# Patient Record
Sex: MALE | Race: WHITE | NOT HISPANIC OR LATINO | Employment: OTHER | URBAN - METROPOLITAN AREA
[De-identification: names, ages, dates, MRNs, and addresses within clinical notes are randomized per-mention and may not be internally consistent; named-entity substitution may affect disease eponyms.]

---

## 2019-08-13 ENCOUNTER — HOSPITAL ENCOUNTER (OUTPATIENT)
Dept: PREADMISSION TESTING | Facility: OTHER | Age: 64
Discharge: HOME | End: 2019-08-13
Attending: ORTHOPAEDIC SURGERY
Payer: MEDICARE

## 2019-08-13 ENCOUNTER — ANESTHESIA EVENT (OUTPATIENT)
Dept: SURGERY | Facility: OTHER | Age: 64
End: 2019-08-13
Payer: MEDICARE

## 2019-08-13 VITALS
HEIGHT: 69 IN | HEART RATE: 63 BPM | BODY MASS INDEX: 37.62 KG/M2 | DIASTOLIC BLOOD PRESSURE: 69 MMHG | WEIGHT: 254 LBS | OXYGEN SATURATION: 97 % | SYSTOLIC BLOOD PRESSURE: 137 MMHG | TEMPERATURE: 98 F

## 2019-08-13 DIAGNOSIS — Z01.818 PRE-OP TESTING: Primary | ICD-10-CM

## 2019-08-13 LAB
BASOPHILS # BLD AUTO: 0.06 K/UL (ref 0–0.2)
BASOPHILS NFR BLD: 0.6 % (ref 0–1.9)
DIFFERENTIAL METHOD: ABNORMAL
EOSINOPHIL # BLD AUTO: 0.5 K/UL (ref 0–0.5)
EOSINOPHIL NFR BLD: 4.8 % (ref 0–8)
ERYTHROCYTE [DISTWIDTH] IN BLOOD BY AUTOMATED COUNT: 14.2 % (ref 11.5–14.5)
HCT VFR BLD AUTO: 44.4 % (ref 40–54)
HGB BLD-MCNC: 13.7 G/DL (ref 14–18)
IMM GRANULOCYTES # BLD AUTO: 0.05 K/UL (ref 0–0.04)
IMM GRANULOCYTES NFR BLD AUTO: 0.5 % (ref 0–0.5)
LYMPHOCYTES # BLD AUTO: 2.3 K/UL (ref 1–4.8)
LYMPHOCYTES NFR BLD: 24.2 % (ref 18–48)
MCH RBC QN AUTO: 25.7 PG (ref 27–31)
MCHC RBC AUTO-ENTMCNC: 30.9 G/DL (ref 32–36)
MCV RBC AUTO: 83 FL (ref 82–98)
MONOCYTES # BLD AUTO: 1.3 K/UL (ref 0.3–1)
MONOCYTES NFR BLD: 13.1 % (ref 4–15)
NEUTROPHILS # BLD AUTO: 5.5 K/UL (ref 1.8–7.7)
NEUTROPHILS NFR BLD: 56.8 % (ref 38–73)
NRBC BLD-RTO: 0 /100 WBC
PLATELET # BLD AUTO: 260 K/UL (ref 150–350)
PMV BLD AUTO: 9.2 FL (ref 9.2–12.9)
RBC # BLD AUTO: 5.33 M/UL (ref 4.6–6.2)
WBC # BLD AUTO: 9.62 K/UL (ref 3.9–12.7)

## 2019-08-13 PROCEDURE — 36415 COLL VENOUS BLD VENIPUNCTURE: CPT

## 2019-08-13 PROCEDURE — 85025 COMPLETE CBC W/AUTO DIFF WBC: CPT

## 2019-08-13 RX ORDER — METOPROLOL SUCCINATE 50 MG/1
50 TABLET, EXTENDED RELEASE ORAL DAILY
COMMUNITY

## 2019-08-13 RX ORDER — FAMOTIDINE 20 MG/1
20 TABLET, FILM COATED ORAL
Status: CANCELLED | OUTPATIENT
Start: 2019-08-13 | End: 2019-08-13

## 2019-08-13 RX ORDER — OLMESARTAN MEDOXOMIL AND HYDROCHLOROTHIAZIDE 20/12.5 20; 12.5 MG/1; MG/1
1 TABLET ORAL DAILY
COMMUNITY

## 2019-08-13 RX ORDER — METFORMIN HYDROCHLORIDE 500 MG/1
500 TABLET ORAL 2 TIMES DAILY WITH MEALS
COMMUNITY
End: 2020-08-06

## 2019-08-13 RX ORDER — ACETAMINOPHEN 500 MG
1000 TABLET ORAL
Status: CANCELLED | OUTPATIENT
Start: 2019-08-13 | End: 2019-08-13

## 2019-08-13 RX ORDER — UBIDECARENONE 30 MG
30 CAPSULE ORAL DAILY
COMMUNITY
End: 2020-08-06

## 2019-08-13 RX ORDER — SODIUM CHLORIDE, SODIUM LACTATE, POTASSIUM CHLORIDE, CALCIUM CHLORIDE 600; 310; 30; 20 MG/100ML; MG/100ML; MG/100ML; MG/100ML
INJECTION, SOLUTION INTRAVENOUS CONTINUOUS
Status: CANCELLED | OUTPATIENT
Start: 2019-08-13

## 2019-08-13 RX ORDER — LEVOTHYROXINE SODIUM 200 UG/1
200 TABLET ORAL DAILY
Status: ON HOLD | COMMUNITY
End: 2020-06-08 | Stop reason: SDUPTHER

## 2019-08-13 RX ORDER — PREGABALIN 75 MG/1
75 CAPSULE ORAL
Status: CANCELLED | OUTPATIENT
Start: 2019-08-13 | End: 2019-08-13

## 2019-08-13 RX ORDER — AMLODIPINE BESYLATE 10 MG/1
10 TABLET ORAL DAILY
COMMUNITY

## 2019-08-13 NOTE — DISCHARGE INSTRUCTIONS
PRE-ADMIT TESTING -  [phone]    [address]  [facility]          Your surgery has been scheduled at [facility]. We are pleased to have the opportunity to serve you. For Further Information please call [phone].    On the day of surgery please report to the Information Desk on the 1st floor.    · CONTACT YOUR PHYSICIAN'S OFFICE THE DAY PRIOR TO YOUR SURGERY TO OBTAIN YOUR ARRIVAL TIME.     · The evening before surgery do not eat anything after 9 p.m. ( this includes hard candy, chewing gum and mints).  You may only have GATORADE, POWERADE AND WATER  from 9 p.m. until you leave your home.   DO NOT DRINK ANY LIQUIDS ON THE WAY TO THE HOSPITAL.      SPECIAL MEDICATION INSTRUCTIONS: TAKE medications checked off by the Anesthesiologist on your Medication List.    Angiogram Patients: Take medications as instructed by your physician, including aspirin.     Surgery Patients:    If you take ASPIRIN - Your PHYSICIAN/SURGEON will need to inform you IF/OR when you need to stop taking aspirin prior to your surgery.     Do Not take any medications containing IBUPROFEN.  Do Not Wear any make-up or dark nail polish   (especially eye make-up) to surgery. If you come to surgery with makeup on you will be required to remove the makeup or nail polish.    Do not shave your surgical area at least 5 days prior to your surgery. The surgical prep will be performed at the hospital according to Infection Control regulations.    Leave all valuables at home.   Do Not wear any jewelry or watches, including any metal in body piercings. Jewelry must be removed prior to coming to the hospital.  There is a possibility that rings that are unable to be removed may be cut off if they are on the surgical extremity.    Contact Lens must be removed before surgery. Either do not wear the contact lens or bring a case and solution for storage.  Please bring a container for eyeglasses or dentures as required.  Bring  any paperwork your physician has provided, such as consent forms,  history and physicals, doctor's orders, etc.   Bring comfortable clothes that are loose fitting to wear upon discharge. Take into consideration the type of surgery being performed.  Maintain your diet as advised per your physician the day prior to surgery.      Adequate rest the night before surgery is advised.   Park in the Parking lot behind the hospital or in the [facility] across the street from the parking lot. Parking is complimentary.  If you will be discharged the same day as your procedure, please arrange for a responsible adult to drive you home or to accompany you if traveling by taxi.   YOU WILL NOT BE PERMITTED TO DRIVE OR TO LEAVE THE HOSPITAL ALONE AFTER SURGERY.   It is strongly recommended that you arrange for someone to remain with you for the first 24 hrs following your surgery.    The Surgeon will speak to your family/visitor after your surgery regarding the outcome of your surgery and post op care.  The Surgeon may speak to you after your surgery, but there is a possibility you may not remember the details.  Please check with your family members regarding the conversation with the Surgeon.    We strongly recommend whoever is bringing you home be present for discharge instructions.  This will ensure a thorough understanding for your post op home care.      Thank you for your cooperation.  The Staff of [facility].                Bathing Instructions with Hibiclens     Shower the evening before and morning of your procedure with Hibiclens:   Wash your face with water and your regular face wash/soap   Apply Hibiclens directly on your skin or on a wet washcloth and wash gently. When showering: Move away from the shower stream when applying Hibiclens to avoid rinsing off too soon.   Rinse thoroughly with warm water   Do not dilute Hibiclens         Dry off as usual, do not use any deodorant,  powder, body lotions, perfume, after shave or cologne.

## 2019-08-13 NOTE — ANESTHESIA PREPROCEDURE EVALUATION
08/13/2019  Lorena Rodriguez is a 64 y.o., male.    Anesthesia Evaluation    I have reviewed the Patient Summary Reports.    I have reviewed the Nursing Notes.   I have reviewed the Medications.     Review of Systems  Anesthesia Hx:  No problems with previous Anesthesia  Denies Family Hx of Anesthesia complications.   Denies Personal Hx of Anesthesia complications.   Social:  Non-Smoker    Hematology/Oncology:  Hematology Normal   Oncology Normal     Cardiovascular:   Hypertension CAD  CABG/stent  Coronary stent [place] 2012.  Saw cardiologist preoperatively   Pulmonary:  Pulmonary Normal    Renal/:  Renal/ Normal     Hepatic/GI:   PUD, GERD    Musculoskeletal:  Musculoskeletal Normal    Neurological:  Neurology Normal    Endocrine:   Diabetes, well controlled, type 2 Hypothyroidism        Physical Exam  General:  Well nourished    Airway/Jaw/Neck:  Airway Findings: Mouth Opening: Normal Tongue: Normal  General Airway Assessment: Adult  Mallampati: I  TM Distance: Normal, at least 6 cm         Dental:  Dental Findings: Upper Dentures, Lower Dentures             Anesthesia Plan  Type of Anesthesia, risks & benefits discussed:  Anesthesia Type:  general  Patient's Preference:   Intra-op Monitoring Plan: standard ASA monitors  Intra-op Monitoring Plan Comments:   Post Op Pain Control Plan: peripheral nerve block and multimodal analgesia  Post Op Pain Control Plan Comments:   Induction:   IV  Beta Blocker:         Informed Consent: Patient understands risks and agrees with Anesthesia plan.  Questions answered. Anesthesia consent signed with patient.  ASA Score: 3     Day of Surgery Review of History & Physical:    H&P update referred to the surgeon.     Anesthesia Plan Notes: Cardiac studies in care everywhere        Ready For Surgery From Anesthesia Perspective.

## 2019-08-20 ENCOUNTER — ANESTHESIA (OUTPATIENT)
Dept: SURGERY | Facility: OTHER | Age: 64
End: 2019-08-20
Payer: MEDICARE

## 2019-08-20 ENCOUNTER — HOSPITAL ENCOUNTER (OUTPATIENT)
Facility: OTHER | Age: 64
Discharge: HOME | End: 2019-08-20
Attending: ORTHOPAEDIC SURGERY | Admitting: ORTHOPAEDIC SURGERY
Payer: MEDICARE

## 2019-08-20 VITALS
HEIGHT: 69 IN | WEIGHT: 254 LBS | BODY MASS INDEX: 37.62 KG/M2 | RESPIRATION RATE: 16 BRPM | TEMPERATURE: 99 F | HEART RATE: 66 BPM | OXYGEN SATURATION: 97 % | SYSTOLIC BLOOD PRESSURE: 113 MMHG | DIASTOLIC BLOOD PRESSURE: 86 MMHG

## 2019-08-20 DIAGNOSIS — M75.121 COMPLETE ROTATOR CUFF TEAR OR RUPTURE OF RIGHT SHOULDER, NOT SPECIFIED AS TRAUMATIC: Primary | ICD-10-CM

## 2019-08-20 PROBLEM — M75.41 IMPINGEMENT SYNDROME OF SHOULDER, RIGHT: Status: ACTIVE | Noted: 2019-08-20

## 2019-08-20 PROCEDURE — 71000015 HC POSTOP RECOV 1ST HR: Performed by: ORTHOPAEDIC SURGERY

## 2019-08-20 PROCEDURE — 63600175 PHARM REV CODE 636 W HCPCS: Performed by: ANESTHESIOLOGY

## 2019-08-20 PROCEDURE — 37000009 HC ANESTHESIA EA ADD 15 MINS: Performed by: ORTHOPAEDIC SURGERY

## 2019-08-20 PROCEDURE — 25000003 PHARM REV CODE 250: Performed by: NURSE ANESTHETIST, CERTIFIED REGISTERED

## 2019-08-20 PROCEDURE — S0077 INJECTION, CLINDAMYCIN PHOSP: HCPCS | Performed by: ORTHOPAEDIC SURGERY

## 2019-08-20 PROCEDURE — C1713 ANCHOR/SCREW BN/BN,TIS/BN: HCPCS | Performed by: ORTHOPAEDIC SURGERY

## 2019-08-20 PROCEDURE — 63600175 PHARM REV CODE 636 W HCPCS: Performed by: ORTHOPAEDIC SURGERY

## 2019-08-20 PROCEDURE — 82962 GLUCOSE BLOOD TEST: CPT | Performed by: ORTHOPAEDIC SURGERY

## 2019-08-20 PROCEDURE — 71000033 HC RECOVERY, INTIAL HOUR: Performed by: ORTHOPAEDIC SURGERY

## 2019-08-20 PROCEDURE — C1889 IMPLANT/INSERT DEVICE, NOC: HCPCS | Performed by: ORTHOPAEDIC SURGERY

## 2019-08-20 PROCEDURE — 36000711: Performed by: ORTHOPAEDIC SURGERY

## 2019-08-20 PROCEDURE — 36000710: Performed by: ORTHOPAEDIC SURGERY

## 2019-08-20 PROCEDURE — 63600175 PHARM REV CODE 636 W HCPCS: Performed by: NURSE ANESTHETIST, CERTIFIED REGISTERED

## 2019-08-20 PROCEDURE — 27201423 OPTIME MED/SURG SUP & DEVICES STERILE SUPPLY: Performed by: ORTHOPAEDIC SURGERY

## 2019-08-20 PROCEDURE — 71000016 HC POSTOP RECOV ADDL HR: Performed by: ORTHOPAEDIC SURGERY

## 2019-08-20 PROCEDURE — 71000039 HC RECOVERY, EACH ADD'L HOUR: Performed by: ORTHOPAEDIC SURGERY

## 2019-08-20 PROCEDURE — 25000003 PHARM REV CODE 250: Performed by: ORTHOPAEDIC SURGERY

## 2019-08-20 PROCEDURE — 25000003 PHARM REV CODE 250: Performed by: ANESTHESIOLOGY

## 2019-08-20 PROCEDURE — 37000008 HC ANESTHESIA 1ST 15 MINUTES: Performed by: ORTHOPAEDIC SURGERY

## 2019-08-20 DEVICE — ANCHOR TENDON 8: Type: IMPLANTABLE DEVICE | Site: SHOULDER | Status: FUNCTIONAL

## 2019-08-20 DEVICE — ANCHOR BONE ARTHSCP DEL SYS: Type: IMPLANTABLE DEVICE | Site: SHOULDER | Status: FUNCTIONAL

## 2019-08-20 DEVICE — ANCHOR SWIVELOCK C BLU FIBERTP: Type: IMPLANTABLE DEVICE | Site: SHOULDER | Status: FUNCTIONAL

## 2019-08-20 DEVICE — ANCHOR BIOCOMP SWVLLOK: Type: IMPLANTABLE DEVICE | Site: SHOULDER | Status: FUNCTIONAL

## 2019-08-20 DEVICE — ANCHOR BIOCOMP W/3 SUTURES: Type: IMPLANTABLE DEVICE | Site: SHOULDER | Status: FUNCTIONAL

## 2019-08-20 DEVICE — IMPLANT ARTHSCP BIOINDUCTV LG: Type: IMPLANTABLE DEVICE | Site: SHOULDER | Status: FUNCTIONAL

## 2019-08-20 RX ORDER — ACETAMINOPHEN 500 MG
1000 TABLET ORAL
Status: COMPLETED | OUTPATIENT
Start: 2019-08-20 | End: 2019-08-20

## 2019-08-20 RX ORDER — FAMOTIDINE 20 MG/1
20 TABLET, FILM COATED ORAL
Status: COMPLETED | OUTPATIENT
Start: 2019-08-20 | End: 2019-08-20

## 2019-08-20 RX ORDER — CLINDAMYCIN PHOSPHATE 900 MG/50ML
900 INJECTION, SOLUTION INTRAVENOUS
Status: COMPLETED | OUTPATIENT
Start: 2019-08-20 | End: 2019-08-20

## 2019-08-20 RX ORDER — DEXAMETHASONE SODIUM PHOSPHATE 4 MG/ML
INJECTION, SOLUTION INTRA-ARTICULAR; INTRALESIONAL; INTRAMUSCULAR; INTRAVENOUS; SOFT TISSUE
Status: DISCONTINUED | OUTPATIENT
Start: 2019-08-20 | End: 2019-08-20

## 2019-08-20 RX ORDER — EPINEPHRINE 1 MG/ML
INJECTION, SOLUTION INTRACARDIAC; INTRAMUSCULAR; INTRAVENOUS; SUBCUTANEOUS
Status: DISCONTINUED | OUTPATIENT
Start: 2019-08-20 | End: 2019-08-20 | Stop reason: HOSPADM

## 2019-08-20 RX ORDER — PREGABALIN 75 MG/1
75 CAPSULE ORAL
Status: COMPLETED | OUTPATIENT
Start: 2019-08-20 | End: 2019-08-20

## 2019-08-20 RX ORDER — OXYCODONE HYDROCHLORIDE 5 MG/1
10 TABLET ORAL EVERY 4 HOURS PRN
Status: DISCONTINUED | OUTPATIENT
Start: 2019-08-20 | End: 2019-08-20 | Stop reason: HOSPADM

## 2019-08-20 RX ORDER — PROPOFOL 10 MG/ML
VIAL (ML) INTRAVENOUS
Status: DISCONTINUED | OUTPATIENT
Start: 2019-08-20 | End: 2019-08-20

## 2019-08-20 RX ORDER — ONDANSETRON 2 MG/ML
INJECTION INTRAMUSCULAR; INTRAVENOUS
Status: DISCONTINUED | OUTPATIENT
Start: 2019-08-20 | End: 2019-08-20

## 2019-08-20 RX ORDER — ROPIVACAINE HYDROCHLORIDE 5 MG/ML
INJECTION, SOLUTION EPIDURAL; INFILTRATION; PERINEURAL
Status: COMPLETED | OUTPATIENT
Start: 2019-08-20 | End: 2019-08-20

## 2019-08-20 RX ORDER — SODIUM CHLORIDE 0.9 % (FLUSH) 0.9 %
3 SYRINGE (ML) INJECTION
Status: DISCONTINUED | OUTPATIENT
Start: 2019-08-20 | End: 2019-08-20 | Stop reason: HOSPADM

## 2019-08-20 RX ORDER — OXYCODONE AND ACETAMINOPHEN 10; 325 MG/1; MG/1
1 TABLET ORAL EVERY 4 HOURS PRN
Qty: 35 TABLET | Refills: 0 | Status: SHIPPED | OUTPATIENT
Start: 2019-08-20 | End: 2020-08-03 | Stop reason: ALTCHOICE

## 2019-08-20 RX ORDER — MIDAZOLAM HYDROCHLORIDE 1 MG/ML
2 INJECTION INTRAMUSCULAR; INTRAVENOUS
Status: DISCONTINUED | OUTPATIENT
Start: 2019-08-20 | End: 2019-08-20

## 2019-08-20 RX ORDER — FENTANYL CITRATE 50 UG/ML
INJECTION, SOLUTION INTRAMUSCULAR; INTRAVENOUS
Status: DISCONTINUED | OUTPATIENT
Start: 2019-08-20 | End: 2019-08-20

## 2019-08-20 RX ORDER — GLYCOPYRROLATE 0.2 MG/ML
INJECTION INTRAMUSCULAR; INTRAVENOUS
Status: DISCONTINUED | OUTPATIENT
Start: 2019-08-20 | End: 2019-08-20

## 2019-08-20 RX ORDER — ONDANSETRON 4 MG/1
8 TABLET, ORALLY DISINTEGRATING ORAL EVERY 8 HOURS PRN
Qty: 10 TABLET | Refills: 1 | Status: SHIPPED | OUTPATIENT
Start: 2019-08-20 | End: 2020-08-03 | Stop reason: ALTCHOICE

## 2019-08-20 RX ORDER — ONDANSETRON 2 MG/ML
4 INJECTION INTRAMUSCULAR; INTRAVENOUS EVERY 12 HOURS PRN
Status: DISCONTINUED | OUTPATIENT
Start: 2019-08-20 | End: 2019-08-20 | Stop reason: HOSPADM

## 2019-08-20 RX ORDER — LIDOCAINE HCL/PF 100 MG/5ML
SYRINGE (ML) INTRAVENOUS
Status: DISCONTINUED | OUTPATIENT
Start: 2019-08-20 | End: 2019-08-20

## 2019-08-20 RX ORDER — SODIUM CHLORIDE 0.9 % (FLUSH) 0.9 %
10 SYRINGE (ML) INJECTION
Status: DISCONTINUED | OUTPATIENT
Start: 2019-08-20 | End: 2019-08-20 | Stop reason: HOSPADM

## 2019-08-20 RX ORDER — HYDROCODONE BITARTRATE AND ACETAMINOPHEN 5; 325 MG/1; MG/1
1 TABLET ORAL EVERY 4 HOURS PRN
Status: DISCONTINUED | OUTPATIENT
Start: 2019-08-20 | End: 2019-08-20 | Stop reason: HOSPADM

## 2019-08-20 RX ORDER — MEPERIDINE HYDROCHLORIDE 25 MG/ML
12.5 INJECTION INTRAMUSCULAR; INTRAVENOUS; SUBCUTANEOUS ONCE AS NEEDED
Status: DISCONTINUED | OUTPATIENT
Start: 2019-08-20 | End: 2019-08-20 | Stop reason: HOSPADM

## 2019-08-20 RX ORDER — NEOSTIGMINE METHYLSULFATE 1 MG/ML
INJECTION, SOLUTION INTRAVENOUS
Status: DISCONTINUED | OUTPATIENT
Start: 2019-08-20 | End: 2019-08-20

## 2019-08-20 RX ORDER — HYDROMORPHONE HYDROCHLORIDE 2 MG/ML
0.4 INJECTION, SOLUTION INTRAMUSCULAR; INTRAVENOUS; SUBCUTANEOUS EVERY 5 MIN PRN
Status: DISCONTINUED | OUTPATIENT
Start: 2019-08-20 | End: 2019-08-20 | Stop reason: HOSPADM

## 2019-08-20 RX ORDER — TRAMADOL HYDROCHLORIDE 50 MG/1
50 TABLET ORAL EVERY 4 HOURS PRN
Status: DISCONTINUED | OUTPATIENT
Start: 2019-08-20 | End: 2019-08-20 | Stop reason: HOSPADM

## 2019-08-20 RX ORDER — OXYCODONE HYDROCHLORIDE 5 MG/1
5 TABLET ORAL
Status: DISCONTINUED | OUTPATIENT
Start: 2019-08-20 | End: 2019-08-20 | Stop reason: HOSPADM

## 2019-08-20 RX ORDER — ONDANSETRON 2 MG/ML
4 INJECTION INTRAMUSCULAR; INTRAVENOUS DAILY PRN
Status: DISCONTINUED | OUTPATIENT
Start: 2019-08-20 | End: 2019-08-20 | Stop reason: HOSPADM

## 2019-08-20 RX ORDER — MUPIROCIN 20 MG/G
1 OINTMENT TOPICAL 2 TIMES DAILY
Status: DISCONTINUED | OUTPATIENT
Start: 2019-08-20 | End: 2019-08-20 | Stop reason: HOSPADM

## 2019-08-20 RX ORDER — SODIUM CHLORIDE, SODIUM LACTATE, POTASSIUM CHLORIDE, CALCIUM CHLORIDE 600; 310; 30; 20 MG/100ML; MG/100ML; MG/100ML; MG/100ML
INJECTION, SOLUTION INTRAVENOUS CONTINUOUS
Status: DISCONTINUED | OUTPATIENT
Start: 2019-08-20 | End: 2019-08-20 | Stop reason: HOSPADM

## 2019-08-20 RX ORDER — FENTANYL CITRATE 50 UG/ML
100 INJECTION, SOLUTION INTRAMUSCULAR; INTRAVENOUS EVERY 5 MIN PRN
Status: DISCONTINUED | OUTPATIENT
Start: 2019-08-20 | End: 2019-08-20

## 2019-08-20 RX ORDER — MIDAZOLAM HYDROCHLORIDE 1 MG/ML
2 INJECTION INTRAMUSCULAR; INTRAVENOUS
Status: DISCONTINUED | OUTPATIENT
Start: 2019-08-20 | End: 2019-08-20 | Stop reason: HOSPADM

## 2019-08-20 RX ORDER — FENTANYL CITRATE 50 UG/ML
100 INJECTION, SOLUTION INTRAMUSCULAR; INTRAVENOUS EVERY 5 MIN PRN
Status: DISCONTINUED | OUTPATIENT
Start: 2019-08-20 | End: 2019-08-20 | Stop reason: HOSPADM

## 2019-08-20 RX ORDER — ROCURONIUM BROMIDE 10 MG/ML
INJECTION, SOLUTION INTRAVENOUS
Status: DISCONTINUED | OUTPATIENT
Start: 2019-08-20 | End: 2019-08-20

## 2019-08-20 RX ADMIN — ROPIVACAINE HYDROCHLORIDE 30 ML: 5 INJECTION, SOLUTION EPIDURAL; INFILTRATION; PERINEURAL at 10:08

## 2019-08-20 RX ADMIN — ACETAMINOPHEN 1000 MG: 500 TABLET, FILM COATED ORAL at 08:08

## 2019-08-20 RX ADMIN — PREGABALIN 75 MG: 75 CAPSULE ORAL at 08:08

## 2019-08-20 RX ADMIN — PROPOFOL 200 MG: 10 INJECTION, EMULSION INTRAVENOUS at 10:08

## 2019-08-20 RX ADMIN — CARBOXYMETHYLCELLULOSE SODIUM 3 DROP: 2.5 SOLUTION/ DROPS OPHTHALMIC at 10:08

## 2019-08-20 RX ADMIN — SODIUM CHLORIDE, SODIUM LACTATE, POTASSIUM CHLORIDE, AND CALCIUM CHLORIDE: 600; 310; 30; 20 INJECTION, SOLUTION INTRAVENOUS at 09:08

## 2019-08-20 RX ADMIN — CLINDAMYCIN PHOSPHATE 900 MG: 18 INJECTION, SOLUTION INTRAVENOUS at 10:08

## 2019-08-20 RX ADMIN — FAMOTIDINE 20 MG: 20 TABLET, FILM COATED ORAL at 08:08

## 2019-08-20 RX ADMIN — DEXAMETHASONE SODIUM PHOSPHATE 4 MG: 4 INJECTION, SOLUTION INTRAMUSCULAR; INTRAVENOUS at 10:08

## 2019-08-20 RX ADMIN — GLYCOPYRROLATE 0.7 MG: 0.2 INJECTION, SOLUTION INTRAMUSCULAR; INTRAVENOUS at 11:08

## 2019-08-20 RX ADMIN — ONDANSETRON 4 MG: 2 INJECTION INTRAMUSCULAR; INTRAVENOUS at 11:08

## 2019-08-20 RX ADMIN — ROCURONIUM BROMIDE 50 MG: 10 INJECTION, SOLUTION INTRAVENOUS at 10:08

## 2019-08-20 RX ADMIN — NEOSTIGMINE METHYLSULFATE 5 MG: 1 INJECTION INTRAVENOUS at 11:08

## 2019-08-20 RX ADMIN — LIDOCAINE HYDROCHLORIDE 100 MG: 20 INJECTION, SOLUTION INTRAVENOUS at 10:08

## 2019-08-20 NOTE — ANESTHESIA PROCEDURE NOTES
Right interscalene block    Patient location during procedure: holding area   Block not for primary anesthetic.  Reason for block: at surgeon's request and post-op pain management   Post-op Pain Location: right shoulder pain  Start time: 8/20/2019 9:53 AM  Timeout: 8/20/2019 9:52 AM   End time: 8/20/2019 10:03 AM    Staffing  Authorizing Provider: Clay Parker MD  Performing Provider: Clay Parker MD    Preanesthetic Checklist  Completed: patient identified, site marked, surgical consent, pre-op evaluation, timeout performed, IV checked, risks and benefits discussed and monitors and equipment checked  Peripheral Block  Patient position: supine  Prep: ChloraPrep and site prepped and draped  Patient monitoring: heart rate, cardiac monitor, continuous pulse ox and frequent blood pressure checks  Block type: interscalene  Laterality: right  Injection technique: single shot  Needle  Needle type: Echogenic   Needle gauge: 22 G  Needle length: 4 in  Needle localization: anatomical landmarks, nerve stimulator and ultrasound guidance   -ultrasound image captured on disc.  Assessment  Injection assessment: negative aspiration, negative parasthesia and local visualized surrounding nerve  Paresthesia pain: none  Heart rate change: no  Slow fractionated injection: yes

## 2019-08-20 NOTE — TRANSFER OF CARE
"Anesthesia Transfer of Care Note    Patient: Lorena Rodriguez    Procedure(s) Performed: Procedure(s) (LRB):  ARTHROSCOPY, SHOULDER, WITH SUBACROMIAL SPACE DECOMPRESSION (Right)  REPAIR, ROTATOR CUFF, ARTHROSCOPIC (Right)  CHONDROPLASTY, SHOULDER (Right)  CAPSULORRHAPHY (Right)  ARTHROSCOPY, SHOULDER (Right)    Patient location: PACU    Anesthesia Type: general    Transport from OR: Transported from OR on 2-3 L/min O2 by NC with adequate spontaneous ventilation    Post pain: adequate analgesia    Post assessment: no apparent anesthetic complications    Post vital signs: stable    Level of consciousness: awake and alert    Nausea/Vomiting: no nausea/vomiting    Complications: none    Transfer of care protocol was followed      Last vitals:   Visit Vitals  BP (!) 140/70 (BP Location: Right arm, Patient Position: Sitting)   Pulse 65   Temp 36.9 °C (98.4 °F) (Oral)   Resp 16   Ht 5' 8.5" (1.74 m)   Wt 115.2 kg (254 lb)   SpO2 95%   BMI 38.06 kg/m²     "

## 2019-08-20 NOTE — ANESTHESIA POSTPROCEDURE EVALUATION
Anesthesia Post Evaluation    Patient: Lorena Rodriguez    Procedure(s) Performed: Procedure(s) (LRB):  ARTHROSCOPY, SHOULDER, WITH SUBACROMIAL SPACE DECOMPRESSION (Right)  REPAIR, ROTATOR CUFF, ARTHROSCOPIC (Right)  CHONDROPLASTY, SHOULDER (Right)  CAPSULORRHAPHY (Right)    Final Anesthesia Type: general  Patient location during evaluation: PACU  Patient participation: Yes- Able to Participate  Level of consciousness: oriented and awake  Post-procedure vital signs: reviewed and stable  Pain management: adequate  Airway patency: patent  PONV status at discharge: No PONV  Anesthetic complications: no      Cardiovascular status: hemodynamically stable  Respiratory status: unassisted, spontaneous ventilation and room air  Hydration status: euvolemic  Follow-up not needed.          Vitals Value Taken Time   /58 8/20/2019  1:13 PM   Temp 36.9 °C (98.5 °F) 8/20/2019  1:13 PM   Pulse 66 8/20/2019  1:13 PM   Resp 16 8/20/2019  1:13 PM   SpO2 94 % 8/20/2019  1:13 PM         Event Time     Out of Recovery 13:10:53          Pain/Duke Score: Pain Rating Prior to Med Admin: 0 (8/20/2019  8:29 AM)  Duke Score: 9 (8/20/2019  1:13 PM)

## 2019-08-20 NOTE — DISCHARGE INSTRUCTIONS
Select on Hyperlink below to print updated instructions from [email or web address]  [email or web address]      Select on Hyperlink below to print updated instructions from [email or web address]  Shoulder Sling Shot 2 Breg Brace              FOLLOW ANY OTHER INSTRUCTIONS GIVEN TO YOU BY DR ALFARO

## 2019-08-20 NOTE — OP NOTE
[facility]  Surgery Department  Operative Note    SUMMARY     Date of Procedure: 8/20/2019     Procedure:   1.  Right shoulder arthroscopic rotator cuff repair  2.  Right shoulder allograft patch augmentation (Erwin and Nephew Regeneten)  3.  Right shoulder arthroscopic subacromial decompression (bursectomy, CA ligament resession, bursectomy)  4.  Right shoulder arthroscopic biceps tenotomy    Surgeon(s) and Role:     * Nj You MD - Primary    Assistant: Sheron Franklin CST    Pre-Operative Diagnosis:   1.  Right shoulder rotator cuff tear  2.  Right shoulder impingement syndrome  3.  Right shoulder long head biceps tear  4.  Right shoulder glenohumeral joint chondromalacia    Post-Operative Diagnosis:   Same    Anesthesia: General + regional    Technical Procedures Used: Mr. Rodriguez was taken to the operating room on 08/20/2019 for planned right shoulder arthroscopy.  He was given 900 mg of clindamycin as well as an interscalene block by the anesthesia service.  He is brought to the operating room and placed in the supine position.  General tracheal anesthesia was administered.  Examination under anesthesia revealed full passive motion with no pathologic laxity.  His right shoulder was then placed in a shoulder suspension device while he was in the lateral decubitus position with all bony prominences padded appropriately with an axillary roll and beanbag.  The right shoulder was then prepped and draped in the usual sterile fashion. Surgical marking pen was then used to marcin the bony landmarks and 30 cc of normal saline were injected into the glenohumeral joint to aid in capsular distention.  A standard posterior portal was then established.    Diagnostic arthroscopy then proceeded.  There was evidence of a Gautam complex with some mild undersurface fraying of the subscapularis but no high-grade tearing.  There was diffuse grade 1/2 chondromalacia of the humeral head and glenoid.  There was  circumferential labral fraying but no unstable labral tearing.  There is an obvious full-thickness supraspinatus and infraspinatus tear visualized from the articular side.  There was a very high-grade long head biceps tear with a few strands remaining.  Through an anterior portal established with spinal needle localization the biceps tendon was tenotomized and seen to incarcerated self within the groove.  The electric cart of ice was then used to trim back some of the degenerative labral fraying.  The scope was then placed in the subacromial space. A lateral portal was established with spinal needle localization.  A thorough bursectomy was performed of some thickened chronic appearing bursal tissue.  A type 3 acromion was encountered with a hypertrophied CA ligament. The CA ligament was then recessed off the acromial edge and an acromioplasty was performed from a type 3 to a type 1 morphology.  The shaver was then brought in to remove any soft tissue off the articular margin/insertion of the rotator cuff.  An accessory lateral portal was then established followed by a 5.5 mm triple loaded corkscrew anchor.  A side-to-side suture was then used to close the reverse L type tear to more of a crescent type tear.  All 6 sutures were then passed in a horizontal mattress fashion starting anterior and working posterior.  Sliding knots were then tied reapproximating the tendon nicely back to its insertion site. A lateral row construct was utilized with 24.75 mm SwiveLock anchors.  The final repair construct was probed and found to be rock solid stable. Given the size of the tear a large Smith and Nephew Regeneten patch was used covering the thinned portion of the tendon. The shaver was then brought in to remove any soft tissue or bony debris. The portals were then closed with 3 0 Prolene suture a soft dressing was applied followed by a polar Care unit and an abduction brace.  The patient was then extubated in the operating  room and taken to the PACU without complication.    Description of the Findings of the Procedure: see above    Significant Surgical Tasks Conducted by the Assistant(s), if Applicable:  Retraction, instrumentation, draping    Complications: No    Estimated Blood Loss (EBL): 3cc         Implants:   Implant Name Type Inv. Item Serial No.  Lot No. LRB No. Used   ANCHOR BIOCOMP W/3 SUTURES - [id number]  ANCHOR BIOCOMP W/3 SUTURES  [employer] [id number] Right 1   ANCHOR SWIVELOCK C CATALINO FIBERTP - [id number]  ANCHOR SWIVELOCK C CATALINO FIBERTP  [employer] [id number] Right 1   ANCHOR BIOCOMP SWVLLOK - [id number]  ANCHOR BIOCOMP SWVLLOK  [employer] [id number] Right 1   ANCHOR BONE ARTHSCP DEL SYS - [id number]  ANCHOR BONE ARTHSCP DEL SYS  [employer]  Right 1   ANCHOR TENDON 8 - [id number]  ANCHOR TENDON 8  [employer] [id number] Right 1   IMPLANT ARTHSCP BIOINDUCTV LG - [id number]  IMPLANT ARTHSCP BIOINDUCTV LG  [facility] [employer] [id number] Right 1   ANCHOR TENDON 8 - [id number]  ANCHOR TENDON 8  [employer] [id number] Right 1       Specimens:   Specimen (12h ago, onward)    None                  Condition: Good    Disposition: PACU - hemodynamically stable.    Attestation: I was present and scrubbed for the entire procedure.    Discharge Note    SUMMARY     Admit Date: 8/20/2019    Discharge Date and Time: No discharge date for patient encounter.    Hospital Course (synopsis of major diagnoses, care, treatment, and services provided during the course of the hospital stay): outpt     Final Diagnosis: Post-Op Diagnosis Codes:     * Complete rotator cuff tear or rupture of right shoulder, not specified as traumatic [M75.121]    Disposition: Home or Self Care    Follow Up/Patient Instructions:     Medications:    Reconciled Home Medications:      Medication List      START taking these medications    ondansetron 4 MG Tbdl  Commonly known as:  ZOFRAN-ODT  Take 2 tablets (8 mg total) by mouth every 8 (eight)  hours as needed.     oxyCODONE-acetaminophen  mg per tablet  Commonly known as:  PERCOCET  Take 1 tablet by mouth every 4 (four) hours as needed.        CONTINUE taking these medications    amLODIPine 10 MG tablet  Commonly known as:  NORVASC  Take 10 mg by mouth once daily.     co-enzyme Q-10 30 mg capsule  Take 30 mg by mouth once daily.     levothyroxine 200 MCG tablet  Commonly known as:  SYNTHROID  Take 200 mcg by mouth once daily.     metFORMIN 500 MG tablet  Commonly known as:  GLUCOPHAGE  Take 500 mg by mouth 2 (two) times daily with meals.     metoprolol succinate 50 MG 24 hr tablet  Commonly known as:  TOPROL-XL  Take 50 mg by mouth once daily.     NITROGLYCERIN SL  Place 0.4 mg under the tongue daily as needed.     olmesartan-hydrochlorothiazide 20-12.5 mg per tablet  Commonly known as:  BENICAR HCT  Take 1 tablet by mouth once daily.          Discharge Procedure Orders   Diet general     Call MD for:  temperature >100.4     Call MD for:  persistent nausea and vomiting     Call MD for:  severe uncontrolled pain     Call MD for:  difficulty breathing, headache or visual disturbances     Call MD for:  redness, tenderness, or signs of infection (pain, swelling, redness, odor or green/yellow discharge around incision site)     Call MD for:  hives     Call MD for:  persistent dizziness or light-headedness     Call MD for:  extreme fatigue     Ice to affected area     Keep surgical extremity elevated     Lifting restrictions     Remove dressing in 72 hours     Follow-up Information     Nj Pollack MD In 10 days.    Specialty:  Orthopedic Surgery  Why:  For suture removal, For wound re-check  Contact information:  [address] [place] [id number] [phone]

## 2019-08-20 NOTE — PLAN OF CARE
Lorena Rodriguez has met all discharge criteria from Phase II. Vital Signs are stable, ambulating  without difficulty. Discharge instructions given, patient verbalized understanding. Discharged from facility via wheelchair in stable condition.

## 2019-08-21 LAB — POCT GLUCOSE: 123 MG/DL (ref 70–110)

## 2019-08-30 ENCOUNTER — CLINICAL SUPPORT (OUTPATIENT)
Dept: REHABILITATION | Facility: HOSPITAL | Age: 64
End: 2019-08-30
Attending: ORTHOPAEDIC SURGERY
Payer: MEDICARE

## 2019-08-30 DIAGNOSIS — M25.611 DECREASED RIGHT SHOULDER RANGE OF MOTION: ICD-10-CM

## 2019-08-30 DIAGNOSIS — S49.91XA RIGHT SHOULDER INJURY: Primary | ICD-10-CM

## 2019-08-30 DIAGNOSIS — Z98.890 STATUS POST SHOULDER SURGERY: ICD-10-CM

## 2019-08-30 DIAGNOSIS — M25.511 ACUTE PAIN OF RIGHT SHOULDER: ICD-10-CM

## 2019-08-30 DIAGNOSIS — R29.898 WEAKNESS OF SHOULDER: ICD-10-CM

## 2019-08-30 PROCEDURE — 97161 PT EVAL LOW COMPLEX 20 MIN: CPT | Mod: PN

## 2019-08-30 PROCEDURE — 97110 THERAPEUTIC EXERCISES: CPT | Mod: PN

## 2019-08-30 NOTE — PROGRESS NOTES
[facility]  Physical Therapy Initial Evaluation    Name: Lorena Rodriguez  Clinic Number: [id number]    Therapy Diagnosis:   Encounter Diagnoses   Name Primary?    Status post shoulder surgery     Weakness of shoulder     Decreased right shoulder range of motion     Acute pain of right shoulder      Physician: Nj You MD    Physician Orders: PT Eval and Treat   Medical Diagnosis from Referral: Right shoulder injury  Evaluation Date: 8/30/2019  Authorization Period Expiration: 8/29/2020   Plan of Care Expiration: 12/31/2019  Visit # / Visits authorized:1/ 1    Time In: 3:00 pm  Time Out: 4:00 pm  Total Billable Time: 60 minutes    Precautions: s/p R RTC surgery Medium to large tears ( greater than 1 cm, less than 5 cm)     Subjective   Date of surgery: 8/20/2019  History of current condition - Lorena reports: that over the years his R shoulder has been hurting. Pt states he had second R shoulder surgery on 8/20/2019. Pt states he had first  shoulder surgery in 2010.  Pt states he took pain medication this morning. Pt return to M.D 8/29/2019. Pt ambulates with [employer] sling.      Medical History:   Past Medical History:   Diagnosis Date    Coronary artery disease     Diabetes mellitus     pre-diabetes    Hypertension        Surgical History:   Lorena Rodriguez  has a past surgical history that includes Coronary stent placement; Hernia repair; Hemorrhoid surgery; Hand amputation through metacarpal; Shoulder surgery; Eye surgery; Arthroscopy of shoulder with decompression of subacromial space (Right, 8/20/2019); Arthroscopic repair of rotator cuff of shoulder (Right, 8/20/2019); and Chondroplasty of shoulder (Right, 8/20/2019).    Medications:   Lorena has a current medication list which includes the following prescription(s): amlodipine, co-enzyme q-10, levothyroxine, metformin, metoprolol succinate, nitroglycerin, olmesartan-hydrochlorothiazide, ondansetron, and  oxycodone-acetaminophen.    Allergies:   Review of patient's allergies indicates:  No Known Allergies     Imaging, none:     Prior Therapy: Yes. Many years ago  Social History: lives with their family and lives with their spouse  Occupation: Retired   Prior Level of Function: Independent   Current Level of Function: limiting factors: ADL's and IADL's and functional mobility     Pain:  Current 6/10, worst 10/10, best 5/10   Location: right shoulder   Description: Burning  Aggravating Factors: any shoulder motion  Easing Factors: pain medication    Pts goals: to perform daily activities.     Objective         Observation:     Posture Alignment: rounded shoulders   Shoulder rotation at rest:     Sensation: Light touch: intact to light touch    DTR: intact to light touch    GAIT DEVIATIONS: Ali displays decreased arm swing;  Cervical Range of Motion:    Degrees Pain   Flexion WFL No   Extension WFL No     Right Rotation WFL No   Left Rotation WFL No     Right Side Bending WFL No   Left Side Bending WFL No        Passive Range of Motion (degrees):   Shoulder Right Left   Flexion 55 WFL   Abduction 20 WFL   ER (scapular plane) 10 WFL   IR (scapular plane) 8 WFL      Active Range of Motion in  (degrees):   Shoulder Right Left   Flexion NP due to perform suegery  WFL   Abduction NP due to surgery WFL   ER NP due to surgery  WFL   IR  NP due to surgery  WFL     Upper Extremity Strength  (R) UE  (L) UE    Elbow flexion: NP due to surgery  Elbow flexion: 4+/5   Elbow extension: NP due to surgery  Elbow extension: 4+/5   Shoulder elevation: NP due to surgery  Shoulder elevation: 4+/5   Shoulder flexion: NP due to surgery NP due to surgery  Shoulder flexion: 4+/5   Shoulder Abduction: NP due to surgery  Shoulder abduction: 4+/5   Shoulder ER NP due to surgery  Shoulder ER 4+/5   Shoulder IR NP due to surgery  Shoulder IR 4+/5       Palpation: Infraspinatus Region, Bicipital Groove and Supraspinatus Region       CMS  Impairment/Limitation/Restriction for FOTO Shoulder Survey  Status Limitation G-Code CMS Severity Modifier  Intake 32% 68% Current Status CL - At least 60 percent but less than 80 percent  Predicted 61% 39% Goal Status+ CJ - At least 20 percent but less than 40 percent  D/C Status CL **only report if this is a one time visit    TREATMENT   Treatment Time In: 3:40 pm  Treatment Time Out: 3:55 pm  Total Treatment time separate from Evaluation: 15 minutes    Lorena received therapeutic exercises to develop strength and ROM for 10 minutes including:  Hand gripping in sling   Wrist flexion and extension in sling   Wrist pronation and supination in sling   Pendulum flexion     Ali received the following manual therapy techniques: R shoulder PROM were applied to the: R shoulder for 5 minutes, including:          Home Exercises and Patient Education Provided    Education provided:   - Use proper sling   -Perform exercises 2xper day  -Follow PT and surgeon instructions     Written Home Exercises Provided: Patient instructed to cont prior HEP.  Exercises were reviewed and Lorena was able to demonstrate them prior to the end of the session.  Lorena demonstrated good  understanding of the education provided.     See EMR under Patient Instructions for exercises provided 8/30/2019.    Assessment   Lorena is a 64 y.o. male referred to outpatient Physical Therapy with a medical diagnosis of Right shoulder injury. Pt presents with his wife. Pt presents with [employer] sling on his right shoulder. Pt was instructed to wear surgeon sling to prevent any issues. Pt presents to therapy after R RTC surgery Medium to large tears on 8/20/2019. Pt had surgery 10 days ago. Pt demonstrated decrease of R shoulder AROM and muscle strength due to surgery. During palpation, pt demonstrated increase pain at surgery site. Pt was instructed to follow surgeon protocol to avoid any complication. Pt was given HEP today. Pt will benefit from skilled PT services to  decrease functional limitations.     Follow Protocol: : s/p R RTC surgery Medium to large tears ( greater than 1 cm, less than 5 cm)     Pt prognosis is Good.   Pt will benefit from skilled outpatient Physical Therapy to address the deficits stated above and in the chart below, provide pt/family education, and to maximize pt's level of independence.     Plan of care discussed with patient: Yes  Pt's spiritual, cultural and educational needs considered and patient is agreeable to the plan of care and goals as stated below:     Anticipated Barriers for therapy: Pain    Medical Necessity is demonstrated by the following  History  Co-morbidities and personal factors that may impact the plan of care Co-morbidities:   CAD and high BMI    Personal Factors:   no deficits     low   Examination  Body Structures and Functions, activity limitations and participation restrictions that may impact the plan of care Body Regions:   Shoulder    Body Systems:    ROM  strength  transfers  transitions  motor control    Participation Restrictions:   House work and Community activities     Activity limitations:   Learning and applying knowledge  no deficits    General Tasks and Commands  no deficits    Communication  no deficits    Mobility  lifting and carrying objects    Self care  no deficits    Domestic Life  house work     Interactions/Relationships  no deficits    Life Areas  no deficits    Community and Social Life  community life  recreation and leisure         Complexity: low  See FOTO outcome assessment    Clinical Presentation stable and uncomplicated low   Decision Making/ Complexity Score: low     GOALS: Short Term Goals: 6weeks  1.Report decreased in pain at worse less than  <   / =  5  /10  to increase tolerance for functional mobility.  2. Pt to improve R shoulder passive range of motion (PROM) flexion to  180 deg to allow for improved functional mobility to allow for improvement in IADLs.   3. Pt to report be conscious  of impaired sitting and standing posture daily to decrease pain   4. Pt to tolerate HEP to improve ROM and independence with ADL's    Long Term Goals: 8-12  weeks  1.Report decreased in pain at worse less than  <   / =  2/10  to increase tolerance for functional mobility  2.  Patient will demonstrate improved overall function per FOTO Survey to CJ = at least 20% but < 40% impaired, limited or restricted score or less.  3.Increased R shoulder  MMT 1 grade to increase tolerance for ADL and work activities.  4. Pt to report and demonstrate proper posture in standing and sitting to decrease pain  5. Pt to be Independent with HEP to improve ROM and independence with ADL's.  6. Pt to improve R shoulder active  range of motion (AROM) flexion  to 180 deg to allow for improved functional mobility to allow for improvement in IADLs.     Plan   Plan of care Certification: 8/30/2019 to 12/31/2019    Outpatient Physical Therapy 2 times weekly for 16 weeks to include the following interventions: Manual Therapy, Moist Heat/ Ice, Neuromuscular Re-ed, Patient Education, Therapeutic Activites and Therapeutic Exercise.     Magdy Tovar, PT

## 2019-09-04 ENCOUNTER — CLINICAL SUPPORT (OUTPATIENT)
Dept: REHABILITATION | Facility: HOSPITAL | Age: 64
End: 2019-09-04
Attending: ORTHOPAEDIC SURGERY
Payer: MEDICARE

## 2019-09-04 DIAGNOSIS — M25.511 ACUTE PAIN OF RIGHT SHOULDER: ICD-10-CM

## 2019-09-04 DIAGNOSIS — Z98.890 STATUS POST SHOULDER SURGERY: ICD-10-CM

## 2019-09-04 DIAGNOSIS — R29.898 WEAKNESS OF SHOULDER: ICD-10-CM

## 2019-09-04 DIAGNOSIS — M25.611 DECREASED RIGHT SHOULDER RANGE OF MOTION: ICD-10-CM

## 2019-09-04 PROCEDURE — 97140 MANUAL THERAPY 1/> REGIONS: CPT | Mod: PN

## 2019-09-04 PROCEDURE — 97110 THERAPEUTIC EXERCISES: CPT | Mod: PN

## 2019-09-04 NOTE — PROGRESS NOTES
Physical Therapy Daily Treatment Note     Name: Lorena Rodriguez  Clinic Number: [id number]    Therapy Diagnosis:   Encounter Diagnoses   Name Primary?    Status post shoulder surgery     Weakness of shoulder     Decreased right shoulder range of motion     Acute pain of right shoulder      Physician: Nj Yuo MD    Visit Date: 9/4/2019    Physician Orders: PT Eval and Treat   Medical Diagnosis from Referral: Right shoulder injury  Evaluation Date: 8/30/2019  Authorization Period Expiration:12/31/2019  Plan of Care Expiration: 12/31/2019  Visit # / Visits authorized:2/ 20 PN Due: 9/30/2019    DOS: 8/20/2019   POW: ~2    Time In: 11:00 am  Time Out: 11:50  am  Total Billable Time: 39 minutes    Precautions: s/p R RTC surgery Medium to large tears ( greater than 1 cm, less than 5 cm)     Subjective     Pt reports: that he had mini stroke on Friday night. Pt states he is wearing eye patch to help recovery. Pt reports he was compliant with exercises except for pendulum. Pt states he has pain in the right shoulder and lateral R arm   He was compliant with home exercise program.  Response to previous treatment: Increase soreness   Functional change: No change     Pain: 8/10  Location: right shoulder      Objective      Updated: 9/4/2019    PROM R shoulder:  Flexion 105 deg   ER in scapular plane: 40 deg   IR in scapular planes 40 deg    Lorena received therapeutic exercises to develop strength, endurance, ROM and flexibility for 24 minutes including:      Hand gripping in sling 3x10  Wrist flexion and extension in sling  3x10  Wrist pronation and supination in sling  3x10  Pendulum flexion min  Submaximal pain free isometrics: Flexion(held due to to pain)/Abd/ ER/IR/ elbow flexors 2x10       Lorena received the following manual therapy techniques: PROM were applied to the: R shoulder  for 15 minutes, including:  PROM flexion to tolerance (at least 105 deg)  PROM ER in scapular plane (35- 45 deg)   IR in scapular plane  (35-45 deg)        Ali received cold pack for 10 minutes to R shoulder .       Home Exercises and Patient Education Provided     Education provided:   - Use proper sling   -Perform exercises 2xper day  -Follow PT and surgeon instructions      Written Home Exercises Provided: Patient instructed to cont prior HEP.  Exercises were reviewed and Lorena was able to demonstrate them prior to the end of the session.  Ali demonstrated good  understanding of the education provided.      See EMR under Patient Instructions for exercises provided 8/30/2019.    Assessment     Pt presented to therapy after he had mini-stroke last Friday. Pt was compliant with HEP except for pendulum. Pt ambulate with appropriate sling brace today. Pt demonstrated good hand  muscle strength, forearm AROM, and wrist AROM. V/c's required to perform submaximal isometric exercises painfree. Pt had a little more increase in shoulder flexion isometric exercise so PT decided to stop after 2 reps. Pt demonstrated increase of R RTC muscle guarding during PROM in all planes. Heavy v/c's required to relax R shoulder and Heavy v/'c required to not activate R shoulder during motion. Pt required close supervision for PROM and follow protocol. Cont skilled PT services according to protocol.     Lorena is progressing well towards his goals.   Pt prognosis is Good.     Pt will continue to benefit from skilled outpatient physical therapy to address the deficits listed in the problem list box on initial evaluation, provide pt/family education and to maximize pt's level of independence in the home and community environment.     Pt's spiritual, cultural and educational needs considered and pt agreeable to plan of care and goals.    Anticipated barriers to physical therapy: None    GOALS: Short Term Goals: 6weeks  1.Report decreased in pain at worse less than  <   / =  5  /10  to increase tolerance for functional mobility.  2. Pt to improve R shoulder passive range of  motion (PROM) flexion to  180 deg to allow for improved functional mobility to allow for improvement in IADLs.   3. Pt to report be conscious of impaired sitting and standing posture daily to decrease pain   4. Pt to tolerate HEP to improve ROM and independence with ADL's     Long Term Goals: 8-12  weeks  1.Report decreased in pain at worse less than  <   / =  2/10  to increase tolerance for functional mobility  2.  Patient will demonstrate improved overall function per FOTO Survey to CJ = at least 20% but < 40% impaired, limited or restricted score or less.  3.Increased R shoulder  MMT 1 grade to increase tolerance for ADL and work activities.  4. Pt to report and demonstrate proper posture in standing and sitting to decrease pain  5. Pt to be Independent with HEP to improve ROM and independence with ADL's.  6. Pt to improve R shoulder active  range of motion (AROM) flexion  to 180 deg to allow for improved functional mobility to allow for improvement in IADLs.     Plan     Certification date: Plan of care Certification: 8/30/2019 to 12/31/2019  Cont skilled PT session towards PT and patient's goals.    Magdy Tovar, PT   09/04/2019

## 2019-09-06 ENCOUNTER — CLINICAL SUPPORT (OUTPATIENT)
Dept: REHABILITATION | Facility: HOSPITAL | Age: 64
End: 2019-09-06
Attending: ORTHOPAEDIC SURGERY
Payer: MEDICARE

## 2019-09-06 DIAGNOSIS — M25.511 ACUTE PAIN OF RIGHT SHOULDER: ICD-10-CM

## 2019-09-06 DIAGNOSIS — Z98.890 STATUS POST SHOULDER SURGERY: ICD-10-CM

## 2019-09-06 DIAGNOSIS — R29.898 WEAKNESS OF SHOULDER: ICD-10-CM

## 2019-09-06 DIAGNOSIS — M25.611 DECREASED RIGHT SHOULDER RANGE OF MOTION: ICD-10-CM

## 2019-09-06 PROCEDURE — 97110 THERAPEUTIC EXERCISES: CPT | Mod: PN

## 2019-09-06 PROCEDURE — 97140 MANUAL THERAPY 1/> REGIONS: CPT | Mod: PN

## 2019-09-06 NOTE — PROGRESS NOTES
Physical Therapy Daily Treatment Note     Name: Lorena Rodriguez  Clinic Number: [id number]    Therapy Diagnosis:   Encounter Diagnoses   Name Primary?    Status post shoulder surgery     Weakness of shoulder     Decreased right shoulder range of motion     Acute pain of right shoulder      Physician: Nj You MD    Visit Date: 9/6/2019    Physician Orders: PT Eval and Treat   Medical Diagnosis from Referral: Right shoulder injury  Evaluation Date: 8/30/2019  Authorization Period Expiration:12/31/2019  Plan of Care Expiration: 12/31/2019  Visit # / Visits authorized:3/ 20 PN Due: 9/30/2019    DOS: 8/20/2019   POW: ~2    Time In: 10:00 am  Time Out: 10:50  am  Total Billable Time: 40 minutes    Precautions: s/p R RTC surgery Medium to large tears ( greater than 1 cm, less than 5 cm)     Subjective     Pt reports: that he forgot to use sling. Pt ambulates without sling brace today. Pt states he took pain medication today.   He was compliant with home exercise program.  Response to previous treatment: Increase soreness   Functional change: No change     Pain: 5/10  Location: right shoulder      Objective      Updated: 9/4/2019    PROM R shoulder:  Flexion 105 deg   ER in scapular plane: 40 deg   IR in scapular planes 40 deg    Lorena received therapeutic exercises to develop strength, endurance, ROM and flexibility for 25 minutes including:      Hand gripping in sling 3x10  Wrist flexion and extension in sling  3x10  Wrist pronation and supination in sling  3x10  Elbow flexion/extension 30 reps  Seated scapular retraction 3x10  Seated upper traps stretch  3x30 sec   Pendulum flexion 2 min  Submaximal pain free isometrics with elbow bend: Flexion/Abd/ ER/IR/ elbow flexors 3x10       Lorena received the following manual therapy techniques: PROM were applied to the: R shoulder  for 15 minutes, including:  PROM flexion to tolerance (at least 105 deg)  PROM ER in scapular plane (35- 45 deg)   IR in scapular plane (35-45  deg)      Ali received cold pack for 10 minutes to R shoulder .       Home Exercises and Patient Education Provided     Education provided:   - Use proper sling   -Perform exercises 2xper day  -Follow PT and surgeon instructions      Written Home Exercises Provided: Patient instructed to cont prior HEP.  Exercises were reviewed and Lorena was able to demonstrate them prior to the end of the session.  Ali demonstrated good  understanding of the education provided.      See EMR under Patient Instructions for exercises provided 8/30/2019.    Assessment     Pt presented to therapy without sling brace today. Pt states he forgot to wear sling brace. Pt was educated the importance to be compliant with protocol and wear sling brace until he returns to surgeon. Pt demonstrated goo tolerance to pendulum, AROM wrist, elbow today. Heavy v/c's to perform submax isometrics R shoulder flexion, ER, and IR , and and exercises. Pt cont with R shoulder apprhension during PROM. Pt demonstrated increase of R RTC muscle guarding during PROM in all planes. Heavy v/c's required to relax R shoulder and Heavy v/'c required to not activate R shoulder during motion. Pt required close supervision for PROM and follow protocol. Cont skilled PT services according to protocol.     Lorena is progressing well towards his goals.   Pt prognosis is Good.     Pt will continue to benefit from skilled outpatient physical therapy to address the deficits listed in the problem list box on initial evaluation, provide pt/family education and to maximize pt's level of independence in the home and community environment.     Pt's spiritual, cultural and educational needs considered and pt agreeable to plan of care and goals.    Anticipated barriers to physical therapy: None    GOALS: Short Term Goals: 6weeks  1.Report decreased in pain at worse less than  <   / =  5  /10  to increase tolerance for functional mobility.  2. Pt to improve R shoulder passive range of motion  (PROM) flexion to  180 deg to allow for improved functional mobility to allow for improvement in IADLs.   3. Pt to report be conscious of impaired sitting and standing posture daily to decrease pain   4. Pt to tolerate HEP to improve ROM and independence with ADL's     Long Term Goals: 8-12  weeks  1.Report decreased in pain at worse less than  <   / =  2/10  to increase tolerance for functional mobility  2.  Patient will demonstrate improved overall function per FOTO Survey to CJ = at least 20% but < 40% impaired, limited or restricted score or less.  3.Increased R shoulder  MMT 1 grade to increase tolerance for ADL and work activities.  4. Pt to report and demonstrate proper posture in standing and sitting to decrease pain  5. Pt to be Independent with HEP to improve ROM and independence with ADL's.  6. Pt to improve R shoulder active  range of motion (AROM) flexion  to 180 deg to allow for improved functional mobility to allow for improvement in IADLs.     Plan     Certification date: Plan of care Certification: 8/30/2019 to 12/31/2019  Cont skilled PT session towards PT and patient's goals.    Magdy Tovar, PT   09/06/2019

## 2019-09-09 ENCOUNTER — CLINICAL SUPPORT (OUTPATIENT)
Dept: REHABILITATION | Facility: HOSPITAL | Age: 64
End: 2019-09-09
Attending: ORTHOPAEDIC SURGERY
Payer: MEDICARE

## 2019-09-09 DIAGNOSIS — R29.898 WEAKNESS OF SHOULDER: ICD-10-CM

## 2019-09-09 DIAGNOSIS — M25.611 DECREASED RIGHT SHOULDER RANGE OF MOTION: ICD-10-CM

## 2019-09-09 DIAGNOSIS — Z98.890 STATUS POST SHOULDER SURGERY: ICD-10-CM

## 2019-09-09 DIAGNOSIS — M25.511 ACUTE PAIN OF RIGHT SHOULDER: ICD-10-CM

## 2019-09-09 PROCEDURE — 97140 MANUAL THERAPY 1/> REGIONS: CPT | Mod: PN

## 2019-09-09 PROCEDURE — 97110 THERAPEUTIC EXERCISES: CPT | Mod: PN

## 2019-09-09 NOTE — PROGRESS NOTES
Physical Therapy Daily Treatment Note     Name: Lorena Rodriguez  Clinic Number: [id number]    Therapy Diagnosis:   Encounter Diagnoses   Name Primary?    Status post shoulder surgery     Weakness of shoulder     Decreased right shoulder range of motion     Acute pain of right shoulder      Physician: Nj You MD    Visit Date: 9/9/2019    Physician Orders: PT Eval and Treat   Medical Diagnosis from Referral: Right shoulder injury  Evaluation Date: 8/30/2019  Authorization Period Expiration:12/31/2019  Plan of Care Expiration: 12/31/2019  Visit # / Visits authorized:3/ 20 PN Due: 9/30/2019    DOS: 8/20/2019   POW: 3    Time In: 1030  Time Out: 1125  am  Total Billable Time: 40 minutes    Precautions: s/p R RTC surgery Medium to large tears ( greater than 1 cm, less than 5 cm)     Subjective     Pt reports: the shoulder continues to improve.   He was compliant with home exercise program.  Response to previous treatment: Increase soreness   Functional change: No change     Pain: 4/10  Location: right shoulder      Objective      Updated: 9/4/2019    PROM R shoulder:  Flexion 105 deg   ER in scapular plane: 40 deg   IR in scapular planes 40 deg    Ali received therapeutic exercises to develop strength, endurance, ROM and flexibility for 25 minutes including:      Hand gripping in sling 3x10  Wrist flexion and extension in sling  3x10  Wrist pronation and supination in sling  3x10  Elbow flexion/extension 30 reps  Seated scapular retraction 3x10  Seated upper traps stretch  3x30 sec   Pendulum flexion 2 min  Submaximal pain free isometrics with elbow bend: Flexion/Abd/ ER/IR/ elbow flexors 3x10   Supine wand ER/IR in scapular plane x 15      Ali received the following manual therapy techniques: PROM were applied to the: R shoulder  for 15 minutes, including:  PROM flexion to tolerance (at least 105 deg)  PROM ER in scapular plane (35- 45 deg)   IR in scapular plane (35-45 deg)      Ali received cold pack for 10  minutes to R shoulder .       Home Exercises and Patient Education Provided     Education provided:   - Use proper sling   -Perform exercises 2xper day  -Follow PT and surgeon instructions      Written Home Exercises Provided: Patient instructed to cont prior HEP.  Exercises were reviewed and Lorena was able to demonstrate them prior to the end of the session.  Lorena demonstrated good  understanding of the education provided.      See EMR under Patient Instructions for exercises provided 8/30/2019.    Assessment     Improved tolerance to passive stretching.  Good response to exercise progression per protocol. Pt demonstrated decrease of R RTC muscle guarding during PROM in all planes. Heavy v/c's required to relax R shoulder and Heavy v/'c required to not activate R shoulder during motion. Pt required close supervision for PROM and follow protocol. Cont skilled PT services according to protocol.     Lorena is progressing well towards his goals.   Pt prognosis is Good.     Pt will continue to benefit from skilled outpatient physical therapy to address the deficits listed in the problem list box on initial evaluation, provide pt/family education and to maximize pt's level of independence in the home and community environment.     Pt's spiritual, cultural and educational needs considered and pt agreeable to plan of care and goals.    Anticipated barriers to physical therapy: None    GOALS: Short Term Goals: 6weeks  1.Report decreased in pain at worse less than  <   / =  5  /10  to increase tolerance for functional mobility.  2. Pt to improve R shoulder passive range of motion (PROM) flexion to  180 deg to allow for improved functional mobility to allow for improvement in IADLs.   3. Pt to report be conscious of impaired sitting and standing posture daily to decrease pain   4. Pt to tolerate HEP to improve ROM and independence with ADL's     Long Term Goals: 8-12  weeks  1.Report decreased in pain at worse less than  <   / =   2/10  to increase tolerance for functional mobility  2.  Patient will demonstrate improved overall function per FOTO Survey to CJ = at least 20% but < 40% impaired, limited or restricted score or less.  3.Increased R shoulder  MMT 1 grade to increase tolerance for ADL and work activities.  4. Pt to report and demonstrate proper posture in standing and sitting to decrease pain  5. Pt to be Independent with HEP to improve ROM and independence with ADL's.  6. Pt to improve R shoulder active  range of motion (AROM) flexion  to 180 deg to allow for improved functional mobility to allow for improvement in IADLs.     Plan     Certification date: Plan of care Certification: 8/30/2019 to 12/31/2019  Cont skilled PT session towards PT and patient's goals.    Cong Campoverde, PT   09/09/2019

## 2019-09-11 ENCOUNTER — CLINICAL SUPPORT (OUTPATIENT)
Dept: REHABILITATION | Facility: HOSPITAL | Age: 64
End: 2019-09-11
Attending: ORTHOPAEDIC SURGERY
Payer: MEDICARE

## 2019-09-11 DIAGNOSIS — R29.898 WEAKNESS OF SHOULDER: ICD-10-CM

## 2019-09-11 DIAGNOSIS — Z98.890 STATUS POST SHOULDER SURGERY: ICD-10-CM

## 2019-09-11 DIAGNOSIS — M25.611 DECREASED RIGHT SHOULDER RANGE OF MOTION: ICD-10-CM

## 2019-09-11 DIAGNOSIS — M25.511 ACUTE PAIN OF RIGHT SHOULDER: ICD-10-CM

## 2019-09-11 PROCEDURE — 97140 MANUAL THERAPY 1/> REGIONS: CPT | Mod: PN

## 2019-09-11 PROCEDURE — 97110 THERAPEUTIC EXERCISES: CPT | Mod: PN

## 2019-09-11 NOTE — PROGRESS NOTES
Physical Therapy Daily Treatment Note     Name: Lorena Rodriguez  Clinic Number: [id number]    Therapy Diagnosis:   Encounter Diagnoses   Name Primary?    Status post shoulder surgery     Weakness of shoulder     Decreased right shoulder range of motion     Acute pain of right shoulder      Physician: Nj You MD    Visit Date: 9/11/2019    Physician Orders: PT Eval and Treat   Medical Diagnosis from Referral: Right shoulder injury  Evaluation Date: 8/30/2019  Authorization Period Expiration:12/31/2019  Plan of Care Expiration: 12/31/2019  Visit # / Visits authorized:4/ 20 PN Due: 9/30/2019    DOS: 8/20/2019   POW: 3    Time In: 11:00 AM  Time Out: 11:50 aM  Total Billable Time: 40 minutes    Precautions: s/p R RTC surgery Medium to large tears ( greater than 1 cm, less than 5 cm)     Subjective     Pt reports: the shoulder continues to improve. Pt states he has less R shoulder pain. Pt reports he was not sore after last PT session.   He was compliant with home exercise program.  Response to previous treatment: Increase soreness   Functional change: No change     Pain: 4/10  Location: right shoulder      Objective      Updated: 9/11/2019    PROM R shoulder:  Flexion 110 deg   ER in scapular plane: 45 deg   IR in scapular planes 45 deg    Lorena received therapeutic exercises to develop strength, endurance, ROM and flexibility for 25 minutes including:      Hand gripping in sling 3x10  Supine Wrist flexion and extension  3x10  Wrist pronation and supination in sling  3x10  Elbow flexion/extension 30 reps  Seated scapular retraction 3x10  Seated upper traps stretch  3x30 sec   Pendulum flexion 2 min  Submaximal pain free isometrics with elbow bend: Flexion/Abd/ ER/IR/ elbow flexors 3x10   Supine AAROM wand ER/IR in scapular plane (up to 45 deg AAROM) x 30      Lorena received the following manual therapy techniques: PROM were applied to the: R shoulder  for 15 minutes, including:  PROM flexion to tolerance ( 110  deg)  PROM ER in scapular plane ( 45 deg)   IR in scapular plane (45 deg)      Ali received cold pack for 10 minutes to R shoulder .       Home Exercises and Patient Education Provided     Education provided:   - Use proper sling   -Perform exercises 2xper day  -Follow PT and surgeon instructions      Written Home Exercises Provided: Patient instructed to cont prior HEP.  Exercises were reviewed and Lorena was able to demonstrate them prior to the end of the session.  Ali demonstrated good  understanding of the education provided.      See EMR under Patient Instructions for exercises provided 8/30/2019.    Assessment     Pt tolerated therapy well today with no provocation of R shoulder pain. Pt tolerated progression of PROM in flexion, ER, and IR. Pt was pain free during PROM exercises today. Pt was educated to no WB with his arm until clear by the physician. Pt demonstrated less apprehension during PROM ad less upper traps over-activation. Pt cont to progress according to protocol. Pt was educated the importance to follow protocoll. Pt was educated to wear sling brace until clear by the physician. Cont skilled PT services according to protocol.     Lorena is progressing well towards his goals.   Pt prognosis is Good.     Pt will continue to benefit from skilled outpatient physical therapy to address the deficits listed in the problem list box on initial evaluation, provide pt/family education and to maximize pt's level of independence in the home and community environment.     Pt's spiritual, cultural and educational needs considered and pt agreeable to plan of care and goals.    Anticipated barriers to physical therapy: None    GOALS: Short Term Goals: 6weeks  1.Report decreased in pain at worse less than  <   / =  5  /10  to increase tolerance for functional mobility. On going  2. Pt to improve R shoulder passive range of motion (PROM) flexion to  180 deg to allow for improved functional mobility to allow for  improvement in IADLs. On going  3. Pt to report be conscious of impaired sitting and standing posture daily to decrease pain. On going  4. Pt to tolerate HEP to improve ROM and independence with ADL's. On going     Long Term Goals: 8-12  weeks  1.Report decreased in pain at worse less than  <   / =  2/10  to increase tolerance for functional mobility. On going  2.  Patient will demonstrate improved overall function per FOTO Survey to CJ = at least 20% but < 40% impaired, limited or restricted score or less.On going  3.Increased R shoulder  MMT 1 grade to increase tolerance for ADL and work activities.On going  4. Pt to report and demonstrate proper posture in standing and sitting to decrease pain. On going  5. Pt to be Independent with HEP to improve ROM and independence with ADL's.On going  6. Pt to improve R shoulder active  range of motion (AROM) flexion  to 180 deg to allow for improved functional mobility to allow for improvement in IADLs. On going    Plan     Certification date: Plan of care Certification: 8/30/2019 to 12/31/2019  Cont skilled PT session towards PT and patient's goals.    Magdy Tovar, PT   09/11/2019

## 2019-09-16 ENCOUNTER — CLINICAL SUPPORT (OUTPATIENT)
Dept: REHABILITATION | Facility: HOSPITAL | Age: 64
End: 2019-09-16
Attending: ORTHOPAEDIC SURGERY
Payer: MEDICARE

## 2019-09-16 DIAGNOSIS — R29.898 WEAKNESS OF SHOULDER: ICD-10-CM

## 2019-09-16 DIAGNOSIS — M25.511 ACUTE PAIN OF RIGHT SHOULDER: ICD-10-CM

## 2019-09-16 DIAGNOSIS — Z98.890 STATUS POST SHOULDER SURGERY: ICD-10-CM

## 2019-09-16 DIAGNOSIS — M25.611 DECREASED RIGHT SHOULDER RANGE OF MOTION: ICD-10-CM

## 2019-09-16 PROCEDURE — 97110 THERAPEUTIC EXERCISES: CPT | Mod: PN

## 2019-09-16 PROCEDURE — 97140 MANUAL THERAPY 1/> REGIONS: CPT | Mod: PN

## 2019-09-16 NOTE — PROGRESS NOTES
Physical Therapy Daily Treatment Note     Name: Lorena Rodriguez  Clinic Number: [id number]    Therapy Diagnosis:   Encounter Diagnoses   Name Primary?    Status post shoulder surgery     Weakness of shoulder     Decreased right shoulder range of motion     Acute pain of right shoulder      Physician: Nj You MD    Visit Date: 9/16/2019    Physician Orders: PT Eval and Treat   Medical Diagnosis from Referral: Right shoulder injury  Evaluation Date: 8/30/2019  Authorization Period Expiration:12/31/2019  Plan of Care Expiration: 12/31/2019  Visit # / Visits authorized:4/ 20 PN Due: 9/30/2019    DOS: 8/20/2019   POW: 3    Return to M.D on 9/25/2019    Time In: 10:30  AM  Time Out: 11:20 aM  Total Billable Time: 30 minutes    Precautions: s/p R RTC surgery Medium to large tears ( greater than 1 cm, less than 5 cm)     Subjective     Pt reports: the shoulder continues to improve. Pt did not c/o of any issue. Pt cont complaint with sling brace. Pt c/o of pain lateral left arm.   He was compliant with home exercise program.  Response to previous treatment: Increase soreness   Functional change: No change     Pain: 3/10  Location: right shoulder      Objective      Updated: 9/11/2019    PROM R shoulder:  Flexion 110 deg   ER in scapular plane: 45 deg   IR in scapular planes 45 deg    Lorena received therapeutic exercises to develop strength, endurance, ROM and flexibility for 30 minutes including:      Hand gripping in sling 3x10  Supine Wrist flexion and extension  3x10  Wrist pronation and supination in sling  3x10  Elbow flexion/extension 30 reps  Seated scapular retraction 3x10  Seated upper traps stretch  3x30 sec   Pendulum flexion 2 min  Submaximal pain free isometrics with elbow bend: Flexion/Abd/ ER/IR/ elbow flexors 3x10   Supine AAROM wand ER/IR in scapular plane (up to 45 deg AAROM) x 30      Lorena received the following manual therapy techniques: PROM were applied to the: R shoulder  for 10 minutes,  including:  PROM flexion to tolerance ( 115 deg)  PROM ER in scapular plane ( 45 deg)   IR in scapular plane (45 deg)      Ali received cold pack for 10 minutes to R shoulder .       Home Exercises and Patient Education Provided     Education provided:   - Use proper sling   -Perform exercises 2xper day  -Follow PT and surgeon instructions      Written Home Exercises Provided: Patient instructed to cont prior HEP.  Exercises were reviewed and Lorena was able to demonstrate them prior to the end of the session.  Ali demonstrated good  understanding of the education provided.      See EMR under Patient Instructions for exercises provided 8/30/2019.    Assessment     Pt tolerated therapy well today with minor provocation of  of R shoulder pain. Pt cont of pain during isometric R shoulder exercise. Vc's required to perform exercises with pain free. Pt  Cont tolerated progression of PROM in flexion, ER, and IR. Pt was pain free during PROM exercises today. Pt demonstrated no pain during PROM but he fel some muscle pulling. Pt demonstrated decrease apprehension during PROM ad less upper traps over-activation. Pt cont to progress according to protocol. Pt was educated the importance to follow protocoll. Pt was educated to wear sling brace until clear by the physician. Cont skilled PT services according to protocol.     Lorena is progressing well towards his goals.   Pt prognosis is Good.     Pt will continue to benefit from skilled outpatient physical therapy to address the deficits listed in the problem list box on initial evaluation, provide pt/family education and to maximize pt's level of independence in the home and community environment.     Pt's spiritual, cultural and educational needs considered and pt agreeable to plan of care and goals.    Anticipated barriers to physical therapy: None    GOALS: Short Term Goals: 6weeks  1.Report decreased in pain at worse less than  <   / =  5  /10  to increase tolerance for  functional mobility. On going  2. Pt to improve R shoulder passive range of motion (PROM) flexion to  180 deg to allow for improved functional mobility to allow for improvement in IADLs. On going  3. Pt to report be conscious of impaired sitting and standing posture daily to decrease pain. On going  4. Pt to tolerate HEP to improve ROM and independence with ADL's. On going     Long Term Goals: 8-12  weeks  1.Report decreased in pain at worse less than  <   / =  2/10  to increase tolerance for functional mobility. On going  2.  Patient will demonstrate improved overall function per FOTO Survey to CJ = at least 20% but < 40% impaired, limited or restricted score or less.On going  3.Increased R shoulder  MMT 1 grade to increase tolerance for ADL and work activities.On going  4. Pt to report and demonstrate proper posture in standing and sitting to decrease pain. On going  5. Pt to be Independent with HEP to improve ROM and independence with ADL's.On going  6. Pt to improve R shoulder active  range of motion (AROM) flexion  to 180 deg to allow for improved functional mobility to allow for improvement in IADLs. On going    Plan     Certification date: Plan of care Certification: 8/30/2019 to 12/31/2019  Cont skilled PT session towards PT and patient's goals.    Magdy Tovar, PT   09/16/2019

## 2019-09-18 ENCOUNTER — CLINICAL SUPPORT (OUTPATIENT)
Dept: REHABILITATION | Facility: HOSPITAL | Age: 64
End: 2019-09-18
Attending: ORTHOPAEDIC SURGERY
Payer: MEDICARE

## 2019-09-18 DIAGNOSIS — M25.511 ACUTE PAIN OF RIGHT SHOULDER: ICD-10-CM

## 2019-09-18 DIAGNOSIS — R29.898 WEAKNESS OF SHOULDER: ICD-10-CM

## 2019-09-18 DIAGNOSIS — Z98.890 STATUS POST SHOULDER SURGERY: ICD-10-CM

## 2019-09-18 DIAGNOSIS — M25.611 DECREASED RIGHT SHOULDER RANGE OF MOTION: ICD-10-CM

## 2019-09-18 PROCEDURE — 97110 THERAPEUTIC EXERCISES: CPT | Mod: PN

## 2019-09-18 PROCEDURE — 97140 MANUAL THERAPY 1/> REGIONS: CPT | Mod: PN

## 2019-09-18 NOTE — PROGRESS NOTES
Physical Therapy Daily Treatment Note     Name: Lorena Rodriguez  Clinic Number: [id number]    Therapy Diagnosis:   Encounter Diagnoses   Name Primary?    Status post shoulder surgery     Weakness of shoulder     Decreased right shoulder range of motion     Acute pain of right shoulder      Physician: Nj You MD    Visit Date: 9/18/2019    Physician Orders: PT Eval and Treat   Medical Diagnosis from Referral: Right shoulder injury  Evaluation Date: 8/30/2019  Authorization Period Expiration:12/31/2019  Plan of Care Expiration: 12/31/2019  Visit # / Visits authorized:7/ 20 PN Due: 9/30/2019    DOS: 8/20/2019   POW:~ 4    Return to M.D on 9/25/2019    Time In: 11:00  AM  Time Out: 11:50 aM  Total Billable Time: 30 minutes    Precautions: s/p R RTC surgery Medium to large tears ( greater than 1 cm, less than 5 cm)     Subjective     Pt reports: the shoulder continues to improve. Pt did not c/o of any issue. Pt cont complaint with sling brace. Pt c/o of pain lateral left arm. Pt states he will return to eye physician tomorrow.   He was compliant with home exercise program.  Response to previous treatment: Increase soreness   Functional change: No change     Pain: 2-3/10  Location: right shoulder      Objective      Updated: 9/11/2019    PROM R shoulder:  Flexion 110  deg   ER in scapular plane: 45 deg   IR in scapular planes 45 deg    Lorena received therapeutic exercises to develop strength, endurance, ROM and flexibility for 30 minutes including:      Hand gripping in sling 3x10  Supine Wrist flexion and extension  3x10  Wrist pronation and supination in sling  3x10  Elbow flexion/extension 30 reps  Seated scapular retraction 3x10  Seated upper traps stretch  3x30 sec   Pendulum flexion 2 min  Submaximal pain free isometrics with elbow bend: Flexion/Abd/ ER/IR/ elbow flexors 3x10   Supine AAROM wand ER/IR in scapular plane (up to 45 deg AAROM) x 30      Lorena received the following manual therapy techniques: PROM  were applied to the: R shoulder  for 10 minutes, including:  PROM flexion to tolerance ( 115 deg)  PROM ER in scapular plane ( 45 deg)   IR in scapular plane (45 deg)      Ali received cold pack for 10 minutes to R shoulder .       Home Exercises and Patient Education Provided     Education provided:   - Use proper sling   -Perform exercises 2xper day  -Follow PT and surgeon instructions      Written Home Exercises Provided: Patient instructed to cont prior HEP.  Exercises were reviewed and Lorena was able to demonstrate them prior to the end of the session.  Lorena demonstrated good  understanding of the education provided.      See EMR under Patient Instructions for exercises provided 8/30/2019.    Assessment     Pt tolerated therapy well today with no provocation of  of R shoulder pain. Pt cont to demonstrated good R shoulder PROM in flexion, ER, and IR. Pt is ready to progress towards full PROM according to protocol. Pt cont demonstrated good isometric shoulder flexion, abd, ER, IR exercises. V/c's required to perform exercises with pain free. Pt demonstrated over-activation of R upper traps during exercises today. V/c's also required to perform relax upper traps during exercises. Pt cont to progress according to protocol. Pt was educated the importance to follow protocol. Pt was educated to wear sling brace until clear by the physician. Cont skilled PT services according to protocol.     Lorena is progressing well towards his goals.   Pt prognosis is Good.     Pt will continue to benefit from skilled outpatient physical therapy to address the deficits listed in the problem list box on initial evaluation, provide pt/family education and to maximize pt's level of independence in the home and community environment.     Pt's spiritual, cultural and educational needs considered and pt agreeable to plan of care and goals.    Anticipated barriers to physical therapy: None    GOALS: Short Term Goals: 6weeks  1.Report decreased  in pain at worse less than  <   / =  5  /10  to increase tolerance for functional mobility. On going  2. Pt to improve R shoulder passive range of motion (PROM) flexion to  180 deg to allow for improved functional mobility to allow for improvement in IADLs. On going  3. Pt to report be conscious of impaired sitting and standing posture daily to decrease pain. On going  4. Pt to tolerate HEP to improve ROM and independence with ADL's. On going     Long Term Goals: 8-12  weeks  1.Report decreased in pain at worse less than  <   / =  2/10  to increase tolerance for functional mobility. On going  2.  Patient will demonstrate improved overall function per FOTO Survey to CJ = at least 20% but < 40% impaired, limited or restricted score or less.On going  3.Increased R shoulder  MMT 1 grade to increase tolerance for ADL and work activities.On going  4. Pt to report and demonstrate proper posture in standing and sitting to decrease pain. On going  5. Pt to be Independent with HEP to improve ROM and independence with ADL's.On going  6. Pt to improve R shoulder active  range of motion (AROM) flexion  to 180 deg to allow for improved functional mobility to allow for improvement in IADLs. On going    Plan     Certification date: Plan of care Certification: 8/30/2019 to 12/31/2019  Cont skilled PT session towards PT and patient's goals.    Magdy Tovar, PT   09/18/2019

## 2019-09-20 NOTE — PROGRESS NOTES
Physical Therapy Daily Treatment Note     Name: Lorena Rodriguez  Clinic Number: [id number]    Therapy Diagnosis:   Encounter Diagnoses   Name Primary?    Status post shoulder surgery     Weakness of shoulder     Decreased right shoulder range of motion     Acute pain of right shoulder      Physician: Nj You MD    Visit Date: 9/23/2019    Physician Orders: PT Eval and Treat   Medical Diagnosis from Referral: Right shoulder injury  Evaluation Date: 8/30/2019  Authorization Period Expiration:12/31/2019  Plan of Care Expiration: 12/31/2019  Visit # / Visits authorized:8/ 20 PN Due: 10/23/2019    DOS: 8/20/2019   POW:~ 5    Return to M.D on 9/25/2019    Time In: 11:00  AM  Time Out: 11:50 aM  Total Billable Time: 40 minutes    Precautions: s/p R RTC surgery Medium to large tears ( greater than 1 cm, less than 5 cm)     Subjective     Pt reports: the shoulder continues to improve. Pt did not c/o of any issue. Pt denies any R shoulder pain, but he has right arm pain. Pt states he will return to M.D on 9/25/2019.   He was compliant with home exercise program.  Response to previous treatment: Increase soreness   Functional change: No change     Pain: 0/10  Location: right shoulder      Objective      Updated: 9/11/2019    PROM R shoulder:  Flexion 180  deg   ER in scapular plane: 80 deg   IR in scapular planes 90 deg    CMS Impairment/Limitation/Restriction for FOTO Shoulder Survey  Status Limitation G-Code CMS Severity Modifier  Intake 32% 68%  Predicted 61% 39% Goal Status+ CJ - At least 20 percent but less than 40 percent  9/23/2019 50% 50% Current Status CK - At least 40 percent but less than 60 percent  D/C Status CK **only report if this is discharge survey    Lorena received therapeutic exercises to develop strength, endurance, ROM and flexibility for 30 minutes including:      Hand gripping in sling 3x10  Supine Wrist flexion and extension  3x10  Wrist pronation and supination in sling  3x10  Elbow  flexion/extension 30 reps  Seated scapular retraction 3x10  Seated upper traps stretch  3x30 sec   Pendulum flexion 2 min  Submaximal pain free isometrics with elbow bend: Flexion/Abd/ ER/IR/ elbow flexors 3x10   Supine AAROM wand ER/IR in scapular plane (up to 45 deg AAROM) x 30      Ali received the following manual therapy techniques: PROM were applied to the: R shoulder  for 10 minutes, including:  PROM flexion to tolerance ( 180 deg)  PROM ER in scapular plane ( 80 deg)   IR in scapular plane (90deg)      Ali received cold pack for 10 minutes to R shoulder .       Home Exercises and Patient Education Provided     Education provided:   - Use proper sling   -Perform exercises 2xper day  -Follow PT and surgeon instructions      Written Home Exercises Provided: Patient instructed to cont prior HEP.  Exercises were reviewed and Ali was able to demonstrate them prior to the end of the session.  Ali demonstrated good  understanding of the education provided.      See EMR under Patient Instructions for exercises provided 8/30/2019.    Assessment     Pt tolerated therapy well today with no provocation of  of R shoulder pain. Pt is  5 weeks s/p  s/p R RTC surgery Medium to large tear. Pt has been tolerating PT session well. Pt was re-assessed today. Pt demonstrated increase in R shoulder PROM. Pt demonstrated R shoulder PROM in flexion of 180 deg with no pain, ER in scapular plane 80 deg with no pain, and IR in scapular plane 90 deg with no pain. Pt has been compliant with wearing sling brace. Pt demonstrated good tolerance to isometric exercises for R shoulder, but pt has minimal pain during isometric shoulder flexion. V/c's required to perform exercise with pain free. Pt demonstrated conscious of his sitting and standing posture. Pt has been compliant with HEP. Pt has met 4/4 STGs. Overall, pt has been tolerating therapy well according to protocol. Pt will return to surgeon on 9/25/2019. Cont skilled PT services  according to protocol.     Lorena is progressing well towards his goals.   Pt prognosis is Good.     Pt will continue to benefit from skilled outpatient physical therapy to address the deficits listed in the problem list box on initial evaluation, provide pt/family education and to maximize pt's level of independence in the home and community environment.     Pt's spiritual, cultural and educational needs considered and pt agreeable to plan of care and goals.    Anticipated barriers to physical therapy: None    GOALS: Short Term Goals: 6weeks  1.Report decreased in pain at worse less than  <   / =  5  /10  to increase tolerance for functional mobility. MET 9-   2. Pt to improve R shoulder passive range of motion (PROM) flexion to  180 deg to allow for improved functional mobility to allow for improvement in IADLs. MET 9-  3. Pt to report be conscious of impaired sitting and standing posture daily to decrease pain. MET 9-  4. Pt to tolerate HEP to improve ROM and independence with ADL's. MET 9-     Long Term Goals: 8-12  weeks  1.Report decreased in pain at worse less than  <   / =  2/10  to increase tolerance for functional mobility. On going  2.  Patient will demonstrate improved overall function per FOTO Survey to CJ = at least 20% but < 40% impaired, limited or restricted score or less.On going  3.Increased R shoulder  MMT 1 grade to increase tolerance for ADL and work activities.On going  4. Pt to report and demonstrate proper posture in standing and sitting to decrease pain. On going  5. Pt to be Independent with HEP to improve ROM and independence with ADL's.On going  6. Pt to improve R shoulder active  range of motion (AROM) flexion  to 180 deg to allow for improved functional mobility to allow for improvement in IADLs. On going    Plan     Certification date: Plan of care Certification: 8/30/2019 to 12/31/2019  Cont skilled PT session towards PT and patient's goals.    Magdy  Guy, PT   09/23/2019

## 2019-09-23 ENCOUNTER — CLINICAL SUPPORT (OUTPATIENT)
Dept: REHABILITATION | Facility: HOSPITAL | Age: 64
End: 2019-09-23
Attending: ORTHOPAEDIC SURGERY
Payer: MEDICARE

## 2019-09-23 DIAGNOSIS — M25.611 DECREASED RIGHT SHOULDER RANGE OF MOTION: ICD-10-CM

## 2019-09-23 DIAGNOSIS — Z98.890 STATUS POST SHOULDER SURGERY: ICD-10-CM

## 2019-09-23 DIAGNOSIS — R29.898 WEAKNESS OF SHOULDER: ICD-10-CM

## 2019-09-23 DIAGNOSIS — M25.511 ACUTE PAIN OF RIGHT SHOULDER: ICD-10-CM

## 2019-09-23 PROCEDURE — 97110 THERAPEUTIC EXERCISES: CPT | Mod: PN

## 2019-09-23 PROCEDURE — 97140 MANUAL THERAPY 1/> REGIONS: CPT | Mod: PN

## 2019-09-27 NOTE — PROGRESS NOTES
Physical Therapy Daily Treatment Note     Name: Lorena Rodriguez  Clinic Number: [id number]    Therapy Diagnosis:   Encounter Diagnoses   Name Primary?    Status post shoulder surgery     Weakness of shoulder     Decreased right shoulder range of motion     Acute pain of right shoulder      Physician: Nj You MD    Visit Date: 9/30/2019    Physician Orders: PT Eval and Treat   Medical Diagnosis from Referral: Right shoulder injury  Evaluation Date: 8/30/2019  Authorization Period Expiration:12/31/2019  Plan of Care Expiration: 12/31/2019  Visit # / Visits authorized:9/ 20 PN Due: 10/23/2019    DOS: 8/20/2019   POW:~ 6    Return to M.D: about 10:30-19    Time In: 11:00  AM  Time Out: 11:50 aM  Total Billable Time: 40 minutes    Precautions: s/p R RTC surgery Medium to large tears ( greater than 1 cm, less than 5 cm)     Subjective     Pt reports: the shoulder continues to improve. Pt states he return to M.D. Pt reports that M.D said everything is going well and cont with therapy. Pt has not been wearing sling inside of house, but he wears when he goes outside. Pt c/o of lateral R arm pain.   He was compliant with home exercise program.  Response to previous treatment: Increase soreness   Functional change: No change     Pain: 0/10  Location: right shoulder      Objective      Updated: 9/11/2019    PROM R shoulder:  Flexion 180  deg   ER in scapular plane: 80 deg   IR in scapular planes 90 deg    CMS Impairment/Limitation/Restriction for FOTO Shoulder Survey  Status Limitation G-Code CMS Severity Modifier  Intake 32% 68%  Predicted 61% 39% Goal Status+ CJ - At least 20 percent but less than 40 percent  9/23/2019 50% 50% Current Status CK - At least 40 percent but less than 60 percent  D/C Status CK **only report if this is discharge survey    Lorena received therapeutic exercises to develop strength, endurance, ROM and flexibility for 30 minutes including:    Pendulum flexion 2 min  Pulleys flexion in scapular  planes 3min AAROM  Seated Elbow flexion/extension 30 reps  Seated scapular retraction 3x10  Seated upper traps stretch  3x30 sec   Submaximal pain free isometrics with elbow bend: Flexion/Abd/ ER/IR/ elbow flexors 3x10   Supine AAROM wand ER/IR in scapular plane (up to 45 deg AAROM) x 30  Supine AAROM white wand shoulder flexion in scapular plane 3x10 (minor PT assistance during motion)   Prone rows to neutral arm position 3x10    NP:  Hand gripping in sling 3x10  Supine Wrist flexion and extension  3x10  Wrist pronation and supination in sling  3x10      Ali received the following manual therapy techniques: PROM were applied to the: R shoulder  for 10 minutes, including:  PROM flexion to tolerance ( 180 deg)  PROM ER in scapular plane ( 80 deg)   IR in scapular plane (90deg)      Ali received cold pack for 10 minutes to R shoulder .       Home Exercises and Patient Education Provided     Education provided:   - Use proper sling   -Perform exercises 2xper day  -Follow PT and surgeon instructions      Written Home Exercises Provided: Patient instructed to cont prior HEP.  Exercises were reviewed and Ali was able to demonstrate them prior to the end of the session.  Ali demonstrated good  understanding of the education provided.      See EMR under Patient Instructions for exercises provided 8/30/2019.    Assessment     Pt tolerated therapy well today with no provocation of  of R shoulder pain. Pt is  5 weeks s/p  s/p R RTC surgery Medium to large tear. Pt has been tolerating PT session well. Pt tolerated progression of therapeutic exercises well today. Pt was able to perform AAROM pulley's and supine AAROM flexion in scapular plane with no increase of R shoulder pain. V/c's were required to perform exercises with proper form and pain free. Pt was able o tolerated prone rows without any issues today. Pt cont with full PROM in flexion, ER, and IR. Pt is ready to be progressed next visit according to protocol. Cont  skilled PT services according to protocol.     Lorena is progressing well towards his goals.   Pt prognosis is Good.     Pt will continue to benefit from skilled outpatient physical therapy to address the deficits listed in the problem list box on initial evaluation, provide pt/family education and to maximize pt's level of independence in the home and community environment.     Pt's spiritual, cultural and educational needs considered and pt agreeable to plan of care and goals.    Anticipated barriers to physical therapy: None    GOALS: Short Term Goals: 6weeks  1.Report decreased in pain at worse less than  <   / =  5  /10  to increase tolerance for functional mobility. MET 9-   2. Pt to improve R shoulder passive range of motion (PROM) flexion to  180 deg to allow for improved functional mobility to allow for improvement in IADLs. MET 9-  3. Pt to report be conscious of impaired sitting and standing posture daily to decrease pain. MET 9-  4. Pt to tolerate HEP to improve ROM and independence with ADL's. MET 9-     Long Term Goals: 8-12  weeks  1.Report decreased in pain at worse less than  <   / =  2/10  to increase tolerance for functional mobility. On going  2.  Patient will demonstrate improved overall function per FOTO Survey to CJ = at least 20% but < 40% impaired, limited or restricted score or less.On going  3.Increased R shoulder  MMT 1 grade to increase tolerance for ADL and work activities.On going  4. Pt to report and demonstrate proper posture in standing and sitting to decrease pain. On going  5. Pt to be Independent with HEP to improve ROM and independence with ADL's.On going  6. Pt to improve R shoulder active  range of motion (AROM) flexion  to 180 deg to allow for improved functional mobility to allow for improvement in IADLs. On going    Plan     Certification date: Plan of care Certification: 8/30/2019 to 12/31/2019  Cont skilled PT session towards PT and patient's  goals.    Magdy Tovar, PT   09/30/2019

## 2019-09-30 ENCOUNTER — CLINICAL SUPPORT (OUTPATIENT)
Dept: REHABILITATION | Facility: HOSPITAL | Age: 64
End: 2019-09-30
Attending: ORTHOPAEDIC SURGERY
Payer: MEDICARE

## 2019-09-30 DIAGNOSIS — M25.511 ACUTE PAIN OF RIGHT SHOULDER: ICD-10-CM

## 2019-09-30 DIAGNOSIS — Z98.890 STATUS POST SHOULDER SURGERY: ICD-10-CM

## 2019-09-30 DIAGNOSIS — R29.898 WEAKNESS OF SHOULDER: ICD-10-CM

## 2019-09-30 DIAGNOSIS — M25.611 DECREASED RIGHT SHOULDER RANGE OF MOTION: ICD-10-CM

## 2019-09-30 PROCEDURE — 97110 THERAPEUTIC EXERCISES: CPT | Mod: PN

## 2019-09-30 PROCEDURE — 97140 MANUAL THERAPY 1/> REGIONS: CPT | Mod: PN

## 2019-10-02 ENCOUNTER — CLINICAL SUPPORT (OUTPATIENT)
Dept: REHABILITATION | Facility: HOSPITAL | Age: 64
End: 2019-10-02
Attending: ORTHOPAEDIC SURGERY
Payer: MEDICARE

## 2019-10-02 DIAGNOSIS — Z98.890 STATUS POST SHOULDER SURGERY: ICD-10-CM

## 2019-10-02 DIAGNOSIS — M25.611 DECREASED RIGHT SHOULDER RANGE OF MOTION: ICD-10-CM

## 2019-10-02 DIAGNOSIS — M25.511 ACUTE PAIN OF RIGHT SHOULDER: ICD-10-CM

## 2019-10-02 DIAGNOSIS — R29.898 WEAKNESS OF SHOULDER: ICD-10-CM

## 2019-10-02 PROCEDURE — 97140 MANUAL THERAPY 1/> REGIONS: CPT | Mod: PN

## 2019-10-02 PROCEDURE — 97110 THERAPEUTIC EXERCISES: CPT | Mod: PN

## 2019-10-02 NOTE — PROGRESS NOTES
Physical Therapy Daily Treatment Note     Name: Lorena Rodriguez  Clinic Number: [id number]    Therapy Diagnosis:   Encounter Diagnoses   Name Primary?    Status post shoulder surgery     Weakness of shoulder     Decreased right shoulder range of motion     Acute pain of right shoulder      Physician: Nj You MD    Visit Date: 10/2/2019    Physician Orders: PT Eval and Treat   Medical Diagnosis from Referral: Right shoulder injury  Evaluation Date: 8/30/2019  Authorization Period Expiration:12/31/2019  Plan of Care Expiration: 12/31/2019  Visit # / Visits authorized:10/ 20 PN Due: 10/23/2019    DOS: 8/20/2019   POW:~ 6     Return to M.D: about 10:30-19    Time In: 10:30 AM  Time Out: 11:30  aM  Total Billable Time: 30 minutes    Precautions: s/p R RTC surgery Medium to large tears ( greater than 1 cm, less than 5 cm)     Subjective     Pt reports: the shoulder continues to improve. Pt states he return to M.D. Pt reports that M.D said everything is going well and cont with therapy. Pt has not been wearing sling inside of house, but he wears when he goes outside. Pt c/o of lateral R arm pain.   He was compliant with home exercise program.  Response to previous treatment: Increase soreness   Functional change: No change     Pain: 0/10  Location: right shoulder      Objective      Updated: 9/11/2019    PROM R shoulder:  Flexion 180  deg   ER in scapular plane: 80 deg   IR in scapular planes 90 deg    CMS Impairment/Limitation/Restriction for FOTO Shoulder Survey  Status Limitation G-Code CMS Severity Modifier  Intake 32% 68%  Predicted 61% 39% Goal Status+ CJ - At least 20 percent but less than 40 percent  9/23/2019 50% 50% Current Status CK - At least 40 percent but less than 60 percent  D/C Status CK **only report if this is discharge survey    Lorena received therapeutic exercises to develop strength, endurance, ROM and flexibility for 30 minutes including:    Pendulum flexion 2 min  Pulleys flexion in scapular  planes 3min AAROM  Seated Elbow flexion/extension 30 reps  Seated scapular retraction 3x10  Seated upper traps stretch  3x30 sec   Submaximal pain free isometrics with elbow bend: Flexion/Abd/ ER/IR/ elbow flexors 3x10   Supine AAROM wand ER/IR in scapular plane (up to 45 deg AAROM) x 30  Supine AAROM white wand shoulder flexion in scapular plane 3x10 (minor PT assistance during motion)   Prone rows to neutral arm position 3x10    NP:  Hand gripping in sling 3x10  Supine Wrist flexion and extension  3x10  Wrist pronation and supination in sling  3x10      Ali received the following manual therapy techniques: PROM were applied to the: R shoulder  for 10 minutes, including:  PROM flexion to tolerance ( 180 deg)  PROM ER in scapular plane ( 80 deg)   IR in scapular plane (90deg)       Ali received cold pack for 10 minutes to R shoulder .       Home Exercises and Patient Education Provided     Education provided:   - Use proper sling   -Perform exercises 2xper day  -Follow PT and surgeon instructions      Written Home Exercises Provided: Patient instructed to cont prior HEP.  Exercises were reviewed and Lorena was able to demonstrate them prior to the end of the session.  Lorena demonstrated good  understanding of the education provided.      See EMR under Patient Instructions for exercises provided 8/30/2019.    Assessment     Pt tolerated therapy well today with no provocation of  of R shoulder pain. Pt is  6 weeks s/p  s/p R RTC surgery Medium to large tear. Pt has been tolerating  Pt was able to perform AAROM pulley's and supine AAROM flexion in scapular plane with no increase of R shoulder pain. Pt cont require V/c's were required to perform exercises with proper form and pain free. Pt was able o tolerated prone rows without any issues today. Pt cont with full PROM in flexion, ER, and IR. Cont therapy according to protocol. Cont skilled PT services according to protocol.     Lorena is progressing well towards his goals.    Pt prognosis is Good.     Pt will continue to benefit from skilled outpatient physical therapy to address the deficits listed in the problem list box on initial evaluation, provide pt/family education and to maximize pt's level of independence in the home and community environment.     Pt's spiritual, cultural and educational needs considered and pt agreeable to plan of care and goals.    Anticipated barriers to physical therapy: None    GOALS: Short Term Goals: 6weeks  1.Report decreased in pain at worse less than  <   / =  5  /10  to increase tolerance for functional mobility. MET 9-   2. Pt to improve R shoulder passive range of motion (PROM) flexion to  180 deg to allow for improved functional mobility to allow for improvement in IADLs. MET 9-  3. Pt to report be conscious of impaired sitting and standing posture daily to decrease pain. MET 9-  4. Pt to tolerate HEP to improve ROM and independence with ADL's. MET 9-     Long Term Goals: 8-12  weeks  1.Report decreased in pain at worse less than  <   / =  2/10  to increase tolerance for functional mobility. On going  2.  Patient will demonstrate improved overall function per FOTO Survey to CJ = at least 20% but < 40% impaired, limited or restricted score or less.On going  3.Increased R shoulder  MMT 1 grade to increase tolerance for ADL and work activities.On going  4. Pt to report and demonstrate proper posture in standing and sitting to decrease pain. On going  5. Pt to be Independent with HEP to improve ROM and independence with ADL's.On going  6. Pt to improve R shoulder active  range of motion (AROM) flexion  to 180 deg to allow for improved functional mobility to allow for improvement in IADLs. On going    Plan     Certification date: Plan of care Certification: 8/30/2019 to 12/31/2019  Cont skilled PT session towards PT and patient's goals.    Magdy Tovar, PT   10/02/2019

## 2019-10-07 ENCOUNTER — CLINICAL SUPPORT (OUTPATIENT)
Dept: REHABILITATION | Facility: HOSPITAL | Age: 64
End: 2019-10-07
Attending: ORTHOPAEDIC SURGERY
Payer: MEDICARE

## 2019-10-07 DIAGNOSIS — M25.511 ACUTE PAIN OF RIGHT SHOULDER: ICD-10-CM

## 2019-10-07 DIAGNOSIS — Z98.890 STATUS POST SHOULDER SURGERY: ICD-10-CM

## 2019-10-07 DIAGNOSIS — M25.611 DECREASED RIGHT SHOULDER RANGE OF MOTION: ICD-10-CM

## 2019-10-07 DIAGNOSIS — R29.898 WEAKNESS OF SHOULDER: ICD-10-CM

## 2019-10-07 PROCEDURE — 97110 THERAPEUTIC EXERCISES: CPT | Mod: PN

## 2019-10-07 NOTE — PROGRESS NOTES
Physical Therapy Daily Treatment Note     Name: Lorena Rodriguez  Clinic Number: [id number]    Therapy Diagnosis:   Encounter Diagnoses   Name Primary?    Status post shoulder surgery     Weakness of shoulder     Decreased right shoulder range of motion     Acute pain of right shoulder      Physician: Nj You MD    Visit Date: 10/7/2019    Physician Orders: PT Eval and Treat   Medical Diagnosis from Referral: Right shoulder injury  Evaluation Date: 8/30/2019  Authorization Period Expiration:12/31/2019  Plan of Care Expiration: 12/31/2019  Visit # / Visits authorized:11/ 20 PN Due: 10/23/2019    DOS: 8/20/2019   POW:~ 7     Return to M.D: about 10:30-19    Time In: 10:00  AM  Time Out: 11:00  aM  Total Billable Time: 30 minutes    Precautions: s/p R RTC surgery Medium to large tears ( greater than 1 cm, less than 5 cm)     Subjective     Pt reports: that he is feeling much better. Pt reports he is having less R arm pain. Pt states he ambulates with sling while walking in public.   He was compliant with home exercise program.  Response to previous treatment: Increase soreness   Functional change: No change     Pain: 0/10  Location: right shoulder      Objective      Updated: 9/11/2019    PROM R shoulder:  Flexion 180  deg   ER in scapular plane: 80 deg   IR in scapular planes 90 deg    CMS Impairment/Limitation/Restriction for FOTO Shoulder Survey  Status Limitation G-Code CMS Severity Modifier  Intake 32% 68%  Predicted 61% 39% Goal Status+ CJ - At least 20 percent but less than 40 percent  9/23/2019 50% 50% Current Status CK - At least 40 percent but less than 60 percent  D/C Status CK **only report if this is discharge survey    Lorena received therapeutic exercises to develop strength, endurance, ROM and flexibility for 45 minutes including:    Pendulum flexion 2 min  Pulleys flexion in scapular planes 3min AAROM  Seated scapular retraction 3x10  Seated upper traps stretch  3x30 sec   Submaximal pain free  isometrics with elbow bend: Flexion/Abd/ ER/IR/ elbow flexors 3x10   Wall walk shoulder flexion 3x10   Supine AAROM wand ER/IR in scapular plane  x 30  Supine AAROM white wand shoulder flexion in scapular plane 3x10  Prone rows to neutral arm position 3x10  Prone horizontal abd 3x10   Standing biceps curls 3x10   SL Shoulder ER 3x10   Standing shoulder ER YTB 3x10     NP:  Hand gripping in sling 3x10  Supine Wrist flexion and extension  3x10  Wrist pronation and supination in sling  3x10      Ali received the following manual therapy techniques: PROM were applied to the: R shoulder  for 8 minutes, including:  PROM flexion to tolerance ( 180 deg)  PROM ER in scapular plane ( 80 deg)   IR in scapular plane (90deg)       Ali received cold pack for 10 minutes to R shoulder .       Home Exercises and Patient Education Provided     Education provided:   - Use proper sling   -Perform exercises 2xper day  -Follow PT and surgeon instructions      Written Home Exercises Provided: Patient instructed to cont prior HEP.  Exercises were reviewed and Lorena was able to demonstrate them prior to the end of the session.  Lorena demonstrated good  understanding of the education provided.      See EMR under Patient Instructions for exercises provided 8/30/2019.    Assessment     Pt tolerated therapy well today with no provocation of  of R shoulder pain. Pt is  7 weeks s/p  s/p R RTC surgery Medium to large tear. Pt has been tolerating. Pt tolerated progression of therapeutic exercises well today with minor R arm pain. Pt was able to perform full PROM of R shoulder. AAROM also demonstrated full R shoulder flexion, ER, and IR.  Pt was introduced to prone exercises today and side lying exercises to perform initial strength of RTC. Exercises were performed with light resistance today. Pt ambulated out of the clinic with R shoulder pain free today. Cont skilled PT services according to protocol.     Lorena is progressing well towards his goals.    Pt prognosis is Good.     Pt will continue to benefit from skilled outpatient physical therapy to address the deficits listed in the problem list box on initial evaluation, provide pt/family education and to maximize pt's level of independence in the home and community environment.     Pt's spiritual, cultural and educational needs considered and pt agreeable to plan of care and goals.    Anticipated barriers to physical therapy: None    GOALS: Short Term Goals: 6weeks  1.Report decreased in pain at worse less than  <   / =  5  /10  to increase tolerance for functional mobility. MET 9-   2. Pt to improve R shoulder passive range of motion (PROM) flexion to  180 deg to allow for improved functional mobility to allow for improvement in IADLs. MET 9-  3. Pt to report be conscious of impaired sitting and standing posture daily to decrease pain. MET 9-  4. Pt to tolerate HEP to improve ROM and independence with ADL's. MET 9-     Long Term Goals: 8-12  weeks  1.Report decreased in pain at worse less than  <   / =  2/10  to increase tolerance for functional mobility. On going  2.  Patient will demonstrate improved overall function per FOTO Survey to CJ = at least 20% but < 40% impaired, limited or restricted score or less.On going  3.Increased R shoulder  MMT 1 grade to increase tolerance for ADL and work activities.On going  4. Pt to report and demonstrate proper posture in standing and sitting to decrease pain. On going  5. Pt to be Independent with HEP to improve ROM and independence with ADL's.On going  6. Pt to improve R shoulder active  range of motion (AROM) flexion  to 180 deg to allow for improved functional mobility to allow for improvement in IADLs. On going    Plan     Certification date: Plan of care Certification: 8/30/2019 to 12/31/2019  Cont skilled PT session towards PT and patient's goals.    Magdy Tovar, PT   10/07/2019

## 2019-10-09 ENCOUNTER — CLINICAL SUPPORT (OUTPATIENT)
Dept: REHABILITATION | Facility: HOSPITAL | Age: 64
End: 2019-10-09
Attending: ORTHOPAEDIC SURGERY
Payer: MEDICARE

## 2019-10-09 DIAGNOSIS — M25.511 ACUTE PAIN OF RIGHT SHOULDER: ICD-10-CM

## 2019-10-09 DIAGNOSIS — R29.898 WEAKNESS OF SHOULDER: ICD-10-CM

## 2019-10-09 DIAGNOSIS — M25.611 DECREASED RIGHT SHOULDER RANGE OF MOTION: ICD-10-CM

## 2019-10-09 DIAGNOSIS — Z98.890 STATUS POST SHOULDER SURGERY: ICD-10-CM

## 2019-10-09 PROCEDURE — 97110 THERAPEUTIC EXERCISES: CPT | Mod: PN

## 2019-10-09 NOTE — PROGRESS NOTES
Physical Therapy Daily Treatment Note     Name: Lorena Rodriguez  Clinic Number: [id number]    Therapy Diagnosis:   Encounter Diagnoses   Name Primary?    Status post shoulder surgery     Weakness of shoulder     Decreased right shoulder range of motion     Acute pain of right shoulder      Physician: Nj You MD    Visit Date: 10/9/2019    Physician Orders: PT Eval and Treat   Medical Diagnosis from Referral: Right shoulder injury  Evaluation Date: 8/30/2019  Authorization Period Expiration:12/31/2019  Plan of Care Expiration: 12/31/2019  Visit # / Visits authorized:12/ 20 PN Due: 10/23/2019    DOS: 8/20/2019   POW:~ 7     Return to M.D: about 10:30-19    Time In: 1030  Time Out: 1130  Total Billable Time: 50 minutes    Precautions: s/p R RTC surgery Medium to large tears ( greater than 1 cm, less than 5 cm)     Subjective     Pt reports: his shoulder has been feeling good.   He was compliant with home exercise program.  Response to previous treatment: good  Functional change: increased shoulder range    Pain: 0/10  Location: right shoulder      Objective      Updated: 9/11/2019  PROM R shoulder:  Flexion 180  deg   ER in scapular plane: 80 deg   IR in scapular planes 90 deg    Lorena received therapeutic exercises to develop strength, endurance, ROM and flexibility for 50 minutes including:    Pendulum flexion 2 min  Pulleys flexion in scapular planes 3 min AAROM  Seated scapular retraction 3x10  Seated upper traps stretch 3x30 sec   Submaximal pain free isometrics with elbow bend: Flexion/Abd/ ER/IR/ elbow flexors 3x10   Wall walk shoulder flexion 3x10   Supine AAROM wand ER/IR in scapular plane x 30  Supine w HOB elevated 2 notches AAROM white wand shoulder flexion in scapular plane 3x10  Prone rows to neutral arm position 3x10  Prone horizontal abd 3x10   Standing biceps curls 1# 3x10   SL Shoulder ER 3x10   Standing shoulder ER red TB 2 x 10     NP:  Hand gripping in sling 3x10  Supine Wrist flexion and  extension  3x10  Wrist pronation and supination in sling  3x10    Lorena received the following manual therapy techniques: PROM were applied to the: R shoulder for 0 minutes, including:  PROM flexion to tolerance (180 deg)  PROM ER in scapular plane (80 deg)   IR in scapular plane (90deg)     Ali received cold pack for 10 minutes to R shoulder.     Home Exercises and Patient Education Provided     Education provided:   - Use proper sling   -Perform exercises 2xper day  -Follow PT and surgeon instructions      Written Home Exercises Provided: Patient instructed to cont prior HEP.  Exercises were reviewed and Lorena was able to demonstrate them prior to the end of the session.  Lorena demonstrated good  understanding of the education provided.      See EMR under Patient Instructions for exercises provided 8/30/2019.    Assessment     Pt had good tolerance to treatment today with no adverse effects. Post-treatment R shoulder pain rated as 0/10. Pt with no exacerbation of symptoms throughout session. Good response to progression of exercise program with addition of weight. He was challenged with resisted ER reporting muscle fatigue. Full shoulder AROM noted. He demonstrates good independence with exercise program indicate good compliance with HEP.     Lorena is progressing well towards his goals.   Pt prognosis is Good.     Pt will continue to benefit from skilled outpatient physical therapy to address the deficits listed in the problem list box on initial evaluation, provide pt/family education and to maximize pt's level of independence in the home and community environment.     Pt's spiritual, cultural and educational needs considered and pt agreeable to plan of care and goals.    Anticipated barriers to physical therapy: None    GOALS: Short Term Goals: 6weeks  1.Report decreased in pain at worse less than  <   / =  5  /10  to increase tolerance for functional mobility. MET 9-   2. Pt to improve R shoulder passive range  of motion (PROM) flexion to  180 deg to allow for improved functional mobility to allow for improvement in IADLs. MET 9-  3. Pt to report be conscious of impaired sitting and standing posture daily to decrease pain. MET 9-  4. Pt to tolerate HEP to improve ROM and independence with ADL's. MET 9-     Long Term Goals: 8-12  weeks  1.Report decreased in pain at worse less than  <   / =  2/10  to increase tolerance for functional mobility. On going  2.  Patient will demonstrate improved overall function per FOTO Survey to CJ = at least 20% but < 40% impaired, limited or restricted score or less.On going  3.Increased R shoulder  MMT 1 grade to increase tolerance for ADL and work activities.On going  4. Pt to report and demonstrate proper posture in standing and sitting to decrease pain. On going  5. Pt to be Independent with HEP to improve ROM and independence with ADL's.On going  6. Pt to improve R shoulder active  range of motion (AROM) flexion  to 180 deg to allow for improved functional mobility to allow for improvement in IADLs. On going    Plan     Certification date: Plan of care Certification: 8/30/2019 to 12/31/2019    Progress UE strengthening per protocol.     Zita Cavanaugh, PT   10/09/2019

## 2019-10-09 NOTE — PROGRESS NOTES
Physical Therapy Daily Treatment Note     Name: Lorena Rodriguez  Clinic Number: [id number]    Therapy Diagnosis:   No diagnosis found.  Physician: Nj You MD    Visit Date: 10/9/2019    Physician Orders: PT Eval and Treat   Medical Diagnosis from Referral: Right shoulder injury  Evaluation Date: 8/30/2019  Authorization Period Expiration:12/31/2019  Plan of Care Expiration: 12/31/2019  Visit # / Visits authorized:11/ 20 PN Due: 10/23/2019    DOS: 8/20/2019   POW:~ 7     Return to M.D: about 10:30-19    Time In: 10:00  AM  Time Out: 11:00  aM  Total Billable Time: 30 minutes    Precautions: s/p R RTC surgery Medium to large tears ( greater than 1 cm, less than 5 cm)     Subjective     Pt reports: that he is feeling much better. Pt reports he is having less R arm pain. Pt states he ambulates with sling while walking in public.   He was compliant with home exercise program.  Response to previous treatment: Increase soreness   Functional change: No change     Pain: 0/10  Location: right shoulder      Objective      Updated: 9/11/2019    PROM R shoulder:  Flexion 180  deg   ER in scapular plane: 80 deg   IR in scapular planes 90 deg    CMS Impairment/Limitation/Restriction for FOTO Shoulder Survey  Status Limitation G-Code CMS Severity Modifier  Intake 32% 68%  Predicted 61% 39% Goal Status+ CJ - At least 20 percent but less than 40 percent  9/23/2019 50% 50% Current Status CK - At least 40 percent but less than 60 percent  D/C Status CK **only report if this is discharge survey    Lorena received therapeutic exercises to develop strength, endurance, ROM and flexibility for 45 minutes including:    Pendulum flexion 2 min  Pulleys flexion in scapular planes 3min AAROM  Seated scapular retraction 3x10  Seated upper traps stretch  3x30 sec   Submaximal pain free isometrics with elbow bend: Flexion/Abd/ ER/IR/ elbow flexors 3x10   Wall walk shoulder flexion 3x10   Supine AAROM wand ER/IR in scapular plane  x 30  Supine  AAROM white wand shoulder flexion in scapular plane 3x10  Prone rows to neutral arm position 3x10  Prone horizontal abd 3x10   Standing biceps curls 3x10   SL Shoulder ER 3x10   Standing shoulder ER YTB 3x10     NP:  Hand gripping in sling 3x10  Supine Wrist flexion and extension  3x10  Wrist pronation and supination in sling  3x10      Lorena received the following manual therapy techniques: PROM were applied to the: R shoulder  for 8 minutes, including:  PROM flexion to tolerance ( 180 deg)  PROM ER in scapular plane ( 80 deg)   IR in scapular plane (90deg)       Ali received cold pack for 10 minutes to R shoulder .       Home Exercises and Patient Education Provided     Education provided:   - Use proper sling   -Perform exercises 2xper day  -Follow PT and surgeon instructions      Written Home Exercises Provided: Patient instructed to cont prior HEP.  Exercises were reviewed and Lorena was able to demonstrate them prior to the end of the session.  Lorena demonstrated good  understanding of the education provided.      See EMR under Patient Instructions for exercises provided 8/30/2019.    Assessment     Pt tolerated therapy well today with no provocation of  of R shoulder pain. Pt is  7 weeks s/p  s/p R RTC surgery Medium to large tear. Pt has been tolerating. Pt tolerated progression of therapeutic exercises well today with minor R arm pain. Pt was able to perform full PROM of R shoulder. PATRICIA also demonstrated full R shoulder flexion, ER, and IR.  Pt was introduced to prone exercises today and side lying exercises to perform initial strength of RTC. Exercises were performed with light resistance today. Pt ambulated out of the clinic with R shoulder pain free today. Cont skilled PT services according to protocol.     Lorena is progressing well towards his goals.   Pt prognosis is Good.     Pt will continue to benefit from skilled outpatient physical therapy to address the deficits listed in the problem list box on initial  evaluation, provide pt/family education and to maximize pt's level of independence in the home and community environment.     Pt's spiritual, cultural and educational needs considered and pt agreeable to plan of care and goals.    Anticipated barriers to physical therapy: None    GOALS: Short Term Goals: 6weeks  1.Report decreased in pain at worse less than  <   / =  5  /10  to increase tolerance for functional mobility. MET 9-   2. Pt to improve R shoulder passive range of motion (PROM) flexion to  180 deg to allow for improved functional mobility to allow for improvement in IADLs. MET 9-  3. Pt to report be conscious of impaired sitting and standing posture daily to decrease pain. MET 9-  4. Pt to tolerate HEP to improve ROM and independence with ADL's. MET 9-     Long Term Goals: 8-12  weeks  1.Report decreased in pain at worse less than  <   / =  2/10  to increase tolerance for functional mobility. On going  2.  Patient will demonstrate improved overall function per FOTO Survey to CJ = at least 20% but < 40% impaired, limited or restricted score or less.On going  3.Increased R shoulder  MMT 1 grade to increase tolerance for ADL and work activities.On going  4. Pt to report and demonstrate proper posture in standing and sitting to decrease pain. On going  5. Pt to be Independent with HEP to improve ROM and independence with ADL's.On going  6. Pt to improve R shoulder active  range of motion (AROM) flexion  to 180 deg to allow for improved functional mobility to allow for improvement in IADLs. On going    Plan     Certification date: Plan of care Certification: 8/30/2019 to 12/31/2019  Cont skilled PT session towards PT and patient's goals.    Mgady Tovar, PT   10/09/2019

## 2019-10-14 ENCOUNTER — CLINICAL SUPPORT (OUTPATIENT)
Dept: REHABILITATION | Facility: HOSPITAL | Age: 64
End: 2019-10-14
Attending: ORTHOPAEDIC SURGERY
Payer: MEDICARE

## 2019-10-14 DIAGNOSIS — R29.898 WEAKNESS OF SHOULDER: ICD-10-CM

## 2019-10-14 DIAGNOSIS — M25.611 DECREASED RIGHT SHOULDER RANGE OF MOTION: ICD-10-CM

## 2019-10-14 DIAGNOSIS — M25.511 ACUTE PAIN OF RIGHT SHOULDER: ICD-10-CM

## 2019-10-14 DIAGNOSIS — Z98.890 STATUS POST SHOULDER SURGERY: ICD-10-CM

## 2019-10-14 PROCEDURE — 97110 THERAPEUTIC EXERCISES: CPT | Mod: PN

## 2019-10-14 NOTE — PROGRESS NOTES
Physical Therapy Daily Treatment Note     Name: Lorena Rodriguez  Clinic Number: [id number]    Therapy Diagnosis:   Encounter Diagnoses   Name Primary?    Status post shoulder surgery     Weakness of shoulder     Decreased right shoulder range of motion     Acute pain of right shoulder      Physician: Nj You MD    Visit Date: 10/14/2019    Physician Orders: PT Eval and Treat   Medical Diagnosis from Referral: Right shoulder injury  Evaluation Date: 8/30/2019  Authorization Period Expiration:12/31/2019  Plan of Care Expiration: 12/31/2019  Visit # / Visits authorized:13/ 20 PN Due: 10/23/2019    DOS: 8/20/2019   POW:~ 8     Return to M.D: about 11-    Time In: 1030  Time Out: 1130  Total Billable Time: 30 minutes    Precautions: s/p R RTC surgery Medium to large tears ( greater than 1 cm, less than 5 cm)     Subjective     Pt reports: his shoulder has been feeling good ,but he has some lateral arm pain.   He was compliant with home exercise program.  Response to previous treatment: good  Functional change: increased shoulder range    Pain: 0/10  Location: right shoulder      Objective      Updated: 9/11/2019  PROM R shoulder:  Flexion 180  deg   ER in scapular plane: 80 deg   IR in scapular planes 90 deg    Lorena received therapeutic exercises to develop strength, endurance, ROM and flexibility for 50 minutes including:    Pendulum flexion 2 min  Pulleys flexion in scapular planes 3 min AAROM  Pulleys shoulder abd in scapular planes 3 min AAROM  Seated upper traps stretch 3x30 sec   Submaximal pain free isometrics with elbow bend: Flexion/Abd/ ER/IR/ elbow flexors 3x10   Wall walk shoulder flexion 3x10   Supine AAROM wand ER/IR in scapular plane x 30  Supine w HOB elevated 2 notches AAROM white wand shoulder flexion in scapular plane 3x10  Prone rows to neutral arm position 3x10  Prone horizontal abd 3x10   Standing biceps curls 1# 3x10   SL Shoulder ER 3x10   Standing shoulder ER red TB 2 x 10    Standing scapular retraction YTB 3x10     NP:  Hand gripping in sling 3x10  Supine Wrist flexion and extension  3x10  Wrist pronation and supination in sling  3x10    Lorena received the following manual therapy techniques: PROM were applied to the: R shoulder for 5 minutes, including:  STM lateral R arm     NP  PROM flexion to tolerance (180 deg)  PROM ER in scapular plane (80 deg)   IR in scapular plane (90deg)     Ali received cold pack for 10 minutes to R shoulder.     Home Exercises and Patient Education Provided     Education provided:   - Use proper sling   -Perform exercises 2xper day  -Follow PT and surgeon instructions      Written Home Exercises Provided: Patient instructed to cont prior HEP.  Exercises were reviewed and Lorena was able to demonstrate them prior to the end of the session.  Lorena demonstrated good  understanding of the education provided.      See EMR under Patient Instructions for exercises provided 8/30/2019.    Assessment     Pt had good tolerance to treatment today with no adverse effects. Pt is 7 weeks  Pt responded well to therapy today. Pt cont improve R RTC muscle strength and AAROM. Pt has increase R lateral arm pain. Pt left therapy with no R lateral arm pain today. V/c's required to perform exercises with proper form and proper muscle activation. Pt is ready to be progressed next visits  according to protocol.     Lorena is progressing well towards his goals.   Pt prognosis is Good.     Pt will continue to benefit from skilled outpatient physical therapy to address the deficits listed in the problem list box on initial evaluation, provide pt/family education and to maximize pt's level of independence in the home and community environment.     Pt's spiritual, cultural and educational needs considered and pt agreeable to plan of care and goals.    Anticipated barriers to physical therapy: None    GOALS: Short Term Goals: 6weeks  1.Report decreased in pain at worse less than  <   / =  5   /10  to increase tolerance for functional mobility. MET 9-   2. Pt to improve R shoulder passive range of motion (PROM) flexion to  180 deg to allow for improved functional mobility to allow for improvement in IADLs. MET 9-  3. Pt to report be conscious of impaired sitting and standing posture daily to decrease pain. MET 9-  4. Pt to tolerate HEP to improve ROM and independence with ADL's. MET 9-     Long Term Goals: 8-12  weeks  1.Report decreased in pain at worse less than  <   / =  2/10  to increase tolerance for functional mobility. On going  2.  Patient will demonstrate improved overall function per FOTO Survey to CJ = at least 20% but < 40% impaired, limited or restricted score or less.On going  3.Increased R shoulder  MMT 1 grade to increase tolerance for ADL and work activities.On going  4. Pt to report and demonstrate proper posture in standing and sitting to decrease pain. On going  5. Pt to be Independent with HEP to improve ROM and independence with ADL's.On going  6. Pt to improve R shoulder active  range of motion (AROM) flexion  to 180 deg to allow for improved functional mobility to allow for improvement in IADLs. On going    Plan     Certification date: Plan of care Certification: 8/30/2019 to 12/31/2019    Progress UE strengthening per protocol.     Magdy Tovar, PT   10/14/2019

## 2019-10-16 ENCOUNTER — CLINICAL SUPPORT (OUTPATIENT)
Dept: REHABILITATION | Facility: HOSPITAL | Age: 64
End: 2019-10-16
Attending: ORTHOPAEDIC SURGERY
Payer: MEDICARE

## 2019-10-16 DIAGNOSIS — R29.898 WEAKNESS OF SHOULDER: ICD-10-CM

## 2019-10-16 DIAGNOSIS — M25.611 DECREASED RIGHT SHOULDER RANGE OF MOTION: ICD-10-CM

## 2019-10-16 DIAGNOSIS — M25.511 ACUTE PAIN OF RIGHT SHOULDER: ICD-10-CM

## 2019-10-16 DIAGNOSIS — Z98.890 STATUS POST SHOULDER SURGERY: ICD-10-CM

## 2019-10-16 PROCEDURE — 97110 THERAPEUTIC EXERCISES: CPT | Mod: PN

## 2019-10-16 NOTE — PROGRESS NOTES
Physical Therapy Daily Treatment Note     Name: Lorena Rodriguez  Clinic Number: [id number]    Therapy Diagnosis:   No diagnosis found.  Physician: Nj You MD    Visit Date: 10/16/2019    Physician Orders: PT Eval and Treat   Medical Diagnosis from Referral: Right shoulder injury  Evaluation Date: 8/30/2019  Authorization Period Expiration:12/31/2019  Plan of Care Expiration: 12/31/2019  Visit # / Visits authorized:13/ 20 PN Due: 10/23/2019    DOS: 8/20/2019   POW:8     Return to M.D: about 11-    Time In: 10:30 am  Time Out: 11:30 am  Total Billable Time: 30 minutes    Precautions: s/p R RTC surgery Medium to large tears ( greater than 1 cm, less than 5 cm)     Subjective     Pt reports: that he cont to feel lateral R arm pain. Pt denies pain at surgical site.   He was compliant with home exercise program.  Response to previous treatment: good  Functional change: increased shoulder range    Pain: 0/10  Location: right shoulder      Objective      Updated: 10-16-/2019  PROM R shoulder:  Flexion 180  deg   ER in scapular plane: 80 deg   IR in scapular planes 90 deg    Lorena received therapeutic exercises to develop strength, endurance, ROM and flexibility for 55 minutes including:    UBE 6 min forwards   Pulleys flexion in scapular planes 3 min AAROM  Pulleys shoulder abd in scapular planes 3 min AAROM  Supine shoulder press Red wand 3x10   Wall walk shoulder flexion 3x10   Supine AAROM wand ER/IR in scapular plane x 30  Supine w HOB elevated 2 notches AAROM Red wand shoulder flexion in scapular plane 3x10  Prone rows to neutral arm position 3x10  Prone horizontal abd 3x10   Prone shoulder extension 3x10   Standing biceps curls 2# 3x10   SL Shoulder ER 3x10   Standing shoulder ER red TB 3 x 10   Standing shoulder IR RTB 3x10  Standing scapular retraction YTB 3x10   Standing lateral raise without band 1x10    NP:  Hand gripping in sling 3x10  Supine Wrist flexion and extension  3x10  Wrist pronation and  supination in sling  3x10  Pendulum flexion 2 min  Seated upper traps stretch 3x30 sec   Submaximal pain free isometrics with elbow bend: Flexion/Abd/ ER/IR/ elbow flexors 3x10     Ali received the following manual therapy techniques: PROM were applied to the: R shoulder for 5 minutes, including:  STM lateral R arm     NP  PROM flexion to tolerance (180 deg)  PROM ER in scapular plane (80 deg)   IR in scapular plane (90deg)     Ali received cold pack for 10 minutes to R shoulder.     Home Exercises and Patient Education Provided     Education provided:   - Use proper sling   -Perform exercises 2xper day  -Follow PT and surgeon instructions      Written Home Exercises Provided: Patient instructed to cont prior HEP.  Exercises were reviewed and Lorena was able to demonstrate them prior to the end of the session.  Ali demonstrated good  understanding of the education provided.      See EMR under Patient Instructions for exercises provided 8/30/2019.    Assessment     Pt had good tolerance to treatment today with no adverse effects. Pt is 8 weeks surgical. Pt tolerated well progression of R RTC and gabby-scapular strengthening according to protocol. Pt did not presented with any provocation of R shoulder pain during therapy, but he cont with lateral arm pain. Pt cont with full PROM and AAROM R shoulder range of motion in all planes. Mod v/c's required to perform exercises with proper form. Pt cont with R shoulder weakness, but has been improving well. Pt is ready to be progressed next visits  according to protocol.     Ali is progressing well towards his goals.   Pt prognosis is Good.     Pt will continue to benefit from skilled outpatient physical therapy to address the deficits listed in the problem list box on initial evaluation, provide pt/family education and to maximize pt's level of independence in the home and community environment.     Pt's spiritual, cultural and educational needs considered and pt agreeable to  plan of care and goals.    Anticipated barriers to physical therapy: None    GOALS: Short Term Goals: 6weeks  1.Report decreased in pain at worse less than  <   / =  5  /10  to increase tolerance for functional mobility. MET 9-   2. Pt to improve R shoulder passive range of motion (PROM) flexion to  180 deg to allow for improved functional mobility to allow for improvement in IADLs. MET 9-  3. Pt to report be conscious of impaired sitting and standing posture daily to decrease pain. MET 9-  4. Pt to tolerate HEP to improve ROM and independence with ADL's. MET 9-     Long Term Goals: 8-12  weeks  1.Report decreased in pain at worse less than  <   / =  2/10  to increase tolerance for functional mobility. On going  2.  Patient will demonstrate improved overall function per FOTO Survey to CJ = at least 20% but < 40% impaired, limited or restricted score or less.On going  3.Increased R shoulder  MMT 1 grade to increase tolerance for ADL and work activities.On going  4. Pt to report and demonstrate proper posture in standing and sitting to decrease pain. On going  5. Pt to be Independent with HEP to improve ROM and independence with ADL's.On going  6. Pt to improve R shoulder active  range of motion (AROM) flexion  to 180 deg to allow for improved functional mobility to allow for improvement in IADLs. On going    Plan     Certification date: Plan of care Certification: 8/30/2019 to 12/31/2019    Progress UE strengthening per protocol.     Magdy Tovar, PT   10/16/2019

## 2019-10-18 NOTE — PROGRESS NOTES
Physical Therapy Daily Treatment Note     Name: Lorena Rodriguez  Clinic Number: [id number]    Therapy Diagnosis:   No diagnosis found.  Physician: Nj You MD    Visit Date: 10/21/2019    Physician Orders: PT Eval and Treat   Medical Diagnosis from Referral: Right shoulder injury  Evaluation Date: 8/30/2019  Authorization Period Expiration:12/31/2019  Plan of Care Expiration: 12/31/2019  Visit # / Visits authorized:14/ 20 PN Due: 10/21/019    DOS: 8/20/2019   POW:8     Return to M.D: about 11-    Time In: 10:20 am  Time Out: 11:20am  Total Billable Time: 40 minutes    Precautions: s/p R RTC surgery Medium to large tears ( greater than 1 cm, less than 5 cm)     Subjective     Pt reports: that he has minimal R lateral arm pain today. He states he did not have any R shoulder issues over the weekend.   He was compliant with home exercise program.  Response to previous treatment: good  Functional change: increased shoulder range    Pain: 0/10  Location: right shoulder      Objective      CMS Impairment/Limitation/Restriction for FOTO Shoulder Survey  Status Limitation G-Code CMS Severity Modifier  Intake 32% 68%  Predicted 61% 39% Goal Status+ CJ - At least 20 percent but less than 40 percent  9/23/2019 50% 50%  10/21/2019 68% 32% Current Status CJ - At least 20 percent but less than 40 percent  D/C Status CJ **only report if this is discharge survey    Updated: 10-21-/2019  PROM R shoulder:  Flexion 180  deg   ER in scapular plane: 80 deg   IR in scapular planes 90 deg    AROM R shoulder:  Flexion:170 deg  ABD: 170 deg   ER (supine): 70 deg  IR (supine): 80 deg     Upper Extremity Strength  (R) UE     Elbow flexion: 4-/5   Elbow extension: 4-/5    Shoulder elevation: 4-/5    Shoulder flexion: 4-/5    Shoulder Abduction: 4-/5   Shoulder ER 4-/5    Shoulder IR 4-/5          Lorena received therapeutic exercises to develop strength, endurance, ROM and flexibility for 55 minutes including:    UBE 6 min forwards    Pulleys flexion in scapular planes 3 min AAROM  Pulleys shoulder abd in scapular planes 3 min AAROM  Supine shoulder press Red wand 3x10   Wall walk shoulder flexion 3x10   Supine AAROM wand ER/IR in scapular plane x 30  Supine w HOB elevated 2 notches AAROM Red wand shoulder flexion in scapular plane 3x10  Prone rows to neutral arm position 3x10  Prone horizontal abd 3x10   Prone shoulder extension 3x10   Standing biceps curls 2# 3x10   SL Shoulder ER 3x10   Standing shoulder ER red TB 3 x 10   Standing shoulder IR RTB 3x10  Standing scapular retraction YTB 3x10   Standing lateral raise without band 1x10    NP:  Hand gripping in sling 3x10  Supine Wrist flexion and extension  3x10  Wrist pronation and supination in sling  3x10  Pendulum flexion 2 min  Seated upper traps stretch 3x30 sec   Submaximal pain free isometrics with elbow bend: Flexion/Abd/ ER/IR/ elbow flexors 3x10     Ali received the following manual therapy techniques: PROM were applied to the: R shoulder for 5 minutes, including:  STM lateral R arm     NP  PROM flexion to tolerance (180 deg)  PROM ER in scapular plane (80 deg)   IR in scapular plane (90deg)     Ali received cold pack for 10 minutes to R shoulder.     Home Exercises and Patient Education Provided     Education provided:   - Use proper sling   -Perform exercises 2xper day  -Follow PT and surgeon instructions      Written Home Exercises Provided: Patient instructed to cont prior HEP.  Exercises were reviewed and Ali was able to demonstrate them prior to the end of the session.  Ali demonstrated good  understanding of the education provided.      See EMR under Patient Instructions for exercises provided 8/30/2019.    Assessment     Pt had good tolerance to treatment today with no adverse effects. Pt is 8 weeks surgical. Pt was re-assessed today. He has increase R shoulder AROM in flexion (170 deg), abd (170 deg), ER (70 deg), and IR (80 deg). Pt demonstrated improvement of R shoulder  MMT during flexion, abd, ER, and IR to 4-/5  Today. Pt has decrease R shoulder pain. Pt has met 5/6 LTGs. Overall, pt cont to improve R shoulder AROM and muscle strength. Pt cont with RTC and periscapular weakness.  Plan to cont therapy according to protocol to strengthening R RTC and periscapular.     Lorena is progressing well towards his goals.   Pt prognosis is Good.     Pt will continue to benefit from skilled outpatient physical therapy to address the deficits listed in the problem list box on initial evaluation, provide pt/family education and to maximize pt's level of independence in the home and community environment.     Pt's spiritual, cultural and educational needs considered and pt agreeable to plan of care and goals.    Anticipated barriers to physical therapy: None    GOALS: Short Term Goals: 6weeks  1.Report decreased in pain at worse less than  <   / =  5  /10  to increase tolerance for functional mobility. MET 9-   2. Pt to improve R shoulder passive range of motion (PROM) flexion to  180 deg to allow for improved functional mobility to allow for improvement in IADLs. MET 9-  3. Pt to report be conscious of impaired sitting and standing posture daily to decrease pain. MET 9-  4. Pt to tolerate HEP to improve ROM and independence with ADL's. MET 9-     Long Term Goals: 8-12  weeks  1.Report decreased in pain at worse less than  <   / =  2/10  to increase tolerance for functional mobility. MET 10-  2.  Patient will demonstrate improved overall function per FOTO Survey to CJ = at least 20% but < 40% impaired, limited or restricted score or less.MET 10-  3.Increased R shoulder  MMT 1 grade to increase tolerance for ADL and work activities.MET 10-  4. Pt to report and demonstrate proper posture in standing and sitting to decrease pain. MET 10-  5. Pt to be Independent with HEP to improve ROM and independence with ADL's.MET 10-  6. Pt to  improve R shoulder active  range of motion (AROM) flexion  to 180 deg to allow for improved functional mobility to allow for improvement in IADLs. Not met 10-    Plan     Certification date: Plan of care Certification: 8/30/2019 to 12/31/2019    Progress UE strengthening per protocol.     Magdy Tovar, PT   10/18/2019

## 2019-10-21 ENCOUNTER — CLINICAL SUPPORT (OUTPATIENT)
Dept: REHABILITATION | Facility: HOSPITAL | Age: 64
End: 2019-10-21
Attending: ORTHOPAEDIC SURGERY
Payer: MEDICARE

## 2019-10-21 DIAGNOSIS — M25.511 ACUTE PAIN OF RIGHT SHOULDER: ICD-10-CM

## 2019-10-21 DIAGNOSIS — M25.611 DECREASED RIGHT SHOULDER RANGE OF MOTION: ICD-10-CM

## 2019-10-21 DIAGNOSIS — R29.898 WEAKNESS OF SHOULDER: ICD-10-CM

## 2019-10-21 DIAGNOSIS — Z98.890 STATUS POST SHOULDER SURGERY: ICD-10-CM

## 2019-10-21 PROCEDURE — 97110 THERAPEUTIC EXERCISES: CPT | Mod: PN

## 2019-10-23 ENCOUNTER — CLINICAL SUPPORT (OUTPATIENT)
Dept: REHABILITATION | Facility: HOSPITAL | Age: 64
End: 2019-10-23
Attending: ORTHOPAEDIC SURGERY
Payer: MEDICARE

## 2019-10-23 DIAGNOSIS — M25.611 DECREASED RIGHT SHOULDER RANGE OF MOTION: ICD-10-CM

## 2019-10-23 DIAGNOSIS — Z98.890 STATUS POST SHOULDER SURGERY: ICD-10-CM

## 2019-10-23 DIAGNOSIS — M25.511 ACUTE PAIN OF RIGHT SHOULDER: ICD-10-CM

## 2019-10-23 DIAGNOSIS — R29.898 WEAKNESS OF SHOULDER: ICD-10-CM

## 2019-10-23 PROCEDURE — 97110 THERAPEUTIC EXERCISES: CPT | Mod: PN

## 2019-10-23 NOTE — PROGRESS NOTES
Physical Therapy Daily Treatment Note     Name: Lorena Rodriguez  Clinic Number: [id number]    Therapy Diagnosis:   Encounter Diagnoses   Name Primary?    Status post shoulder surgery     Weakness of shoulder     Decreased right shoulder range of motion     Acute pain of right shoulder      Physician: Nj You MD    Visit Date: 10/23/2019    Physician Orders: PT Eval and Treat   Medical Diagnosis from Referral: Right shoulder injury  Evaluation Date: 8/30/2019  Authorization Period Expiration:12/31/2019  Plan of Care Expiration: 12/31/2019  Visit # / Visits authorized:16/ 20 PN Due: 11/23/019    DOS: 8/20/2019   POW:8     Return to M.D: about 11-    Time In: 10:30 am  Time Out: 11:30 am  Total Billable Time: 30 minutes    Precautions: s/p R RTC surgery Medium to large tears ( greater than 1 cm, less than 5 cm)     Subjective     Pt reports: that he has minimal R lateral arm pain today.   He was compliant with home exercise program.  Response to previous treatment: good  Functional change: increased shoulder range    Pain: 0/10  Location: right shoulder      Objective      CMS Impairment/Limitation/Restriction for FOTO Shoulder Survey  Status Limitation G-Code CMS Severity Modifier  Intake 32% 68%  Predicted 61% 39% Goal Status+ CJ - At least 20 percent but less than 40 percent  9/23/2019 50% 50%  10/21/2019 68% 32% Current Status CJ - At least 20 percent but less than 40 percent  D/C Status CJ **only report if this is discharge survey    Updated: 10-21-/2019  PROM R shoulder:  Flexion 180  deg   ER in scapular plane: 80 deg   IR in scapular planes 90 deg    AROM R shoulder:  Flexion:170 deg  ABD: 170 deg   ER (supine): 70 deg  IR (supine): 80 deg       Lorena received therapeutic exercises to develop strength, endurance, ROM and flexibility for 55 minutes including:    UBE 6 min forwards 1.5 resistance   Pulleys flexion in scapular planes 3 min AAROM  Pulleys shoulder abd in scapular planes 3 min  AAROM  Supine shoulder press Red wand 3x10   Wall walk shoulder flexion 3x10   Wall windshield 3x10 caused pain  Bear hug YTB 3x10   Supine AAROM wand ER/IR in scapular plane x 30  Supine w HOB elevated 2 notches AAROM Red wand shoulder flexion in scapular plane 3x10  Prone rows to neutral arm position 3x10 2#  Prone horizontal abd 3x10 2#  Prone shoulder extension 3x10 2#   Standing biceps curls 2# 3x10    SL Shoulder ER 3x10 1#  Standing shoulder ER red TB 3 x 10   Standing shoulder IR RTB 3x10  Standing scapular retraction RTB 3x10   Standing lateral raise without band 2x10    NP:  Hand gripping in sling 3x10  Supine Wrist flexion and extension  3x10  Wrist pronation and supination in sling  3x10  Pendulum flexion 2 min  Seated upper traps stretch 3x30 sec   Submaximal pain free isometrics with elbow bend: Flexion/Abd/ ER/IR/ elbow flexors 3x10     Ali received the following manual therapy techniques: PROM were applied to the: R shoulder for 5 minutes, including:  STM lateral R arm     NP  PROM flexion to tolerance (180 deg)  PROM ER in scapular plane (80 deg)   IR in scapular plane (90deg)     Ali received cold pack for 10 minutes to R shoulder.     Home Exercises and Patient Education Provided     Education provided:   - Use proper sling   -Perform exercises 2xper day  -Follow PT and surgeon instructions      Written Home Exercises Provided: Patient instructed to cont prior HEP.  Exercises were reviewed and Ali was able to demonstrate them prior to the end of the session.  Ali demonstrated good  understanding of the education provided.      See EMR under Patient Instructions for exercises provided 8/30/2019.    Assessment     Pt had good tolerance to treatment today with no adverse effects. Pt is 8 weeks surgical. Pt presented to therapy with minor lateral R shoulder pain. Pt did not want to cont therapy. PT encourage pt to cont with therapy dur to weakness of R RTC and gabby-scapular. Pt tolerated moderate  progression of exercises and weights today according to protocol Pt demonstrated provocation of R anterior and lateral shoulder pain today, but pain subsided in the end of therapy.  Plan to cont therapy according to protocol to strengthening R RTC and periscapular.     Lorena is progressing well towards his goals.   Pt prognosis is Good.     Pt will continue to benefit from skilled outpatient physical therapy to address the deficits listed in the problem list box on initial evaluation, provide pt/family education and to maximize pt's level of independence in the home and community environment.     Pt's spiritual, cultural and educational needs considered and pt agreeable to plan of care and goals.    Anticipated barriers to physical therapy: None    GOALS: Short Term Goals: 6weeks  1.Report decreased in pain at worse less than  <   / =  5  /10  to increase tolerance for functional mobility. MET 9-   2. Pt to improve R shoulder passive range of motion (PROM) flexion to  180 deg to allow for improved functional mobility to allow for improvement in IADLs. MET 9-  3. Pt to report be conscious of impaired sitting and standing posture daily to decrease pain. MET 9-  4. Pt to tolerate HEP to improve ROM and independence with ADL's. MET 9-     Long Term Goals: 8-12  weeks  1.Report decreased in pain at worse less than  <   / =  2/10  to increase tolerance for functional mobility. MET 10-  2.  Patient will demonstrate improved overall function per FOTO Survey to CJ = at least 20% but < 40% impaired, limited or restricted score or less.MET 10-  3.Increased R shoulder  MMT 1 grade to increase tolerance for ADL and work activities.MET 10-  4. Pt to report and demonstrate proper posture in standing and sitting to decrease pain. MET 10-  5. Pt to be Independent with HEP to improve ROM and independence with ADL's.MET 10-  6. Pt to improve R shoulder active  range of  motion (AROM) flexion  to 180 deg to allow for improved functional mobility to allow for improvement in IADLs. Not met 10-    Plan     Certification date: Plan of care Certification: 8/30/2019 to 12/31/2019    Progress UE strengthening per protocol.     Magdy Tovar, PT   10/23/2019

## 2019-10-28 ENCOUNTER — CLINICAL SUPPORT (OUTPATIENT)
Dept: REHABILITATION | Facility: HOSPITAL | Age: 64
End: 2019-10-28
Attending: ORTHOPAEDIC SURGERY
Payer: MEDICARE

## 2019-10-28 DIAGNOSIS — M25.511 ACUTE PAIN OF RIGHT SHOULDER: ICD-10-CM

## 2019-10-28 DIAGNOSIS — Z98.890 STATUS POST SHOULDER SURGERY: ICD-10-CM

## 2019-10-28 DIAGNOSIS — R29.898 WEAKNESS OF SHOULDER: ICD-10-CM

## 2019-10-28 DIAGNOSIS — M25.611 DECREASED RIGHT SHOULDER RANGE OF MOTION: ICD-10-CM

## 2019-10-28 PROCEDURE — 97110 THERAPEUTIC EXERCISES: CPT | Mod: PN

## 2019-10-28 NOTE — PROGRESS NOTES
Physical Therapy Daily Treatment Note     Name: Lorena Rodriugez  Clinic Number: [id number]    Therapy Diagnosis:   Encounter Diagnoses   Name Primary?    Status post shoulder surgery     Weakness of shoulder     Decreased right shoulder range of motion     Acute pain of right shoulder      Physician: Nj You MD    Visit Date: 10/28/2019    Physician Orders: PT Eval and Treat   Medical Diagnosis from Referral: Right shoulder injury  Evaluation Date: 8/30/2019  Authorization Period Expiration:12/31/2019  Plan of Care Expiration: 12/31/2019  Visit # / Visits authorized:17/ 20 PN Due: 11/23/019    DOS: 8/20/2019   POW:~9     Return to M.D: about 11-    Time In: 2:00 pm  Time Out: 3:00 pm  Total Billable Time: 30 minutes    Precautions: s/p R RTC surgery Medium to large tears ( greater than 1 cm, less than 5 cm)     Subjective     Pt reports: that he feel lateral R arm pain. Pt states pain has increase more over the weekend. Pt ask questions about  X-rays and MRI today. Pt states he has to leave therapy at 3:00 because he has to  his grandkids.   He was compliant with home exercise program.  Response to previous treatment: good  Functional change: increased shoulder range    Pain: 3/10  Location: right shoulder      Objective      CMS Impairment/Limitation/Restriction for FOTO Shoulder Survey  Status Limitation G-Code CMS Severity Modifier  Intake 32% 68%  Predicted 61% 39% Goal Status+ CJ - At least 20 percent but less than 40 percent  9/23/2019 50% 50%  10/21/2019 68% 32% Current Status CJ - At least 20 percent but less than 40 percent  D/C Status CJ **only report if this is discharge survey    Updated: 10-21-/2019  PROM R shoulder:  Flexion 180  deg   ER in scapular plane: 80 deg   IR in scapular planes 90 deg    AROM R shoulder:  Flexion:170 deg  ABD: 170 deg   ER (supine): 70 deg  IR (supine): 80 deg       Lorena received therapeutic exercises to develop strength, endurance, ROM and flexibility  for 55 minutes including:    UBE 6 min forwards 1.5 resistance   Pulleys flexion in scapular planes 3 min AAROM  Pulleys shoulder abd in scapular planes 3 min AAROM  Supine shoulder press Red wand 3x10   Wall walk shoulder flexion 3x10    Wall windshield 3x10 caused pain NP  Bear hug YTB 3x10   Supine AAROM wand ER/IR in scapular plane x 30  Supine w HOB elevated 2 notches AAROM Red wand shoulder flexion in scapular plane 3x10  Prone rows to neutral arm position 3x10 2#  Prone horizontal abd 3x10 2#  Prone shoulder extension 3x10 2#   Standing biceps curls 2# 3x10    SL Shoulder ER 3x10 1#  Standing shoulder ER red TB 3 x 10   Standing shoulder IR RTB 3x10  Standing scapular retraction RTB 3x10   Standing lateral raise without band 2x10    NP:  Hand gripping in sling 3x10  Supine Wrist flexion and extension  3x10  Wrist pronation and supination in sling  3x10  Pendulum flexion 2 min  Seated upper traps stretch 3x30 sec   Submaximal pain free isometrics with elbow bend: Flexion/Abd/ ER/IR/ elbow flexors 3x10     Ali received the following manual therapy techniques: PROM were applied to the: R shoulder for 5 minutes, including:  STM lateral R arm     NP  PROM flexion to tolerance (180 deg)  PROM ER in scapular plane (80 deg)   IR in scapular plane (90deg)     Ali received cold pack for 10 minutes to R shoulder.     Home Exercises and Patient Education Provided     Education provided:   - Use proper sling   -Perform exercises 2xper day  -Follow PT and surgeon instructions      Written Home Exercises Provided: Patient instructed to cont prior HEP.  Exercises were reviewed and Lorena was able to demonstrate them prior to the end of the session.  Ali demonstrated good  understanding of the education provided.      See EMR under Patient Instructions for exercises provided 8/30/2019.    Assessment     Pt had good tolerance to treatment today with no adverse effects. Pt is 9 weeks surgical. Pt cont with R arm pain . Pt was  educated to not perform any throwing activities with R shoulder. Pt had increase of R shoulder and arm over the weekend. Pt cont with provocation of pain during Wall windshield. Exercise was discontinued today. Pt tolerated lateral arm raise without increase in R shoulder pain. Pt cotn with weakness of gabby-scapular muscles and R RTC. Pt cont benefit from strengthening exercises to decrease R shoulder and arm pain. Plan to cont therapy according to protocol to strengthening R RTC and periscapular.     Ali is progressing well towards his goals.   Pt prognosis is Good.     Pt will continue to benefit from skilled outpatient physical therapy to address the deficits listed in the problem list box on initial evaluation, provide pt/family education and to maximize pt's level of independence in the home and community environment.     Pt's spiritual, cultural and educational needs considered and pt agreeable to plan of care and goals.    Anticipated barriers to physical therapy: None    GOALS: Short Term Goals: 6weeks  1.Report decreased in pain at worse less than  <   / =  5  /10  to increase tolerance for functional mobility. MET 9-   2. Pt to improve R shoulder passive range of motion (PROM) flexion to  180 deg to allow for improved functional mobility to allow for improvement in IADLs. MET 9-  3. Pt to report be conscious of impaired sitting and standing posture daily to decrease pain. MET 9-  4. Pt to tolerate HEP to improve ROM and independence with ADL's. MET 9-     Long Term Goals: 8-12  weeks  1.Report decreased in pain at worse less than  <   / =  2/10  to increase tolerance for functional mobility. MET 10-  2.  Patient will demonstrate improved overall function per FOTO Survey to CJ = at least 20% but < 40% impaired, limited or restricted score or less.MET 10-  3.Increased R shoulder  MMT 1 grade to increase tolerance for ADL and work activities.MET 10-  4. Pt to  report and demonstrate proper posture in standing and sitting to decrease pain. MET 10-  5. Pt to be Independent with HEP to improve ROM and independence with ADL's.MET 10-  6. Pt to improve R shoulder active  range of motion (AROM) flexion  to 180 deg to allow for improved functional mobility to allow for improvement in IADLs. Not met 10-    Plan     Certification date: Plan of care Certification: 8/30/2019 to 12/31/2019    Progress UE strengthening per protocol.     Magdy Tovar, PT   10/28/2019

## 2019-10-30 ENCOUNTER — CLINICAL SUPPORT (OUTPATIENT)
Dept: REHABILITATION | Facility: HOSPITAL | Age: 64
End: 2019-10-30
Attending: ORTHOPAEDIC SURGERY
Payer: MEDICARE

## 2019-10-30 DIAGNOSIS — Z98.890 STATUS POST SHOULDER SURGERY: ICD-10-CM

## 2019-10-30 DIAGNOSIS — R29.898 WEAKNESS OF SHOULDER: ICD-10-CM

## 2019-10-30 DIAGNOSIS — M25.511 ACUTE PAIN OF RIGHT SHOULDER: ICD-10-CM

## 2019-10-30 DIAGNOSIS — M25.611 DECREASED RIGHT SHOULDER RANGE OF MOTION: ICD-10-CM

## 2019-10-30 PROBLEM — M75.121 COMPLETE ROTATOR CUFF TEAR OR RUPTURE OF RIGHT SHOULDER, NOT SPECIFIED AS TRAUMATIC: Chronic | Status: ACTIVE | Noted: 2019-08-20

## 2019-10-30 PROCEDURE — 97110 THERAPEUTIC EXERCISES: CPT | Mod: PN

## 2019-10-30 NOTE — PROGRESS NOTES
Physical Therapy Daily Treatment Note     Name: Lornea Rdoriguez  Clinic Number: [id number]    Therapy Diagnosis:   Encounter Diagnoses   Name Primary?    Status post shoulder surgery     Weakness of shoulder     Decreased right shoulder range of motion     Acute pain of right shoulder      Physician: Nj You MD    Visit Date: 10/30/2019    Physician Orders: PT Eval and Treat   Medical Diagnosis from Referral: Right shoulder injury  Evaluation Date: 8/30/2019  Authorization Period Expiration:12/31/2019  Plan of Care Expiration: 12/31/2019  Visit # / Visits authorized:18/ 20 PN Due: 11/23/019    DOS: 8/20/2019   POW:~9     Return to M.D: about 11-    Time In: 2:00 pm  Time Out: 3:00 pm  Total Billable Time: 30 minutes    Precautions: s/p R RTC surgery Medium to large tears ( greater than 1 cm, less than 5 cm)     Subjective     Pt reports: that he has decrease of R arm and shoulder pain today Pt states he did not do too many activities this week.   He was compliant with home exercise program.  Response to previous treatment: good  Functional change: increased shoulder range    Pain: 3/10  Location: right shoulder      Objective      CMS Impairment/Limitation/Restriction for FOTO Shoulder Survey  Status Limitation G-Code CMS Severity Modifier  Intake 32% 68%  Predicted 61% 39% Goal Status+ CJ - At least 20 percent but less than 40 percent  9/23/2019 50% 50%  10/21/2019 68% 32% Current Status CJ - At least 20 percent but less than 40 percent  D/C Status CJ **only report if this is discharge survey    Updated: 10-21-/2019  PROM R shoulder:  Flexion 180  deg   ER in scapular plane: 80 deg   IR in scapular planes 90 deg    AROM R shoulder:  Flexion:170 deg  ABD: 170 deg   ER (supine): 70 deg  IR (supine): 80 deg       Lorena received therapeutic exercises to develop strength, endurance, ROM and flexibility for 55 minutes including:    UBE 6 min forwards 1.5 resistance   Pulleys flexion in scapular planes 2 min  AAROM  Pulleys shoulder abd in scapular planes 2 min AAROM  Supine shoulder press Red wand 3x10   Wall walk shoulder flexion 3x10    Wall windshield 3x10 caused pain NP  Bear hug rTB 3x10   Supine AAROM wand ER/IR in scapular plane x 30  Supine w HOB elevated 2 notches AAROM Red wand shoulder flexion in scapular plane 3x10  Prone rows to neutral arm position 3x10 3#  Prone horizontal abd 3x10 3#  Prone shoulder extension 3x10 3#   Standing biceps curls 3# 3x10    SL Shoulder ER 3x10 2#  Standing shoulder ER Red TB 3 x 10   Standing shoulder IR RTB 3x10  Standing scapular retraction RTB 3x10   Standing lateral raise without band 2x10     NP:  Hand gripping in sling 3x10  Supine Wrist flexion and extension  3x10  Wrist pronation and supination in sling  3x10  Pendulum flexion 2 min  Seated upper traps stretch 3x30 sec   Submaximal pain free isometrics with elbow bend: Flexion/Abd/ ER/IR/ elbow flexors 3x10     Ali received the following manual therapy techniques: PROM were applied to the: R shoulder for 5 minutes, including:  STM lateral R arm     NP  PROM flexion to tolerance (180 deg)  PROM ER in scapular plane (80 deg)   IR in scapular plane (90deg)     Ali received cold pack for 10 minutes to R shoulder.     Home Exercises and Patient Education Provided     Education provided:   - Use proper sling   -Perform exercises 2xper day  -Follow PT and surgeon instructions      Written Home Exercises Provided: Patient instructed to cont prior HEP.  Exercises were reviewed and Ali was able to demonstrate them prior to the end of the session.  Ali demonstrated good  understanding of the education provided.      See EMR under Patient Instructions for exercises provided 8/30/2019.    Assessment     Pt had good tolerance to treatment today with no adverse effects. Pt tolerated progression of weights today. Pt was able to increase in dumbbells weight for all periscapular exercises in prone today with proper R shoulder muscle  fatigue and fasciculation. Pt cont with difficult to perform shoulder windshield exercise due to weakness of R RTC muscles.  Plan to cont therapy according to protocol to strengthening R RTC and periscapular.     Lorena is progressing well towards his goals.   Pt prognosis is Good.     Pt will continue to benefit from skilled outpatient physical therapy to address the deficits listed in the problem list box on initial evaluation, provide pt/family education and to maximize pt's level of independence in the home and community environment.     Pt's spiritual, cultural and educational needs considered and pt agreeable to plan of care and goals.    Anticipated barriers to physical therapy: None    GOALS: Short Term Goals: 6weeks  1.Report decreased in pain at worse less than  <   / =  5  /10  to increase tolerance for functional mobility. MET 9-   2. Pt to improve R shoulder passive range of motion (PROM) flexion to  180 deg to allow for improved functional mobility to allow for improvement in IADLs. MET 9-  3. Pt to report be conscious of impaired sitting and standing posture daily to decrease pain. MET 9-  4. Pt to tolerate HEP to improve ROM and independence with ADL's. MET 9-     Long Term Goals: 8-12  weeks  1.Report decreased in pain at worse less than  <   / =  2/10  to increase tolerance for functional mobility. MET 10-  2.  Patient will demonstrate improved overall function per FOTO Survey to CJ = at least 20% but < 40% impaired, limited or restricted score or less.MET 10-  3.Increased R shoulder  MMT 1 grade to increase tolerance for ADL and work activities.MET 10-  4. Pt to report and demonstrate proper posture in standing and sitting to decrease pain. MET 10-  5. Pt to be Independent with HEP to improve ROM and independence with ADL's.MET 10-  6. Pt to improve R shoulder active  range of motion (AROM) flexion  to 180 deg to allow for improved  functional mobility to allow for improvement in IADLs. Not met 10-    Plan     Certification date: Plan of care Certification: 8/30/2019 to 12/31/2019    Progress UE strengthening per protocol.     Magdy Tovar, PT   10/30/2019

## 2019-11-04 ENCOUNTER — CLINICAL SUPPORT (OUTPATIENT)
Dept: REHABILITATION | Facility: HOSPITAL | Age: 64
End: 2019-11-04
Attending: ORTHOPAEDIC SURGERY
Payer: MEDICARE

## 2019-11-04 DIAGNOSIS — M25.611 DECREASED RIGHT SHOULDER RANGE OF MOTION: ICD-10-CM

## 2019-11-04 DIAGNOSIS — M25.511 ACUTE PAIN OF RIGHT SHOULDER: ICD-10-CM

## 2019-11-04 DIAGNOSIS — Z98.890 STATUS POST SHOULDER SURGERY: ICD-10-CM

## 2019-11-04 DIAGNOSIS — R29.898 WEAKNESS OF SHOULDER: ICD-10-CM

## 2019-11-04 PROCEDURE — 97110 THERAPEUTIC EXERCISES: CPT | Mod: PN

## 2019-11-04 NOTE — PROGRESS NOTES
Physical Therapy Daily Treatment Note     Name: Lorena Rodriguez  Clinic Number: [id number]    Therapy Diagnosis:   Encounter Diagnoses   Name Primary?    Status post shoulder surgery     Weakness of shoulder     Decreased right shoulder range of motion     Acute pain of right shoulder      Physician: Nj You MD    Visit Date: 11/4/2019    Physician Orders: PT Eval and Treat   Medical Diagnosis from Referral: Right shoulder injury  Evaluation Date: 8/30/2019  Authorization Period Expiration:12/31/2019  Plan of Care Expiration: 12/31/2019  Visit # / Visits authorized:19/ 20 PN Due: 11/23/019    DOS: 8/20/2019   POW:~11     Return to M.D: about 11-    Time In: 2:00 pm  Time Out: 3:00 pm  Total Billable Time: 30 minutes    Precautions: s/p R RTC surgery Medium to large tears ( greater than 1 cm, less than 5 cm)     Subjective     Pt reports: that he did not have any issues over the weekend. Pt states R shoulder pain is pain free.   He was compliant with home exercise program.  Response to previous treatment: good  Functional change: increased shoulder range    Pain: 0/10  Location: right shoulder      Objective      CMS Impairment/Limitation/Restriction for FOTO Shoulder Survey  Status Limitation G-Code CMS Severity Modifier  Intake 32% 68%  Predicted 61% 39% Goal Status+ CJ - At least 20 percent but less than 40 percent  9/23/2019 50% 50%  10/21/2019 68% 32% Current Status CJ - At least 20 percent but less than 40 percent  D/C Status CJ **only report if this is discharge survey    Updated: 10-21-/2019  PROM R shoulder:  Flexion 180  deg   ER in scapular plane: 80 deg   IR in scapular planes 90 deg    AROM R shoulder:  Flexion:170 deg  ABD: 170 deg   ER (supine): 70 deg  IR (supine): 80 deg       Lorena received therapeutic exercises to develop strength, endurance, ROM and flexibility for 55 minutes including:    UBE 6 min forwards 2 resistance   Pulleys flexion in scapular planes 2 min AAROM  Pulleys  shoulder abd in scapular planes 2 min AAROM  Wall walk shoulder flexion 3x10    Wall windshield 3x10   Bear hug GTB 3x10   Prone rows to neutral arm position 3x10 3#  Prone horizontal abd 3x10 3#  Prone shoulder extension 3x10 3#   Standing biceps curls 3# 3x10    SL Shoulder ER 3x10 2#  Standing shoulder ER RTB 3 x 10   Standing shoulder IR GTB 3x10  Standing scapular retraction GTB 3x10   Standing lateral raise without band 2x10   Standing shoulder flexion YTB 3x10   Matrix row 15# 3x10   Wall clock YTB  1x15    NP:  Hand gripping in sling 3x10  Supine Wrist flexion and extension  3x10  Wrist pronation and supination in sling  3x10  Pendulum flexion 2 min  Seated upper traps stretch 3x30 sec   Submaximal pain free isometrics with elbow bend: Flexion/Abd/ ER/IR/ elbow flexors 3x10   Supine AAROM wand ER/IR in scapular plane x 30  Supine w HOB elevated 2 notches AAROM Red wand shoulder flexion in scapular plane 3x10  Supine shoulder press Red wand 3x10       Ali received the following manual therapy techniques: PROM were applied to the: R shoulder for 5 minutes, including:  STM lateral R arm     NP  PROM flexion to tolerance (180 deg)  PROM ER in scapular plane (80 deg)   IR in scapular plane (90deg)     Ali received cold pack for 10 minutes to R shoulder.     Home Exercises and Patient Education Provided     Education provided:   - Use proper sling   -Perform exercises 2xper day  -Follow PT and surgeon instructions      Written Home Exercises Provided: Patient instructed to cont prior HEP.  Exercises were reviewed and Ali was able to demonstrate them prior to the end of the session.  Ali demonstrated good  understanding of the education provided.      See EMR under Patient Instructions for exercises provided 8/30/2019.    Assessment     Pt had good tolerance to treatment today with no adverse effects. Pt tolerated progression of therapeutic exercises well. Pt was progressed in wall clock, Matrix machine row, and  standing shoulder flexion with YTB. Pt cont with weakness of R RTC and gabby-scapular muscles. Pt has been progressing well in therapy. Plan to cont therapy according to protocol to strengthening R RTC and periscapular.     Lorena is progressing well towards his goals.   Pt prognosis is Good.     Pt will continue to benefit from skilled outpatient physical therapy to address the deficits listed in the problem list box on initial evaluation, provide pt/family education and to maximize pt's level of independence in the home and community environment.     Pt's spiritual, cultural and educational needs considered and pt agreeable to plan of care and goals.    Anticipated barriers to physical therapy: None    GOALS: Short Term Goals: 6weeks  1.Report decreased in pain at worse less than  <   / =  5  /10  to increase tolerance for functional mobility. MET 9-   2. Pt to improve R shoulder passive range of motion (PROM) flexion to  180 deg to allow for improved functional mobility to allow for improvement in IADLs. MET 9-  3. Pt to report be conscious of impaired sitting and standing posture daily to decrease pain. MET 9-  4. Pt to tolerate HEP to improve ROM and independence with ADL's. MET 9-     Long Term Goals: 8-12  weeks  1.Report decreased in pain at worse less than  <   / =  2/10  to increase tolerance for functional mobility. MET 10-  2.  Patient will demonstrate improved overall function per FOTO Survey to CJ = at least 20% but < 40% impaired, limited or restricted score or less.MET 10-  3.Increased R shoulder  MMT 1 grade to increase tolerance for ADL and work activities.MET 10-  4. Pt to report and demonstrate proper posture in standing and sitting to decrease pain. MET 10-  5. Pt to be Independent with HEP to improve ROM and independence with ADL's.MET 10-  6. Pt to improve R shoulder active  range of motion (AROM) flexion  to 180 deg to allow for  improved functional mobility to allow for improvement in IADLs. Not met 10-    Plan     Certification date: Plan of care Certification: 8/30/2019 to 12/31/2019    Progress UE strengthening per protocol.     Magdy Tovar, PT   11/04/2019

## 2019-11-06 ENCOUNTER — CLINICAL SUPPORT (OUTPATIENT)
Dept: REHABILITATION | Facility: HOSPITAL | Age: 64
End: 2019-11-06
Attending: ORTHOPAEDIC SURGERY
Payer: MEDICARE

## 2019-11-06 DIAGNOSIS — M25.511 ACUTE PAIN OF RIGHT SHOULDER: ICD-10-CM

## 2019-11-06 DIAGNOSIS — M25.611 DECREASED RIGHT SHOULDER RANGE OF MOTION: ICD-10-CM

## 2019-11-06 DIAGNOSIS — Z98.890 STATUS POST SHOULDER SURGERY: ICD-10-CM

## 2019-11-06 DIAGNOSIS — R29.898 WEAKNESS OF SHOULDER: ICD-10-CM

## 2019-11-06 PROCEDURE — 97110 THERAPEUTIC EXERCISES: CPT | Mod: PN

## 2019-11-06 NOTE — PROGRESS NOTES
Physical Therapy Daily Treatment Note     Name: Lorena Rodriguez  Clinic Number: [id number]    Therapy Diagnosis:   Encounter Diagnoses   Name Primary?    Status post shoulder surgery     Weakness of shoulder     Decreased right shoulder range of motion     Acute pain of right shoulder      Physician: Nj You MD    Visit Date: 11/6/2019    Physician Orders: PT Eval and Treat   Medical Diagnosis from Referral: Right shoulder injury  Evaluation Date: 8/30/2019  Authorization Period Expiration:12/31/2019  Plan of Care Expiration: 12/31/2019  Visit # / Visits authorized:19/ 20 PN Due: 11/23/019    DOS: 8/20/2019   POW:~11     Return to M.D: about 11-    Time In: 11:00 am  Time Out: 12:00 am  Total Billable Time: 30 minutes    Precautions: s/p R RTC surgery Medium to large tears ( greater than 1 cm, less than 5 cm)     Subjective     Pt reports: that pt does not have any complain of R shoulder pain. Pt states he does not want to perform prone exercises due to increase dizziness today.   He was compliant with home exercise program.  Response to previous treatment: good  Functional change: increased shoulder range    Pain: 0/10  Location: right shoulder      Objective      CMS Impairment/Limitation/Restriction for FOTO Shoulder Survey  Status Limitation G-Code CMS Severity Modifier  Intake 32% 68%  Predicted 61% 39% Goal Status+ CJ - At least 20 percent but less than 40 percent  9/23/2019 50% 50%  10/21/2019 68% 32% Current Status CJ - At least 20 percent but less than 40 percent  D/C Status CJ **only report if this is discharge survey    Updated: 10-21-/2019  PROM R shoulder:  Flexion 180  deg   ER in scapular plane: 80 deg   IR in scapular planes 90 deg    AROM R shoulder:  Flexion:170 deg  ABD: 170 deg   ER (supine): 70 deg  IR (supine): 80 deg       Lorena received therapeutic exercises to develop strength, endurance, ROM and flexibility for 55 minutes including:    UBE 6 min forwards 2 resistance   Pulleys  flexion in scapular planes 2 min AAROM  Pulleys shoulder abd in scapular planes 2 min AAROM  Wall walk shoulder flexion 3x10    Wall windshield 3x10   Bear hug GTB 3x10   Prone rows to neutral arm position 3x10 3# NP today   Prone horizontal abd 3x10 3# NP today  Prone shoulder extension 3x10 3#  NP today   Standing biceps curls 3# 3x10    SL Shoulder ER 3x10 2#  Standing shoulder ER RTB 3 x 10   Standing shoulder IR GTB 3x10  Standing scapular retraction GTB 3x10   Standing lateral raise without band 2x10   Standing shoulder flexion YTB 3x10   Matrix row 35# 3x10   Wall clock YTB  2x15  Standing T's, I's, W's 3x10   Wall push ups 3x10     NP:  Hand gripping in sling 3x10  Supine Wrist flexion and extension  3x10  Wrist pronation and supination in sling  3x10  Pendulum flexion 2 min  Seated upper traps stretch 3x30 sec   Submaximal pain free isometrics with elbow bend: Flexion/Abd/ ER/IR/ elbow flexors 3x10   Supine AAROM wand ER/IR in scapular plane x 30  Supine w HOB elevated 2 notches AAROM Red wand shoulder flexion in scapular plane 3x10  Supine shoulder press Red wand 3x10       Ali received the following manual therapy techniques: PROM were applied to the: R shoulder for 0 minutes, including:  STM lateral R arm     NP  PROM flexion to tolerance (180 deg)  PROM ER in scapular plane (80 deg)   IR in scapular plane (90deg)     Ali received cold pack for 10 minutes to R shoulder.     Home Exercises and Patient Education Provided     Education provided:   - Use proper sling   -Perform exercises 2xper day  -Follow PT and surgeon instructions      Written Home Exercises Provided: Patient instructed to cont prior HEP.  Exercises were reviewed and Lorena was able to demonstrate them prior to the end of the session.  Ali demonstrated good  understanding of the education provided.      See EMR under Patient Instructions for exercises provided 8/30/2019.    Assessment     Pt had good tolerance to treatment today with no  adverse effects. Pt did presented with some c/o of catching during standing shoulder abd, but catching pain went away quickly. Pt tolerated progression of CKC exercises well today. Mod v/c's to perform wall push ups, standing T's,W's and I's. PT noted slightly increase of R shoulder hiking today during new therapeutic exercises. Tactile cues required to inhibit over activation of R upper traps. Plan to cont therapy according to protocol to strengthening R RTC and periscapular.     Lorena is progressing well towards his goals.   Pt prognosis is Good.     Pt will continue to benefit from skilled outpatient physical therapy to address the deficits listed in the problem list box on initial evaluation, provide pt/family education and to maximize pt's level of independence in the home and community environment.     Pt's spiritual, cultural and educational needs considered and pt agreeable to plan of care and goals.    Anticipated barriers to physical therapy: None    GOALS: Short Term Goals: 6weeks  1.Report decreased in pain at worse less than  <   / =  5  /10  to increase tolerance for functional mobility. MET 9-   2. Pt to improve R shoulder passive range of motion (PROM) flexion to  180 deg to allow for improved functional mobility to allow for improvement in IADLs. MET 9-  3. Pt to report be conscious of impaired sitting and standing posture daily to decrease pain. MET 9-  4. Pt to tolerate HEP to improve ROM and independence with ADL's. MET 9-     Long Term Goals: 8-12  weeks  1.Report decreased in pain at worse less than  <   / =  2/10  to increase tolerance for functional mobility. MET 10-  2.  Patient will demonstrate improved overall function per FOTO Survey to CJ = at least 20% but < 40% impaired, limited or restricted score or less.MET 10-  3.Increased R shoulder  MMT 1 grade to increase tolerance for ADL and work activities.MET 10-  4. Pt to report and  demonstrate proper posture in standing and sitting to decrease pain. MET 10-  5. Pt to be Independent with HEP to improve ROM and independence with ADL's.MET 10-  6. Pt to improve R shoulder active  range of motion (AROM) flexion  to 180 deg to allow for improved functional mobility to allow for improvement in IADLs. Not met 10-    Plan     Certification date: Plan of care Certification: 8/30/2019 to 12/31/2019    Progress UE strengthening per protocol.     Magdy Tovar, PT   11/06/2019

## 2019-11-11 ENCOUNTER — CLINICAL SUPPORT (OUTPATIENT)
Dept: REHABILITATION | Facility: HOSPITAL | Age: 64
End: 2019-11-11
Attending: ORTHOPAEDIC SURGERY
Payer: MEDICARE

## 2019-11-11 DIAGNOSIS — M25.611 DECREASED RIGHT SHOULDER RANGE OF MOTION: ICD-10-CM

## 2019-11-11 DIAGNOSIS — Z98.890 STATUS POST SHOULDER SURGERY: ICD-10-CM

## 2019-11-11 DIAGNOSIS — M25.511 ACUTE PAIN OF RIGHT SHOULDER: ICD-10-CM

## 2019-11-11 DIAGNOSIS — R29.898 WEAKNESS OF SHOULDER: ICD-10-CM

## 2019-11-11 PROCEDURE — 97110 THERAPEUTIC EXERCISES: CPT | Mod: PN

## 2019-11-11 NOTE — PROGRESS NOTES
Physical Therapy Daily Treatment Note     Name: Lorena Rodriguez  Clinic Number: [id number]    Therapy Diagnosis:   No diagnosis found.  Physician: Nj You MD    Visit Date: 11/11/2019    Physician Orders: PT Eval and Treat   Medical Diagnosis from Referral: Right shoulder injury  Evaluation Date: 8/30/2019  Authorization Period Expiration:12/31/2019  Plan of Care Expiration: 12/31/2019  Visit # / Visits authorized:   1/5       Old referral 20/ 20 PN Due: 11/23/019    DOS: 8/20/2019   POW:~11     Return to M.D: about 11-    Time In: 11:00 am  Time Out: 12:00 am  Total Billable Time: 30 minutes    Precautions: s/p R RTC surgery Medium to large tears ( greater than 1 cm, less than 5 cm)     Subjective     Pt reports: that he does not have any R shoulder pain. Pt states he is driving himself with no issues. Pt denies any issues over the weekend. Pt states he has to leave at 11:40 am since he has another appts schedule at 12:00 am.   He was compliant with home exercise program.  Response to previous treatment: good  Functional change: increased shoulder range    Pain: 0/10  Location: right shoulder      Objective      CMS Impairment/Limitation/Restriction for FOTO Shoulder Survey  Status Limitation G-Code CMS Severity Modifier  Intake 32% 68%  Predicted 61% 39% Goal Status+ CJ - At least 20 percent but less than 40 percent  9/23/2019 50% 50%  10/21/2019 68% 32% Current Status CJ - At least 20 percent but less than 40 percent  D/C Status CJ **only report if this is discharge survey    Updated: 10-21-/2019  PROM R shoulder:  Flexion 180  deg   ER in scapular plane: 80 deg   IR in scapular planes 90 deg    AROM R shoulder:  Flexion:170 deg  ABD: 170 deg   ER (supine): 70 deg  IR (supine): 80 deg       Lorena received therapeutic exercises to develop strength, endurance, ROM and flexibility for 40 minutes including:    UBE 6 min forwards 2 resistance   Pulleys flexion in scapular planes 2 min AAROM  Pulleys shoulder  abd in scapular planes 2 min AAROM  Wall walk shoulder flexion 3x10    Wall windshield 3x10   Bear hug GTB 3x10   Prone rows to neutral arm position 3x10 3# NP today   Prone horizontal abd 3x10 3# NP today  Prone shoulder extension 3x10 3#  NP today   Standing biceps curls 3# 3x10    SL Shoulder ER 3x10 2#  Standing shoulder ER RTB 3 x 10   Standing shoulder IR GTB 3x10  Standing scapular retraction GTB 3x10   Standing lateral raise without band 2x10   Standing shoulder flexion RTB 3x10   Matrix row 35# 3x10   Wall clock YTB  2x15  Standing T's, I's, W's 3x10   Wall push ups 3x10     NP:  Hand gripping in sling 3x10  Supine Wrist flexion and extension  3x10  Wrist pronation and supination in sling  3x10  Pendulum flexion 2 min  Seated upper traps stretch 3x30 sec   Submaximal pain free isometrics with elbow bend: Flexion/Abd/ ER/IR/ elbow flexors 3x10   Supine AAROM wand ER/IR in scapular plane x 30  Supine w HOB elevated 2 notches AAROM Red wand shoulder flexion in scapular plane 3x10  Supine shoulder press Red wand 3x10       Ali received the following manual therapy techniques: PROM were applied to the: R shoulder for 0 minutes, including:  STM lateral R arm     NP  PROM flexion to tolerance (180 deg)  PROM ER in scapular plane (80 deg)   IR in scapular plane (90deg)     Ali received cold pack for 10 minutes to R shoulder.     Home Exercises and Patient Education Provided     Education provided:   - Use proper sling   -Perform exercises 2xper day  -Follow PT and surgeon instructions      Written Home Exercises Provided: Patient instructed to cont prior HEP.  Exercises were reviewed and Lorena was able to demonstrate them prior to the end of the session.  Lorena demonstrated good  understanding of the education provided.      See EMR under Patient Instructions for exercises provided 8/30/2019.    Assessment     Pt had good tolerance to treatment today with no adverse effects. Pt has to leave therapy 20 min early today.  Pt tolerated CKC and OCK exercises well today. Pt demonstrated less R upper traps activation. Pt cont to improve R RTC and gabby-scapular muscle strength. Plan to cont therapy according to protocol to strengthening R RTC and periscapular.     Lorena is progressing well towards his goals.   Pt prognosis is Good.     Pt will continue to benefit from skilled outpatient physical therapy to address the deficits listed in the problem list box on initial evaluation, provide pt/family education and to maximize pt's level of independence in the home and community environment.     Pt's spiritual, cultural and educational needs considered and pt agreeable to plan of care and goals.    Anticipated barriers to physical therapy: None    GOALS: Short Term Goals: 6weeks  1.Report decreased in pain at worse less than  <   / =  5  /10  to increase tolerance for functional mobility. MET 9-   2. Pt to improve R shoulder passive range of motion (PROM) flexion to  180 deg to allow for improved functional mobility to allow for improvement in IADLs. MET 9-  3. Pt to report be conscious of impaired sitting and standing posture daily to decrease pain. MET 9-  4. Pt to tolerate HEP to improve ROM and independence with ADL's. MET 9-     Long Term Goals: 8-12  weeks  1.Report decreased in pain at worse less than  <   / =  2/10  to increase tolerance for functional mobility. MET 10-  2.  Patient will demonstrate improved overall function per FOTO Survey to CJ = at least 20% but < 40% impaired, limited or restricted score or less.MET 10-  3.Increased R shoulder  MMT 1 grade to increase tolerance for ADL and work activities.MET 10-  4. Pt to report and demonstrate proper posture in standing and sitting to decrease pain. MET 10-  5. Pt to be Independent with HEP to improve ROM and independence with ADL's.MET 10-  6. Pt to improve R shoulder active  range of motion (AROM) flexion  to 180  deg to allow for improved functional mobility to allow for improvement in IADLs. Not met 10-    Plan     Certification date: Plan of care Certification: 8/30/2019 to 12/31/2019    Progress UE strengthening per protocol.     Magdy Tovar, PT   11/11/2019

## 2019-11-13 ENCOUNTER — CLINICAL SUPPORT (OUTPATIENT)
Dept: REHABILITATION | Facility: HOSPITAL | Age: 64
End: 2019-11-13
Attending: ORTHOPAEDIC SURGERY
Payer: MEDICARE

## 2019-11-13 DIAGNOSIS — R29.898 WEAKNESS OF SHOULDER: ICD-10-CM

## 2019-11-13 DIAGNOSIS — M25.611 DECREASED RIGHT SHOULDER RANGE OF MOTION: ICD-10-CM

## 2019-11-13 DIAGNOSIS — M25.511 ACUTE PAIN OF RIGHT SHOULDER: ICD-10-CM

## 2019-11-13 DIAGNOSIS — Z98.890 STATUS POST SHOULDER SURGERY: ICD-10-CM

## 2019-11-13 PROCEDURE — 97110 THERAPEUTIC EXERCISES: CPT | Mod: PN

## 2019-11-13 NOTE — PROGRESS NOTES
Physical Therapy Daily Treatment Note     Name: Lorena Rodriguez  Clinic Number: [id number]    Therapy Diagnosis:   Encounter Diagnoses   Name Primary?    Status post shoulder surgery     Weakness of shoulder     Decreased right shoulder range of motion     Acute pain of right shoulder      Physician: Nj You MD    Visit Date: 11/13/2019    Physician Orders: PT Eval and Treat   Medical Diagnosis from Referral: Right shoulder injury  Evaluation Date: 8/30/2019  Authorization Period Expiration:12/31/2019  Plan of Care Expiration: 12/31/2019  Visit # / Visits authorized:   2/5       Old referral 20/ 20 PN Due: 11/23/019    DOS: 8/20/2019   POW:~11     Return to M.D: about 11-    Time In: 11:00 am  Time Out: 12:00 am  Total Billable Time: 30 minutes    Precautions: s/p R RTC surgery Medium to large tears ( greater than 1 cm, less than 5 cm)     Subjective     Pt reports: that he is feeling good. Pt denies any R shoulder pain   He was compliant with home exercise program.  Response to previous treatment: good  Functional change: increased shoulder range    Pain: 0/10  Location: right shoulder      Objective      CMS Impairment/Limitation/Restriction for FOTO Shoulder Survey  Status Limitation G-Code CMS Severity Modifier  Intake 32% 68%  Predicted 61% 39% Goal Status+ CJ - At least 20 percent but less than 40 percent  9/23/2019 50% 50%  10/21/2019 68% 32% Current Status CJ - At least 20 percent but less than 40 percent  D/C Status CJ **only report if this is discharge survey    Updated: 10-21-/2019  PROM R shoulder:  Flexion 180  deg   ER in scapular plane: 80 deg   IR in scapular planes 90 deg    AROM R shoulder:  Flexion:170 deg  ABD: 170 deg   ER (supine): 70 deg  IR (supine): 80 deg       Lorena received therapeutic exercises to develop strength, endurance, ROM and flexibility for 45 minutes including:    UBE 6 min forwards 2 resistance   Pulleys flexion in scapular planes 2 min AAROM  Pulleys shoulder abd  in scapular planes 2 min AAROM  Wall walk shoulder flexion 3x10    Wall windshield 3x10   Bear hug GTB 3x10   Prone rows to neutral arm position 3x10 3# NP today   Prone horizontal abd 3x10 3# NP today  Prone shoulder extension 3x10 3#  NP today   Standing biceps curls 3# 3x10    SL Shoulder ER 3x10 2#  Standing shoulder ER RTB 3 x 10   Standing shoulder IR GTB 3x10  Standing scapular retraction GTB 3x10   Standing lateral raise without band 2x10   Standing shoulder flexion RTB 3x10   Matrix row 35# 3x10   Wall clock YTB  2x15  Standing T's, I's, W's 3x10   Wall push ups 3x10     NP:  Hand gripping in sling 3x10  Supine Wrist flexion and extension  3x10  Wrist pronation and supination in sling  3x10  Pendulum flexion 2 min  Seated upper traps stretch 3x30 sec   Submaximal pain free isometrics with elbow bend: Flexion/Abd/ ER/IR/ elbow flexors 3x10   Supine AAROM wand ER/IR in scapular plane x 30  Supine w HOB elevated 2 notches AAROM Red wand shoulder flexion in scapular plane 3x10  Supine shoulder press Red wand 3x10       Ali received the following manual therapy techniques: PROM were applied to the: R shoulder for 0 minutes, including:  STM lateral R arm     NP  PROM flexion to tolerance (180 deg)  PROM ER in scapular plane (80 deg)   IR in scapular plane (90deg)     Ali received cold pack for 10 minutes to R shoulder.     Home Exercises and Patient Education Provided     Education provided:   - Use proper sling   -Perform exercises 2xper day  -Follow PT and surgeon instructions      Written Home Exercises Provided: Patient instructed to cont prior HEP.  Exercises were reviewed and Lorena was able to demonstrate them prior to the end of the session.  Lorena demonstrated good  understanding of the education provided.      See EMR under Patient Instructions for exercises provided 8/30/2019.    Assessment     Pt had good tolerance to treatment today with no adverse effects. Pt cont tolerated PT session well. Pt  Cont  demonstrating less R upper traps activation Pt cont to improve R RTC muscle strength, Proper muscle activation during exercises. Pt also demonstrated proper muscle firing. Pt has to leave therapy 20 min early today. Pt tolerated CKC and OCK exercises well today. Pt demonstrated less R upper traps activation. Pt cont to improve R RTC and gabby-scapular muscle strength. Plan to cont therapy according to protocol to strengthening R RTC and periscapular.     Lorena is progressing well towards his goals.   Pt prognosis is Good.     Pt will continue to benefit from skilled outpatient physical therapy to address the deficits listed in the problem list box on initial evaluation, provide pt/family education and to maximize pt's level of independence in the home and community environment.     Pt's spiritual, cultural and educational needs considered and pt agreeable to plan of care and goals.    Anticipated barriers to physical therapy: None    GOALS: Short Term Goals: 6weeks  1.Report decreased in pain at worse less than  <   / =  5  /10  to increase tolerance for functional mobility. MET 9-   2. Pt to improve R shoulder passive range of motion (PROM) flexion to  180 deg to allow for improved functional mobility to allow for improvement in IADLs. MET 9-  3. Pt to report be conscious of impaired sitting and standing posture daily to decrease pain. MET 9-  4. Pt to tolerate HEP to improve ROM and independence with ADL's. MET 9-     Long Term Goals: 8-12  weeks  1.Report decreased in pain at worse less than  <   / =  2/10  to increase tolerance for functional mobility. MET 10-  2.  Patient will demonstrate improved overall function per FOTO Survey to CJ = at least 20% but < 40% impaired, limited or restricted score or less.MET 10-  3.Increased R shoulder  MMT 1 grade to increase tolerance for ADL and work activities.MET 10-  4. Pt to report and demonstrate proper posture in standing  and sitting to decrease pain. MET 10-  5. Pt to be Independent with HEP to improve ROM and independence with ADL's.MET 10-  6. Pt to improve R shoulder active  range of motion (AROM) flexion  to 180 deg to allow for improved functional mobility to allow for improvement in IADLs. Not met 10-    Plan     Certification date: Plan of care Certification: 8/30/2019 to 12/31/2019    Progress UE strengthening per protocol.     Magdy Tovar, PT   11/13/2019

## 2019-11-22 ENCOUNTER — CLINICAL SUPPORT (OUTPATIENT)
Dept: REHABILITATION | Facility: HOSPITAL | Age: 64
End: 2019-11-22
Attending: ORTHOPAEDIC SURGERY
Payer: MEDICARE

## 2019-11-22 DIAGNOSIS — Z98.890 STATUS POST SHOULDER SURGERY: ICD-10-CM

## 2019-11-22 DIAGNOSIS — M25.611 DECREASED RIGHT SHOULDER RANGE OF MOTION: ICD-10-CM

## 2019-11-22 DIAGNOSIS — R29.898 WEAKNESS OF SHOULDER: ICD-10-CM

## 2019-11-22 DIAGNOSIS — M25.511 ACUTE PAIN OF RIGHT SHOULDER: ICD-10-CM

## 2019-11-22 PROCEDURE — 97110 THERAPEUTIC EXERCISES: CPT | Mod: PN

## 2019-11-22 NOTE — PROGRESS NOTES
Physical Therapy Daily Treatment Note     Name: Lorena Rodriguez  Clinic Number: [id number]    Therapy Diagnosis:   Encounter Diagnoses   Name Primary?    Status post shoulder surgery     Weakness of shoulder     Decreased right shoulder range of motion     Acute pain of right shoulder      Physician: Nj You MD    Visit Date: 11/22/2019    Physician Orders: PT Eval and Treat   Medical Diagnosis from Referral: Right shoulder injury  Evaluation Date: 8/30/2019  Authorization Period Expiration:12/31/2019  Plan of Care Expiration: 12/31/2019  Visit # / Visits authorized:   3/5       Old referral 20/ 20 PN Due: 11/23/019    DOS: 8/20/2019   POW:~11     Return to M.D: about 11-    Time In: 2:00 pm  Time Out: 2:30 pm  Total Billable Time: 30 minutes    Precautions: s/p R RTC surgery Medium to large tears ( greater than 1 cm, less than 5 cm)     Subjective     Pt reports: that he is feeling good. Pt denies any R shoulder pain   He was compliant with home exercise program.  Response to previous treatment: good  Functional change: increased shoulder range    Pain: 0/10  Location: right shoulder      Objective    CMS Impairment/Limitation/Restriction for FOTO Shoulder Survey  Status Limitation G-Code CMS Severity Modifier  Intake 32% 68%  Predicted 61% 39% Goal Status+ CJ - At least 20 percent but less than 40 percent  9/23/2019 50% 50%  10/21/2019 68% 32%  11/22/2019 100% 0% Current Status CH - 0 percent  D/C Status CH **only report if this is discharge survey    Updated: 11-22-/2019  PROM R shoulder:  Flexion 180  deg   ER in scapular plane: 80 deg   IR in scapular planes 90 deg    AROM R shoulder:  Flexion:180 deg  ABD: 180 deg   ER (supine): 70 deg  IR (supine): 80 deg     Overall R shoulder MMT 4+/5      Lorena received therapeutic exercises to develop strength, endurance, ROM and flexibility for 45 minutes including:    UBE 6 min forwards 2 resistance   Pulleys flexion in scapular planes 2 min AAROM  Pulleys  shoulder abd in scapular planes 2 min AAROM  Wall walk shoulder flexion 3x10    Wall windshield 3x10   Bear hug GTB 3x10   Prone rows to neutral arm position 3x10 3# NP today   Prone horizontal abd 3x10 3# NP today  Prone shoulder extension 3x10 3#  NP today   Standing biceps curls 3# 3x10    SL Shoulder ER 3x10 2#  Standing shoulder ER RTB 3 x 10   Standing shoulder IR GTB 3x10  Standing scapular retraction GTB 3x10   Standing lateral raise without band 2x10   Standing shoulder flexion RTB 3x10   Matrix row 35# 3x10   Wall clock YTB  2x15  Standing T's, I's, W's 3x10   Wall push ups 3x10     NP:  Hand gripping in sling 3x10  Supine Wrist flexion and extension  3x10  Wrist pronation and supination in sling  3x10  Pendulum flexion 2 min  Seated upper traps stretch 3x30 sec   Submaximal pain free isometrics with elbow bend: Flexion/Abd/ ER/IR/ elbow flexors 3x10   Supine AAROM wand ER/IR in scapular plane x 30  Supine w HOB elevated 2 notches AAROM Red wand shoulder flexion in scapular plane 3x10  Supine shoulder press Red wand 3x10       Ali received the following manual therapy techniques: PROM were applied to the: R shoulder for 0 minutes, including:  STM lateral R arm     NP  PROM flexion to tolerance (180 deg)  PROM ER in scapular plane (80 deg)   IR in scapular plane (90deg)     Ali received cold pack for 10 minutes to R shoulder.     Home Exercises and Patient Education Provided     Education provided:   - Use proper sling   -Perform exercises 2xper day  -Follow PT and surgeon instructions      Written Home Exercises Provided: Patient instructed to cont prior HEP.  Exercises were reviewed and Lorena was able to demonstrate them prior to the end of the session.  Ali demonstrated good  understanding of the education provided.      See EMR under Patient Instructions for exercises provided 8/30/2019.    Assessment     D/c Note: Pt is tolerating therapy well. Pt was re-assessed today. Pt demonstrated improve R shoulder  AROM to WFL in all planes. Pt also demonstrated R shoulder MMT 4+/5 in all planes. FOTO shoulder survey demonstrated 0% limitations. Pt tolerated therapy very well. Pt has met 6/6 LTGS. Overall, pt tolerated therapy well. Pt is ready to be d/c.     Lorena is progressing well towards his goals.   Pt prognosis is Good.     Pt will continue to benefit from skilled outpatient physical therapy to address the deficits listed in the problem list box on initial evaluation, provide pt/family education and to maximize pt's level of independence in the home and community environment.     Pt's spiritual, cultural and educational needs considered and pt agreeable to plan of care and goals.    Anticipated barriers to physical therapy: None    GOALS: Short Term Goals: 6weeks  1.Report decreased in pain at worse less than  <   / =  5  /10  to increase tolerance for functional mobility. MET 9-   2. Pt to improve R shoulder passive range of motion (PROM) flexion to  180 deg to allow for improved functional mobility to allow for improvement in IADLs. MET 9-  3. Pt to report be conscious of impaired sitting and standing posture daily to decrease pain. MET 9-  4. Pt to tolerate HEP to improve ROM and independence with ADL's. MET 9-     Long Term Goals: 8-12  weeks  1.Report decreased in pain at worse less than  <   / =  2/10  to increase tolerance for functional mobility. MET 10-  2.  Patient will demonstrate improved overall function per FOTO Survey to CJ = at least 20% but < 40% impaired, limited or restricted score or less.MET 10-  3.Increased R shoulder  MMT 1 grade to increase tolerance for ADL and work activities.MET 10-  4. Pt to report and demonstrate proper posture in standing and sitting to decrease pain. MET 10-  5. Pt to be Independent with HEP to improve ROM and independence with ADL's.MET 10-  6. Pt to improve R shoulder active  range of motion (AROM) flexion  to  180 deg to allow for improved functional mobility to allow for improvement in IADLs. met 11-    Plan     Certification date: Plan of care Certification: 8/30/2019 to 12/31/2019    Progress UE strengthening per protocol.     Magdy Tovar, PT   11/22/2019

## 2020-06-01 ENCOUNTER — TELEPHONE (OUTPATIENT)
Dept: NEUROLOGY | Facility: CLINIC | Age: 65
End: 2020-06-01

## 2020-06-01 NOTE — TELEPHONE ENCOUNTER
----- Message from Sonia Alcocer sent at 6/1/2020  1:00 PM CDT -----  Contact: Pt's Daughter   Wanted to see about getting a second opinion for her father about a stroke they think he had. Said the Pt didn't like the doctors at [facility] where he went originally and would rather come here and see someone in vascular neurology. Please call back    Callback: [phone]

## 2020-06-01 NOTE — TELEPHONE ENCOUNTER
Spoke with pt's dtr. She stated he went to the er at [facility]. He was having problems with vision and weakness. The CT Scan in the ed showed signs of a stroke. They wanted to do and MRI but pt refused. He did not want to stay. The doctors also wanted to admit over the weekend pt refused. He did not have the MRI done. Pt's cardiologist is doing an ultrasound. They are due to get results on Thursday. Pt did not want to go back to [facility] doctors. Instructed dtr the first available is 07/20/20 at [facility]. Dtr asked what should she do? Instructed her if symptoms get worse or new symptoms take him back to emergency room. Suggested she also call pcp to inform that he is possibly having strokes and maybe the pcp could order the MRI. She asked if a physician from her could order the MRI until they are seen. Instructed dtr they would not until they have seen the pt. Dtr verbalized understanding and accepted the 07/20/20 appt.

## 2020-06-01 NOTE — TELEPHONE ENCOUNTER
Spoke with daughter, Nicole, and told her Dr. Vences is out for 2 weeks, and doesn't have anything before 7/20/20 (when patient's 2nd opinion is scheduled).  She verbalized understanding.      ----- Message from Kristopher Burnette sent at 6/1/2020  1:28 PM CDT -----  Contact: pt daughter Nicole  Pt daughter Nicole called and stated pt has had 2 strokes. Pt had a stroke on Memorial Day and lost vision in left eye.     Nicole stated pt has seen by a Dr Stevens who she felt did not treat pt well.    Nicole would like to make an appt with your office as soon as possible to est care and have the pt checked out     Nicole can be reached at [phone]   Pt wife Sheyla can be reached at [phone]

## 2020-06-05 ENCOUNTER — NURSE TRIAGE (OUTPATIENT)
Dept: ADMINISTRATIVE | Facility: CLINIC | Age: 65
End: 2020-06-05

## 2020-06-06 ENCOUNTER — HOSPITAL ENCOUNTER (INPATIENT)
Facility: HOSPITAL | Age: 65
LOS: 2 days | Discharge: HOME | DRG: 064 | End: 2020-06-08
Attending: EMERGENCY MEDICINE | Admitting: PSYCHIATRY & NEUROLOGY
Payer: MEDICARE

## 2020-06-06 DIAGNOSIS — I25.10 CORONARY ARTERY DISEASE INVOLVING NATIVE CORONARY ARTERY OF NATIVE HEART WITHOUT ANGINA PECTORIS: ICD-10-CM

## 2020-06-06 DIAGNOSIS — I63.9 CEREBELLAR STROKE: ICD-10-CM

## 2020-06-06 DIAGNOSIS — I69.398 HOMONYMOUS HEMIANOPSIA FOLLOWING CEREBROVASCULAR ACCIDENT: Primary | ICD-10-CM

## 2020-06-06 DIAGNOSIS — H53.469 HOMONYMOUS HEMIANOPSIA FOLLOWING CEREBROVASCULAR ACCIDENT: Primary | ICD-10-CM

## 2020-06-06 DIAGNOSIS — I63.9 STROKE: ICD-10-CM

## 2020-06-06 DIAGNOSIS — G45.9 TRANSIENT CEREBRAL ISCHEMIA, UNSPECIFIED TYPE: ICD-10-CM

## 2020-06-06 DIAGNOSIS — I63.441 EMBOLIC STROKE INVOLVING RIGHT CEREBELLAR ARTERY: ICD-10-CM

## 2020-06-06 PROBLEM — I10 ESSENTIAL (PRIMARY) HYPERTENSION: Status: ACTIVE | Noted: 2020-06-06

## 2020-06-06 PROBLEM — E78.5 HYPERLIPEMIA: Status: ACTIVE | Noted: 2020-06-06

## 2020-06-06 LAB
ALBUMIN SERPL BCP-MCNC: 3.8 G/DL (ref 3.5–5.2)
ALP SERPL-CCNC: 64 U/L (ref 55–135)
ALT SERPL W/O P-5'-P-CCNC: 40 U/L (ref 10–44)
ANION GAP SERPL CALC-SCNC: 8 MMOL/L (ref 8–16)
AST SERPL-CCNC: 29 U/L (ref 10–40)
BASOPHILS # BLD AUTO: 0.05 K/UL (ref 0–0.2)
BASOPHILS NFR BLD: 0.5 % (ref 0–1.9)
BILIRUB SERPL-MCNC: 0.4 MG/DL (ref 0.1–1)
BILIRUB UR QL STRIP: NEGATIVE
BUN SERPL-MCNC: 11 MG/DL (ref 8–23)
CALCIUM SERPL-MCNC: 9.5 MG/DL (ref 8.7–10.5)
CHLORIDE SERPL-SCNC: 105 MMOL/L (ref 95–110)
CHOLEST SERPL-MCNC: 129 MG/DL (ref 120–199)
CHOLEST/HDLC SERPL: 2.6 {RATIO} (ref 2–5)
CLARITY UR REFRACT.AUTO: CLEAR
CO2 SERPL-SCNC: 26 MMOL/L (ref 23–29)
COLOR UR AUTO: YELLOW
CREAT SERPL-MCNC: 0.8 MG/DL (ref 0.5–1.4)
CREAT SERPL-MCNC: 0.8 MG/DL (ref 0.5–1.4)
DIFFERENTIAL METHOD: ABNORMAL
EOSINOPHIL # BLD AUTO: 0.4 K/UL (ref 0–0.5)
EOSINOPHIL NFR BLD: 3.6 % (ref 0–8)
ERYTHROCYTE [DISTWIDTH] IN BLOOD BY AUTOMATED COUNT: 15 % (ref 11.5–14.5)
EST. GFR  (AFRICAN AMERICAN): >60 ML/MIN/1.73 M^2
EST. GFR  (NON AFRICAN AMERICAN): >60 ML/MIN/1.73 M^2
GLUCOSE SERPL-MCNC: 98 MG/DL (ref 70–110)
GLUCOSE UR QL STRIP: NEGATIVE
HCT VFR BLD AUTO: 42.9 % (ref 40–54)
HDLC SERPL-MCNC: 50 MG/DL (ref 40–75)
HDLC SERPL: 38.8 % (ref 20–50)
HGB BLD-MCNC: 13.1 G/DL (ref 14–18)
HGB UR QL STRIP: NEGATIVE
IMM GRANULOCYTES # BLD AUTO: 0.03 K/UL (ref 0–0.04)
IMM GRANULOCYTES NFR BLD AUTO: 0.3 % (ref 0–0.5)
INR PPP: 1 (ref 0.8–1.2)
KETONES UR QL STRIP: NEGATIVE
LDLC SERPL CALC-MCNC: 58.2 MG/DL (ref 63–159)
LEUKOCYTE ESTERASE UR QL STRIP: NEGATIVE
LYMPHOCYTES # BLD AUTO: 2.2 K/UL (ref 1–4.8)
LYMPHOCYTES NFR BLD: 22.2 % (ref 18–48)
MCH RBC QN AUTO: 25.5 PG (ref 27–31)
MCHC RBC AUTO-ENTMCNC: 30.5 G/DL (ref 32–36)
MCV RBC AUTO: 84 FL (ref 82–98)
MONOCYTES # BLD AUTO: 1.2 K/UL (ref 0.3–1)
MONOCYTES NFR BLD: 12.2 % (ref 4–15)
NEUTROPHILS # BLD AUTO: 6 K/UL (ref 1.8–7.7)
NEUTROPHILS NFR BLD: 61.2 % (ref 38–73)
NITRITE UR QL STRIP: NEGATIVE
NONHDLC SERPL-MCNC: 79 MG/DL
NRBC BLD-RTO: 0 /100 WBC
PH UR STRIP: 7 [PH] (ref 5–8)
PLATELET # BLD AUTO: 241 K/UL (ref 150–350)
PMV BLD AUTO: 9.8 FL (ref 9.2–12.9)
POC PTINR: 1 (ref 0.9–1.2)
POC PTWBT: 11.6 SEC (ref 9.7–14.3)
POCT GLUCOSE: 95 MG/DL (ref 70–110)
POTASSIUM SERPL-SCNC: 3.9 MMOL/L (ref 3.5–5.1)
PROT SERPL-MCNC: 7.7 G/DL (ref 6–8.4)
PROT UR QL STRIP: NEGATIVE
PROTHROMBIN TIME: 10.6 SEC (ref 9–12.5)
RBC # BLD AUTO: 5.13 M/UL (ref 4.6–6.2)
SAMPLE: NORMAL
SAMPLE: NORMAL
SODIUM SERPL-SCNC: 139 MMOL/L (ref 136–145)
SP GR UR STRIP: 1.01 (ref 1–1.03)
T4 FREE SERPL-MCNC: 1.37 NG/DL (ref 0.71–1.51)
TRIGL SERPL-MCNC: 104 MG/DL (ref 30–150)
TSH SERPL DL<=0.005 MIU/L-ACNC: 0.03 UIU/ML (ref 0.4–4)
URN SPEC COLLECT METH UR: NORMAL
WBC # BLD AUTO: 9.81 K/UL (ref 3.9–12.7)

## 2020-06-06 PROCEDURE — 25000003 PHARM REV CODE 250: Performed by: NURSE PRACTITIONER

## 2020-06-06 PROCEDURE — 99900035 HC TECH TIME PER 15 MIN (STAT)

## 2020-06-06 PROCEDURE — 80053 COMPREHEN METABOLIC PANEL: CPT

## 2020-06-06 PROCEDURE — 99223 1ST HOSP IP/OBS HIGH 75: CPT | Mod: AI,,, | Performed by: PSYCHIATRY & NEUROLOGY

## 2020-06-06 PROCEDURE — 84439 ASSAY OF FREE THYROXINE: CPT

## 2020-06-06 PROCEDURE — 81003 URINALYSIS AUTO W/O SCOPE: CPT

## 2020-06-06 PROCEDURE — 85610 PROTHROMBIN TIME: CPT

## 2020-06-06 PROCEDURE — 85025 COMPLETE CBC W/AUTO DIFF WBC: CPT

## 2020-06-06 PROCEDURE — 82565 ASSAY OF CREATININE: CPT

## 2020-06-06 PROCEDURE — 84443 ASSAY THYROID STIM HORMONE: CPT

## 2020-06-06 PROCEDURE — 93010 EKG 12-LEAD: ICD-10-PCS | Mod: ,,, | Performed by: INTERNAL MEDICINE

## 2020-06-06 PROCEDURE — 80061 LIPID PANEL: CPT

## 2020-06-06 PROCEDURE — 93005 ELECTROCARDIOGRAM TRACING: CPT

## 2020-06-06 PROCEDURE — 99223 PR INITIAL HOSPITAL CARE,LEVL III: ICD-10-PCS | Mod: AI,,, | Performed by: PSYCHIATRY & NEUROLOGY

## 2020-06-06 PROCEDURE — 93010 ELECTROCARDIOGRAM REPORT: CPT | Mod: ,,, | Performed by: INTERNAL MEDICINE

## 2020-06-06 PROCEDURE — 99291 PR CRITICAL CARE, E/M 30-74 MINUTES: ICD-10-PCS | Mod: ,,, | Performed by: PHYSICIAN ASSISTANT

## 2020-06-06 PROCEDURE — 82962 GLUCOSE BLOOD TEST: CPT

## 2020-06-06 PROCEDURE — 99291 CRITICAL CARE FIRST HOUR: CPT | Mod: 25

## 2020-06-06 PROCEDURE — 11000001 HC ACUTE MED/SURG PRIVATE ROOM

## 2020-06-06 PROCEDURE — 99291 CRITICAL CARE FIRST HOUR: CPT | Mod: ,,, | Performed by: PHYSICIAN ASSISTANT

## 2020-06-06 RX ORDER — SODIUM CHLORIDE 0.9 % (FLUSH) 0.9 %
10 SYRINGE (ML) INJECTION
Status: DISCONTINUED | OUTPATIENT
Start: 2020-06-06 | End: 2020-06-08 | Stop reason: HOSPADM

## 2020-06-06 RX ORDER — ATORVASTATIN CALCIUM 20 MG/1
40 TABLET, FILM COATED ORAL DAILY
Status: DISCONTINUED | OUTPATIENT
Start: 2020-06-06 | End: 2020-06-08 | Stop reason: HOSPADM

## 2020-06-06 RX ORDER — ASPIRIN 81 MG/1
81 TABLET ORAL DAILY
Status: DISCONTINUED | OUTPATIENT
Start: 2020-06-06 | End: 2020-06-08 | Stop reason: HOSPADM

## 2020-06-06 RX ORDER — ACETAMINOPHEN 325 MG/1
650 TABLET ORAL EVERY 6 HOURS PRN
Status: DISCONTINUED | OUTPATIENT
Start: 2020-06-06 | End: 2020-06-08 | Stop reason: HOSPADM

## 2020-06-06 RX ORDER — OLMESARTAN MEDOXOMIL 5 MG/1
20 TABLET ORAL ONCE
Status: DISCONTINUED | OUTPATIENT
Start: 2020-06-06 | End: 2020-06-08 | Stop reason: HOSPADM

## 2020-06-06 RX ORDER — CLOPIDOGREL BISULFATE 75 MG/1
75 TABLET ORAL DAILY
COMMUNITY

## 2020-06-06 RX ORDER — ASPIRIN 81 MG/1
81 TABLET ORAL DAILY
Status: DISCONTINUED | OUTPATIENT
Start: 2020-06-06 | End: 2020-06-06

## 2020-06-06 RX ADMIN — ACETAMINOPHEN 650 MG: 325 TABLET ORAL at 07:06

## 2020-06-06 RX ADMIN — ASPIRIN 81 MG: 81 TABLET, COATED ORAL at 01:06

## 2020-06-06 RX ADMIN — ATORVASTATIN CALCIUM 40 MG: 20 TABLET, FILM COATED ORAL at 01:06

## 2020-06-06 NOTE — TELEPHONE ENCOUNTER
Spoke with pt and pt verified first and last names by spelling them and verified . Permission granted to speak with pt's daughter. Pt's daughter inquires if [facility] is able to call and see if their is a neurologist on call that could come to the hospital and meet them there due to her father having a stroke on May 25, 2020 as well as today. Daughter states that pt was at [facility] and that they performed an MRI of the brain and sent pt home while still having stroke symptoms because they didn't have a neurologist that would see pt until Monday and pt refused to just sit in hospital all weekend long. Daughter states that pt is currently having slurred speech and that his vision continues to deteriorate. Care advice provided and instructed daughter that pt is having a medical emergency and should go to the ED now; daughter states that she is unsure if pt will go to the ED tonight. Advised daughter that pt could be having another stroke or could have a major stroke soon and that waiting could be fatal or result in a dismal outcome; daughter voiced verbal understanding and agrees with instructions and states that she'll do her best to get him to go to the ED tonight.     Reason for Disposition   General information question, no triage required and triager able to answer question    Additional Information   Negative: [1] Caller is not with the adult (patient) AND [2] reporting urgent symptoms   Negative: Lab result questions   Negative: Medication questions   Negative: Caller can't be reached by phone   Negative: Caller has already spoken to PCP or another triager   Negative: RN needs further essential information from caller in order to complete triage   Negative: Requesting regular office appointment   Negative: [1] Caller requesting NON-URGENT health information AND [2] PCP's office is the best resource   Negative: Health Information question, no triage required and triager able to answer  question    Protocols used: INFORMATION ONLY CALL-A-AH

## 2020-06-06 NOTE — SUBJECTIVE & OBJECTIVE
Past Medical History:   Diagnosis Date    Coronary artery disease     Diabetes mellitus     pre-diabetes    Hypertension      Past Surgical History:   Procedure Laterality Date    ARTHROSCOPIC REPAIR OF ROTATOR CUFF OF SHOULDER Right 8/20/2019    Procedure: REPAIR, ROTATOR CUFF, ARTHROSCOPIC;  Surgeon: Nj You MD;  Location: [facility];  Service: Orthopedics;  Laterality: Right;    ARTHROSCOPY OF SHOULDER WITH DECOMPRESSION OF SUBACROMIAL SPACE Right 8/20/2019    Procedure: ARTHROSCOPY, SHOULDER, WITH SUBACROMIAL SPACE DECOMPRESSION;  Surgeon: Nj You MD;  Location: [facility] OR;  Service: Orthopedics;  Laterality: Right;  WITH BLOCK    CHONDROPLASTY OF SHOULDER Right 8/20/2019    Procedure: CHONDROPLASTY, SHOULDER;  Surgeon: Nj You MD;  Location: [facility];  Service: Orthopedics;  Laterality: Right;    CORONARY STENT PLACEMENT      EYE SURGERY      HAND AMPUTATION THROUGH METACARPAL      right    HEMORRHOID SURGERY      HERNIA REPAIR      SHOULDER SURGERY      right     No family history on file.  Social History     Tobacco Use    Smoking status: Former Smoker   Substance Use Topics    Alcohol use: Never     Frequency: Never    Drug use: Not on file     Review of patient's allergies indicates:  No Known Allergies    Medications: I have reviewed the current medication administration record.      (Not in a hospital admission)    Review of Systems   Constitutional: Negative for fever.   HENT: Negative for trouble swallowing.    Eyes: Positive for visual disturbance.   Respiratory: Negative for shortness of breath and wheezing.    Cardiovascular: Negative for chest pain and palpitations.   Gastrointestinal: Negative for nausea and vomiting.   Genitourinary: Negative for difficulty urinating.   Musculoskeletal: Positive for gait problem.   Skin: Negative for color change and pallor.   Allergic/Immunologic: Negative for immunocompromised state.   Neurological: Positive for speech  difficulty. Negative for facial asymmetry and weakness.   Psychiatric/Behavioral: Negative for confusion and decreased concentration.     Objective:     Vital Signs (Most Recent):  Temp: 98.7 °F (37.1 °C) (06/06/20 0833)  Pulse: 64 (06/06/20 1001)  Resp: 18 (06/06/20 0833)  BP: (!) 168/74 (06/06/20 1001)  SpO2: 95 % (06/06/20 1000)    Vital Signs Range (Last 24H):  Temp:  [98.7 °F (37.1 °C)]   Pulse:  [64-68]   Resp:  [18]   BP: (166-176)/(74-86)   SpO2:  [95 %-96 %]     Physical Exam   Constitutional: He appears well-developed and well-nourished.   HENT:   Head: Normocephalic and atraumatic.   Eyes: Pupils are equal, round, and reactive to light. EOM are normal.   Neck: Normal range of motion. Neck supple.   Cardiovascular: Normal rate.   Pulmonary/Chest: Effort normal.   Abdominal: Soft.   Musculoskeletal: Normal range of motion.   Neurological: He is alert.   Skin: Skin is warm and dry.   Psychiatric: He has a normal mood and affect. His behavior is normal. His speech is slurred.       Neurological Exam:   LOC: alert  Attention Span: Good   Language: No aphasia  Articulation: Dysarthria  Orientation: Person, Place, Time   Visual Fields: right homonymous hemianopia  EOM (CN III, IV, VI): Full/intact  Pupils (CN II, III): PERRL  Facial Sensation (CN V): Normal  Facial Movement (CN VII): Symmetric facial expression    Motor: Arm left  Normal 5/5  Leg left  Normal 5/5  Arm right  Normal 5/5  Leg right Normal 5/5  Cebellar: Lower Extremity Appendicular Ataxia (Heel to Shin)  Left  Sensation: Intact to light touch, temperature and vibration  Tone: Normal tone throughout      Laboratory:  CMP:   Recent Labs   Lab 06/06/20  0856   CALCIUM 9.5   ALBUMIN 3.8   PROT 7.7      K 3.9   CO2 26      BUN 11   CREATININE 0.8   ALKPHOS 64   ALT 40   AST 29   BILITOT 0.4     CBC:   Recent Labs   Lab 06/06/20  0856   WBC 9.81   RBC 5.13   HGB 13.1*   HCT 42.9      MCV 84   MCH 25.5*   MCHC 30.5*     Lipid Panel:    Recent Labs   Lab 06/06/20  0859   CHOL 129   LDLCALC 58.2*   HDL 50   TRIG 104     Coagulation:   Recent Labs   Lab 06/06/20  0859   INR 1.0     Hgb A1C:   Recent Labs   Lab 06/05/20  0024   HGBA1C 7.0*     TSH: No results for input(s): TSH in the last 168 hours.    Diagnostic Results:      Brain imaging:      MRI brain pending     MRI 6/4/20 ([facility])    Acute ischemia within the right cerebellar hemisphere, which was also seen on the comparison CT scan performed earlier today. This is a known ischemic infarct as reported on the CT scan earlier. There is no significant mass effect and no hemorrhagic conversion of the acute ischemia is seen.    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    A previously seen small focus of ischemia within the left aspect of the midbrain has resolved with no evidence of a lacunar infarct in this area on today's examination. An additional previously suspected lacunar infarct within the left aspect of the guzman is not seen and this was likely artifactual in nature    Vessel Imaging:    MRA head and neck pending     Cardiac Evaluation:

## 2020-06-06 NOTE — ED PROVIDER NOTES
"Encounter Date: 6/6/2020       History     Chief Complaint   Patient presents with    Loss of Vision     Half of left eye vision completely dark, and complains of speech problems States has had two strokes in the past 3 weeks      Mr Rodriguez is a 65yoM who presents for progressed vision changes; pertinent PMHx CAD, DM, HTN. Patient initially presented on 06/04 at [facility] for near syncope while cutting grass, associated with new left lateral hemianopsia, mild dysarthric speech and mild left facial droop; "CT shows cytotoxic edema within the superior right cerebellar hemisphere consistent with developing ischemia. An MRI was done which corroborated this finding. The patient was continued on aspirin Plavix and atorvastatin. Does recommended that he be seen by Neurology; however, prior to being seen by Neuro the patient decided to leave AMA 06/05 and concern for a coming storm and his wife being home alone."  Patient presents today for continued symptoms of diagnosed stroke, with new "vision changes to my right eye" since leaving. Denies fever, N/V, confusion. Wife reports continued mildly dysarthric speech with "improvement in his walking compared to prior admission".    The patients available PMH, PSH, Social History, medications, allergies, and triage vital signs were reviewed just prior to their medical evaluation.  A ten point review of systems was completed and is negative except as documented above.  Patient denies any other acute medical complaint.    Please be advised this text was dictated with Rawbots software and may contain errors due to translation.           Review of patient's allergies indicates:  No Known Allergies  Past Medical History:   Diagnosis Date    Coronary artery disease     Diabetes mellitus     pre-diabetes    Hypertension      Past Surgical History:   Procedure Laterality Date    ARTHROSCOPIC REPAIR OF ROTATOR CUFF OF SHOULDER Right 8/20/2019    Procedure: REPAIR, ROTATOR CUFF, " ARTHROSCOPIC;  Surgeon: Nj You MD;  Location: [facility];  Service: Orthopedics;  Laterality: Right;    ARTHROSCOPY OF SHOULDER WITH DECOMPRESSION OF SUBACROMIAL SPACE Right 8/20/2019    Procedure: ARTHROSCOPY, SHOULDER, WITH SUBACROMIAL SPACE DECOMPRESSION;  Surgeon: Nj You MD;  Location: [facility];  Service: Orthopedics;  Laterality: Right;  WITH BLOCK    CHONDROPLASTY OF SHOULDER Right 8/20/2019    Procedure: CHONDROPLASTY, SHOULDER;  Surgeon: Nj You MD;  Location: [facility];  Service: Orthopedics;  Laterality: Right;    CORONARY STENT PLACEMENT      EYE SURGERY      HAND AMPUTATION THROUGH METACARPAL      right    HEMORRHOID SURGERY      HERNIA REPAIR      SHOULDER SURGERY      right     No family history on file.  Social History     Tobacco Use    Smoking status: Former Smoker   Substance Use Topics    Alcohol use: Never     Frequency: Never    Drug use: Not on file     Review of Systems   Constitutional: Negative for chills and fever.   Eyes: Positive for visual disturbance. Negative for photophobia and pain.   Respiratory: Negative for shortness of breath.    Cardiovascular: Negative for chest pain.   Gastrointestinal: Negative for abdominal pain and nausea.   Genitourinary: Negative for dysuria and hematuria.   Musculoskeletal: Negative for neck pain and neck stiffness.   Skin: Negative for rash.   Neurological: Positive for speech difficulty (per wife). Negative for dizziness, weakness (denies), numbness and headaches.   Psychiatric/Behavioral: Negative for confusion.       Physical Exam     Initial Vitals [06/06/20 0833]   BP Pulse Resp Temp SpO2   (!) 166/81 67 18 98.7 °F (37.1 °C) 96 %      MAP       --         Physical Exam    Vitals reviewed.  Constitutional: He appears well-developed and well-nourished. He is not diaphoretic. No distress.   HENT:   Head: Normocephalic and atraumatic.   Right Ear: External ear normal.   Left Ear: External ear normal.   Mouth/Throat:  Oropharynx is clear and moist. No oropharyngeal exudate.   Eyes: Conjunctivae and EOM are normal. Pupils are equal, round, and reactive to light. No scleral icterus.   Cardiovascular: Normal rate, regular rhythm and intact distal pulses.   Pulmonary/Chest: No respiratory distress.   Abdominal: Soft. Bowel sounds are normal. There is no tenderness.   Neurological: He is alert and oriented to person, place, and time. He has normal strength. No cranial nerve deficit or sensory deficit.   +R nasal hemianopsia, new per patient  Mild dysarthric speech per wife yesterday, continued into today    Continued left lateral hemianopsia  Romberg negative, remainder of neuro exam intact     Skin: Skin is warm and dry. Capillary refill takes less than 2 seconds. No rash noted. No erythema. No pallor.   Psychiatric: He has a normal mood and affect. His behavior is normal. Judgment and thought content normal.         ED Course   Procedures  Labs Reviewed   CBC W/ AUTO DIFFERENTIAL - Abnormal; Notable for the following components:       Result Value    Hemoglobin 13.1 (*)     Mean Corpuscular Hemoglobin 25.5 (*)     Mean Corpuscular Hemoglobin Conc 30.5 (*)     RDW 15.0 (*)     Mono # 1.2 (*)     All other components within normal limits   LIPID PANEL - Abnormal; Notable for the following components:    LDL Cholesterol 58.2 (*)     All other components within normal limits   TSH - Abnormal; Notable for the following components:    TSH 0.029 (*)     All other components within normal limits    Narrative:     ADD ON TSH AS PER DR. BOO GRANADOS 13:22  06/06/2020    COMPREHENSIVE METABOLIC PANEL   PROTIME-INR   TSH   T4, FREE    Narrative:     ADD ON TSH AS PER DR. BOO GRANADOS 13:22  06/06/2020    URINALYSIS, REFLEX TO URINE CULTURE   POCT GLUCOSE, HAND-HELD DEVICE   POCT GLUCOSE   ISTAT PROCEDURE   ISTAT CREATININE        ECG Results          ECG 12 lead (In process)  Result time 06/06/20 09:58:18    In process by Interface, Lab In  [facility] (06/06/20 09:58:18)                 Narrative:    Test Reason : I63.9,    Vent. Rate : 069 BPM     Atrial Rate : 069 BPM     P-R Int : 178 ms          QRS Dur : 078 ms      QT Int : 396 ms       P-R-T Axes : 030 017 048 degrees     QTc Int : 424 ms    Normal sinus rhythm  Nonspecific T wave abnormality  Abnormal ECG  No previous ECGs available    Referred By: System System           Confirmed By:                             Imaging Results          MRA Neck without contrast (Final result)  Result time 06/06/20 11:27:29    Final result by Mario Alvarez DO (06/06/20 11:27:29)                 Impression:      MRA head: Unremarkable MRA of the head specifically without evidence for focal stenosis or proximal occlusion.    MRA neck: Irregularity of the carotid bifurcation and proximal ICAs although distorted by motion concerning for atherosclerotic disease with overall less than 50% proximal ICA stenosis by NASCET criteria.    Otherwise motion distort examination as detailed above.      Electronically signed by: Mario Alvarez DO  Date:    06/06/2020  Time:    11:27             Narrative:    EXAMINATION:  MRA BRAIN WITHOUT CONTRAST; MRA NECK WITHOUT CONTRAST    CLINICAL HISTORY:  Stroke;    TECHNIQUE:  noncontrast 3-D time of flight MRA head and noncontrast 2-D and 3D  time-of-flight MRA neck.    COMPARISON:  None    FINDINGS:  MRA head:    Anterior circulation:  The bilateral distal cervical, Rae, cavernous and supraclinoid segments of the ICA's and the visualized bilateral anterior and middle cerebral arteries are patent without evidence for significant focal stenosis or aneurysm.    Posterior circulation: The distal vertebral arteries, basilar artery and posterior cerebral arteries are patent without evidence for significant focal stenosis or aneurysm.    MRA neck: Study is significantly distorted by patient motion.  Allowing for distortion the origins of the right brachycephalic,  left common carotid and  left subclavian arteries from the arch are within normal limits. The origins of the vertebral arteries from the subclavian arteries are also distorted by motion..  Allowing for this the visualized vertebral arteries are grossly patent throughout their course.    The bilateral common carotid arteries, carotid bifurcations and extracranial portions of the internal carotid arteries are patent although distorted by motion allowing for motion artifacts there is irregular of the carotid bifurcations and proximal ICAs with less than 50% proximal ICA stenosis.    .                                MRI Brain Without Contrast (Final result)  Result time 06/06/20 11:24:26    Final result by Mario Alvarez DO (06/06/20 11:24:26)                 Impression:      Moderate-sized signal abnormality right superior cerebellum most compatible with recent infarction.  No significant mass effect or hydrocephalus.    Moderate signal abnormality right parasagittal occipital lobe with susceptibility and T1 shortening along the cortex and underlying vasogenic edema concerning for subacute age cortical hemorrhage and vasogenic edema, however underlying hemorrhagic mass cannot be excluded.  Clinical correlation and follow-up advised.  This could be further characterized with postcontrast imaging as well as CT.  Comparison with prior imaging may be of further diagnostic value.    Moderate patchy opacities in the paranasal sinuses.      Electronically signed by: Mario Alvarez DO  Date:    06/06/2020  Time:    11:24             Narrative:    EXAMINATION:  MRI BRAIN WITHOUT CONTRAST    CLINICAL HISTORY:  Stroke;    TECHNIQUE:  Sagittal and axial T1, axial T2, axial FLAIR, axial gradient and axial diffusion imaging of the whole brain without contrast.    COMPARISON:  None    FINDINGS:  There is a moderate size region of restricted diffusion within the right cerebellum superiorly with corresponding T2 flair signal hyperintensity concerning for  acute/recent infarction.    There is focal area of cortical susceptibility involving the right parasagittal occipital cortex with underlying T2 flair signal hyperintensity.  There is T1 hyperintensity associated with the areas of susceptibility concerning for subacute cortical hemorrhage and underlying vasogenic edema.  Underlying hemorrhagic mass lesion not excluded.  There is no restricted diffusion is region to suggest acute or recent infarction.  Clinical correlation and further characterization with CT and follow-up recommended.  Ventricles are normal in size without hydrocephalus.  Major intracranial T2 flow voids are present.  There is heterogeneous attenuation of the inferior frontal calvarium with moderate to severe opacification the visualized paranasal sinuses.  Overall evaluation limited by lack of contrast.    This report was flagged in Epic as abnormal.                               MRA Brain without contrast (Final result)  Result time 06/06/20 11:27:29    Final result by Mario Alvarez DO (06/06/20 11:27:29)                 Impression:      MRA head: Unremarkable MRA of the head specifically without evidence for focal stenosis or proximal occlusion.    MRA neck: Irregularity of the carotid bifurcation and proximal ICAs although distorted by motion concerning for atherosclerotic disease with overall less than 50% proximal ICA stenosis by NASCET criteria.    Otherwise motion distort examination as detailed above.      Electronically signed by: Mario Alvarez DO  Date:    06/06/2020  Time:    11:27             Narrative:    EXAMINATION:  MRA BRAIN WITHOUT CONTRAST; MRA NECK WITHOUT CONTRAST    CLINICAL HISTORY:  Stroke;    TECHNIQUE:  noncontrast 3-D time of flight MRA head and noncontrast 2-D and 3D  time-of-flight MRA neck.    COMPARISON:  None    FINDINGS:  MRA head:    Anterior circulation:  The bilateral distal cervical, Rae, cavernous and supraclinoid segments of the ICA's and the visualized  bilateral anterior and middle cerebral arteries are patent without evidence for significant focal stenosis or aneurysm.    Posterior circulation: The distal vertebral arteries, basilar artery and posterior cerebral arteries are patent without evidence for significant focal stenosis or aneurysm.    MRA neck: Study is significantly distorted by patient motion.  Allowing for distortion the origins of the right brachycephalic,  left common carotid and left subclavian arteries from the arch are within normal limits. The origins of the vertebral arteries from the subclavian arteries are also distorted by motion..  Allowing for this the visualized vertebral arteries are grossly patent throughout their course.    The bilateral common carotid arteries, carotid bifurcations and extracranial portions of the internal carotid arteries are patent although distorted by motion allowing for motion artifacts there is irregular of the carotid bifurcations and proximal ICAs with less than 50% proximal ICA stenosis.    .                                 Medical Decision Making:   History:   I obtained history from: someone other than patient.       <> Summary of History: Wife at bedside  Old Medical Records: I decided to obtain old medical records.  Old Records Summarized: records from clinic visits and records from previous admission(s).  Initial Assessment:   Patient with recent stroke diagnosis presents for new partial right visual loss (homonymous hemianopsia), VSS, afebrile  Differential Diagnosis:   DDx includes new versus established stroke, less likely retinal detachment. Physical exam and history taking lower clinical suspicion for meningitis, encephalitis, acute artery dissection, CRAO, CRVO.  Clinical Tests:   Lab Tests: Ordered and Reviewed  Radiological Study: Ordered and Reviewed  Medical Tests: Ordered and Reviewed  ED Management:  Stroke code called by myself on arrival to room, based on new right nasal hemianopsia  reported by patient since discharge yesterday. Unchanged left lateral hemianopsia and mild dysarthric speech per wife at bedside. No facial droop noted.  Update: [facility] neuro has seen patient at bedside, appreciate imaging recs. VSS, proceed to MRI.  EKG without acute ischemic changes, NSR, normal intervals, normal axis.  Labs are unremarkable.  Update:  MRI demonstrates acute infarct of right superior cerebellum without mass effect, in addition to subacute cortical hemorrhage of right parasagittal occipital lobe.  Discussed findings with stroke team who will admit to their service. Patient agreed to plan of care and voiced understanding.    Astrid Alvarez PA-C  06/06/2020    I discussed the following case, diagnosis and plan of care with attending physician.    Other:   I have discussed this case with another health care provider.              Attending Attestation:     Physician Attestation Statement for NP/PA:   I have conducted a face to face encounter with this patient in addition to the NP/PA, due to Medical Complexity    Other NP/PA Attestation Additions:    History of Present Illness: Patient with worsening vision loss   Physical Exam: Bilateral vision cut   Medical Decision Making: Concerning for stroke  CT revealed stroke as well as small hemorrhage     Attending Critical Care:   Critical Care Times:   Direct Patient Care (initial evaluation, reassessments, and time considering the case)................................................................22 minutes.   Additional History from reviewing old medical records or taking additional history from the family, EMS, PCP, etc.......................3 minutes.   Ordering, Reviewing, and Interpreting Diagnostic Studies...............................................................................................................3 minutes.    Documentation..................................................................................................................................................................................3 minutes.   Consultation with other Physicians. .................................................................................................................................................3 minutes.   ==============================================================  · Total Critical Care Time - exclusive of procedural time: 34 minutes.  ==============================================================                            Clinical Impression:       ICD-10-CM ICD-9-CM   1. Homonymous hemianopsia following cerebrovascular accident I69.398 438.7    H53.469 368.46   2. Stroke I63.9 434.91   3. Cerebellar stroke I63.9 434.91         Disposition:   Disposition: Admitted  Condition: Serious     ED Disposition Condition    Admit                           Astrid Alvarez PA-C  06/06/20 1430       Ten Webb MD  06/07/20 0822

## 2020-06-06 NOTE — CONSULTS
[facility]  Vascular Neurology  Comprehensive Stroke Center  Consult Note    Inpatient consult to Vascular (Stroke) Neurology  Consult performed by: Belinda Arreguin NP  Consult ordered by: Astrid Alvarez PA-C        Assessment/Plan:     Patient is a 65 y.o. year old male with visual deficits:    * Embolic stroke involving right cerebellar artery  65 y.o. yo male with PMHx  who presented to OSH on 6/4 with left lateral hemianopia, dysarthria, and left facial droop. OSH MRI revealed acute right cerebellar infarct. Patient left before being seen by neurologist. On 6/6 patient presented to [facility] [place] ED with newly noted right hemianopia. tPA not given as patient has had recent stroke. Will admit to Vascular Neurology for work up.      Antithrombotics for secondary stroke prevention: Antiplatelets: Aspirin: 81 mg daily  Clopidogrel: 75 mg daily    Statins for secondary stroke prevention and hyperlipidemia, if present:   Statins: Atorvastatin- 40 mg daily    Aggressive risk factor modification: HTN, DM, CAD     Rehab efforts: The patient has been evaluated by a stroke team provider and the therapy needs have been fully considered based off the presenting complaints and exam findings. The following therapy evaluations are needed: PT evaluate and treat, OT evaluate and treat, SLP evaluate and treat, PM&R evaluate for appropriate placement    Diagnostics ordered/pending: HgbA1C to assess blood glucose levels, Lipid Profile to assess cholesterol levels, MRA head to assess vasculature, MRA neck/arch to assess vasculature, MRI head without contrast to assess brain parenchyma, TTE to assess cardiac function/status , TSH to assess thyroid function    VTE prophylaxis: Heparin 5000 units SQ every 8 hours    BP parameters: Infarct: No intervention, SBP <220        Hyperlipemia  -stroke risk factor    LDL 58.2  Continue home Lipitor     Essential (primary) hypertension  -stroke risk factor    Keep SBP <220 in  setting of acute stroke       STROKE DOCUMENTATION     Acute Stroke Times   Symptom Onset Date: 06/04/20    NIH Scale:  1a. Level of Consciousness: 0-->Alert, keenly responsive  1b. LOC Questions: 0-->Answers both questions correctly  1c. LOC Commands: 0-->Performs both tasks correctly  2. Best Gaze: 0-->Normal  3. Visual: 2-->Complete hemianopia  4. Facial Palsy: 0-->Normal symmetrical movements  5a. Motor Arm, Left: 0-->No drift, limb holds 90 (or 45) degrees for full 10 secs  5b. Motor Arm, Right: 0-->No drift, limb holds 90 (or 45) degrees for full 10 secs  6a. Motor Leg, Left: 0-->No drift, leg holds 30 degree position for full 5 secs  6b. Motor Leg, Right: 0-->No drift, leg holds 30 degree position for full 5 secs  7. Limb Ataxia: 1-->Present in one limb  8. Sensory: 0-->Normal, no sensory loss  9. Best Language: 0-->No aphasia, normal  10. Dysarthria: 1-->Mild-to-moderate dysarthria, patient slurs at least some words and, at worst, can be understood with some difficulty  11. Extinction and Inattention (formerly Neglect): 0-->No abnormality  Total (NIH Stroke Scale): 4    Modified Rainer Score: 1  Franklin Coma Scale:15   ABCD2 Score:    [id number]-EON Score:   HAS -BLED Score:   ICH Score:   Hunt & Mooney Classification:       Thrombolysis Candidate? No, Recent mild stroke ( <3 months) (mild stroke = NIHSS <5 and 1/3 or territory)     Delays to Thrombolysis?  No    Interventional Revascularization Candidate?   Is the patient eligible for mechanical endovascular reperfusion (EDUARDO)?  No; No large vessel occlusion      Hemorrhagic change of an Ischemic Stroke: Does this patient have an ischemic stroke with hemorrhagic changes? No     Subjective:     History of Present Illness:  Lorena Rodriguez is a 65 y.o. male with PMHx CAD, DM, HTN who is being evaluated by the Vascular Neurology service after developing worsening visual deficit. Pt was LKN at approximately 6/4. 6/4, patient had near syncopal event while cutting grass,  associated with left hemianopsia, mild dysarthria, and left facial droop. Patient presented to [facility]. MRI completed at OSH demonstrated acute right cerebellar infarct. Patient was continued on ASA, plavix, and Lipitor. Patient left AMA before being seen by neurologist. Today, patient noted right eye hemianopsia. Patient presented to [facility] ED for evaluation.     Pt is taking DAPT at home. He lives with others and performs all ADLs independently.         Past Medical History:   Diagnosis Date    Coronary artery disease     Diabetes mellitus     pre-diabetes    Hypertension      Past Surgical History:   Procedure Laterality Date    ARTHROSCOPIC REPAIR OF ROTATOR CUFF OF SHOULDER Right 8/20/2019    Procedure: REPAIR, ROTATOR CUFF, ARTHROSCOPIC;  Surgeon: Nj You MD;  Location: [facility] OR;  Service: Orthopedics;  Laterality: Right;    ARTHROSCOPY OF SHOULDER WITH DECOMPRESSION OF SUBACROMIAL SPACE Right 8/20/2019    Procedure: ARTHROSCOPY, SHOULDER, WITH SUBACROMIAL SPACE DECOMPRESSION;  Surgeon: Nj You MD;  Location: [facility] OR;  Service: Orthopedics;  Laterality: Right;  WITH BLOCK    CHONDROPLASTY OF SHOULDER Right 8/20/2019    Procedure: CHONDROPLASTY, SHOULDER;  Surgeon: Nj You MD;  Location: [facility];  Service: Orthopedics;  Laterality: Right;    CORONARY STENT PLACEMENT      EYE SURGERY      HAND AMPUTATION THROUGH METACARPAL      right    HEMORRHOID SURGERY      HERNIA REPAIR      SHOULDER SURGERY      right     No family history on file.  Social History     Tobacco Use    Smoking status: Former Smoker   Substance Use Topics    Alcohol use: Never     Frequency: Never    Drug use: Not on file     Review of patient's allergies indicates:  No Known Allergies    Medications: I have reviewed the current medication administration record.      (Not in a hospital admission)    Review of Systems   Constitutional: Negative for fever.   HENT: Negative for trouble  swallowing.    Eyes: Positive for visual disturbance.   Respiratory: Negative for shortness of breath and wheezing.    Cardiovascular: Negative for chest pain and palpitations.   Gastrointestinal: Negative for nausea and vomiting.   Genitourinary: Negative for difficulty urinating.   Musculoskeletal: Positive for gait problem.   Skin: Negative for color change and pallor.   Allergic/Immunologic: Negative for immunocompromised state.   Neurological: Positive for speech difficulty. Negative for facial asymmetry and weakness.   Psychiatric/Behavioral: Negative for confusion and decreased concentration.     Objective:     Vital Signs (Most Recent):  Temp: 98.7 °F (37.1 °C) (06/06/20 0833)  Pulse: 64 (06/06/20 1001)  Resp: 18 (06/06/20 0833)  BP: (!) 168/74 (06/06/20 1001)  SpO2: 95 % (06/06/20 1000)    Vital Signs Range (Last 24H):  Temp:  [98.7 °F (37.1 °C)]   Pulse:  [64-68]   Resp:  [18]   BP: (166-176)/(74-86)   SpO2:  [95 %-96 %]     Physical Exam   Constitutional: He appears well-developed and well-nourished.   HENT:   Head: Normocephalic and atraumatic.   Eyes: Pupils are equal, round, and reactive to light. EOM are normal.   Neck: Normal range of motion. Neck supple.   Cardiovascular: Normal rate.   Pulmonary/Chest: Effort normal.   Abdominal: Soft.   Musculoskeletal: Normal range of motion.   Neurological: He is alert.   Skin: Skin is warm and dry.   Psychiatric: He has a normal mood and affect. His behavior is normal. His speech is slurred.       Neurological Exam:   LOC: alert  Attention Span: Good   Language: No aphasia  Articulation: Dysarthria  Orientation: Person, Place, Time   Visual Fields: right homonymous hemianopia  EOM (CN III, IV, VI): Full/intact  Pupils (CN II, III): PERRL  Facial Sensation (CN V): Normal  Facial Movement (CN VII): Symmetric facial expression    Motor: Arm left  Normal 5/5  Leg left  Normal 5/5  Arm right  Normal 5/5  Leg right Normal 5/5  Cebellar: Lower Extremity Appendicular  Ataxia (Heel to [place])  Left  Sensation: Intact to light touch, temperature and vibration  Tone: Normal tone throughout      Laboratory:  CMP:   Recent Labs   Lab 06/06/20  0856   CALCIUM 9.5   ALBUMIN 3.8   PROT 7.7      K 3.9   CO2 26      BUN 11   CREATININE 0.8   ALKPHOS 64   ALT 40   AST 29   BILITOT 0.4     CBC:   Recent Labs   Lab 06/06/20  0856   WBC 9.81   RBC 5.13   HGB 13.1*   HCT 42.9      MCV 84   MCH 25.5*   MCHC 30.5*     Lipid Panel:   Recent Labs   Lab 06/06/20  0859   CHOL 129   LDLCALC 58.2*   HDL 50   TRIG 104     Coagulation:   Recent Labs   Lab 06/06/20  0859   INR 1.0     Hgb A1C:   Recent Labs   Lab 06/05/20  0024   HGBA1C 7.0*     TSH: No results for input(s): TSH in the last 168 hours.    Diagnostic Results:      Brain imaging:      MRI brain pending     MRI 6/4/20 (OSH)    Acute ischemia within the right cerebellar hemisphere, which was also seen on the comparison CT scan performed earlier today. This is a known ischemic infarct as reported on the CT scan earlier. There is no significant mass effect and no hemorrhagic conversion of the acute ischemia is seen.    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    A previously seen small focus of ischemia within the left aspect of the midbrain has resolved with no evidence of a lacunar infarct in this area on today's examination. An additional previously suspected lacunar infarct within the left aspect of the guzman is not seen and this was likely artifactual in nature    Vessel Imaging:    MRA head and neck pending     Cardiac Evaluation:         Belinda Arreguin NP  Comprehensive Stroke Center  Department of Vascular Neurology   [facility]

## 2020-06-06 NOTE — ASSESSMENT & PLAN NOTE
65 y.o. yo male with PMHx  who presented to OSH on 6/4 with left lateral hemianopia, dysarthria, and left facial droop. OSH MRI revealed acute right cerebellar infarct. Patient left before being seen by neurologist. On 6/6 patient presented to [facility] [place] ED with newly noted right hemianopia. tPA not given as patient has had recent stroke. Will admit to Vascular Neurology for work up.      Antithrombotics for secondary stroke prevention: Antiplatelets: Aspirin: 81 mg daily  Clopidogrel: 75 mg daily    Statins for secondary stroke prevention and hyperlipidemia, if present:   Statins: Atorvastatin- 40 mg daily    Aggressive risk factor modification: HTN, DM, CAD     Rehab efforts: The patient has been evaluated by a stroke team provider and the therapy needs have been fully considered based off the presenting complaints and exam findings. The following therapy evaluations are needed: PT evaluate and treat, OT evaluate and treat, SLP evaluate and treat, PM&R evaluate for appropriate placement    Diagnostics ordered/pending: HgbA1C to assess blood glucose levels, Lipid Profile to assess cholesterol levels, MRA head to assess vasculature, MRA neck/arch to assess vasculature, MRI head without contrast to assess brain parenchyma, TTE to assess cardiac function/status , TSH to assess thyroid function    VTE prophylaxis: Heparin 5000 units SQ every 8 hours    BP parameters: Infarct: No intervention, SBP <220

## 2020-06-06 NOTE — ED NOTES
Attempted to call report. Patient has not been assigned yet. CN asked if RN can call back in about 20 minutes.

## 2020-06-06 NOTE — NURSING
Pt was admitted from ED. AAOx4,pts wife at bedside VS WNL (ex of /84)Pt connected to VISI Pt turns and repositions independently. No skin breakdown noted. Pt pain and safety monitored q 1-2 hrs this shift . Bed locked and in lowest position. Rails elevated x 3. Brakes on. Call light and personal belongings in reach. Will continue monitor.

## 2020-06-06 NOTE — HPI
Lorena Rodriguez is a 65 y.o. male with PMHx CAD, DM, HTN who is being evaluated by the Vascular Neurology service after developing worsening visual deficit. Pt was LKN at approximately 6/4. 6/4, patient had near syncopal event while cutting grass, associated with left hemianopsia, mild dysarthria, and left facial droop. Patient presented to [facility]. MRI completed at OSH demonstrated acute right cerebellar infarct. Patient was continued on ASA, plavix, and Lipitor. Patient left AMA before being seen by neurologist. Today, patient noted right eye hemianopsia. Patient presented to [facility] [place] for evaluation.     Pt is taking DAPT at home. He lives with others and performs all ADLs independently.

## 2020-06-07 PROBLEM — G93.6 CYTOTOXIC CEREBRAL EDEMA: Status: ACTIVE | Noted: 2020-06-07

## 2020-06-07 LAB
ALBUMIN SERPL BCP-MCNC: 3.7 G/DL (ref 3.5–5.2)
ALP SERPL-CCNC: 65 U/L (ref 55–135)
ALT SERPL W/O P-5'-P-CCNC: 41 U/L (ref 10–44)
ANION GAP SERPL CALC-SCNC: 10 MMOL/L (ref 8–16)
APTT BLDCRRT: 25.2 SEC (ref 21–32)
ASCENDING AORTA: 4.11 CM
AST SERPL-CCNC: 31 U/L (ref 10–40)
AV INDEX (PROSTH): 0.99
AV MEAN GRADIENT: 4 MMHG
AV PEAK GRADIENT: 7 MMHG
AV VALVE AREA: 4.17 CM2
AV VELOCITY RATIO: 0.89
BASOPHILS # BLD AUTO: 0.06 K/UL (ref 0–0.2)
BASOPHILS NFR BLD: 0.6 % (ref 0–1.9)
BILIRUB SERPL-MCNC: 0.4 MG/DL (ref 0.1–1)
BSA FOR ECHO PROCEDURE: 2.36 M2
BUN SERPL-MCNC: 14 MG/DL (ref 8–23)
CALCIUM SERPL-MCNC: 9.8 MG/DL (ref 8.7–10.5)
CHLORIDE SERPL-SCNC: 102 MMOL/L (ref 95–110)
CK MB SERPL-MCNC: 2 NG/ML (ref 0.1–6.5)
CK MB SERPL-RTO: 0.8 % (ref 0–5)
CK SERPL-CCNC: 262 U/L (ref 20–200)
CO2 SERPL-SCNC: 27 MMOL/L (ref 23–29)
CREAT SERPL-MCNC: 0.9 MG/DL (ref 0.5–1.4)
CV ECHO LV RWT: 0.4 CM
DIFFERENTIAL METHOD: ABNORMAL
DOP CALC AO PEAK VEL: 1.31 M/S
DOP CALC AO VTI: 27.75 CM
DOP CALC LVOT AREA: 4.2 CM2
DOP CALC LVOT DIAMETER: 2.31 CM
DOP CALC LVOT PEAK VEL: 1.16 M/S
DOP CALC LVOT STROKE VOLUME: 115.65 CM3
DOP CALCLVOT PEAK VEL VTI: 27.61 CM
E WAVE DECELERATION TIME: 267.69 MSEC
E/A RATIO: 0.74
E/E' RATIO: 12 M/S
ECHO LV POSTERIOR WALL: 1.03 CM (ref 0.6–1.1)
EOSINOPHIL # BLD AUTO: 0.3 K/UL (ref 0–0.5)
EOSINOPHIL NFR BLD: 3.4 % (ref 0–8)
ERYTHROCYTE [DISTWIDTH] IN BLOOD BY AUTOMATED COUNT: 14.8 % (ref 11.5–14.5)
EST. GFR  (AFRICAN AMERICAN): >60 ML/MIN/1.73 M^2
EST. GFR  (NON AFRICAN AMERICAN): >60 ML/MIN/1.73 M^2
FRACTIONAL SHORTENING: 36 % (ref 28–44)
GLUCOSE SERPL-MCNC: 84 MG/DL (ref 70–110)
HCT VFR BLD AUTO: 46.2 % (ref 40–54)
HGB BLD-MCNC: 14 G/DL (ref 14–18)
IMM GRANULOCYTES # BLD AUTO: 0.03 K/UL (ref 0–0.04)
IMM GRANULOCYTES NFR BLD AUTO: 0.3 % (ref 0–0.5)
INR PPP: 1 (ref 0.8–1.2)
INTERVENTRICULAR SEPTUM: 0.87 CM (ref 0.6–1.1)
IVRT: 85.63 MSEC
LA MAJOR: 5.4 CM
LA MINOR: 5.5 CM
LA WIDTH: 3.32 CM
LEFT ATRIUM SIZE: 4.2 CM
LEFT ATRIUM VOLUME INDEX: 28.4 ML/M2
LEFT ATRIUM VOLUME: 64.59 CM3
LEFT INTERNAL DIMENSION IN SYSTOLE: 3.33 CM (ref 2.1–4)
LEFT VENTRICLE DIASTOLIC VOLUME INDEX: 57.17 ML/M2
LEFT VENTRICLE DIASTOLIC VOLUME: 130 ML
LEFT VENTRICLE MASS INDEX: 80 G/M2
LEFT VENTRICLE SYSTOLIC VOLUME INDEX: 19.9 ML/M2
LEFT VENTRICLE SYSTOLIC VOLUME: 45.27 ML
LEFT VENTRICULAR INTERNAL DIMENSION IN DIASTOLE: 5.21 CM (ref 3.5–6)
LEFT VENTRICULAR MASS: 181.98 G
LV LATERAL E/E' RATIO: 14 M/S
LV SEPTAL E/E' RATIO: 10.5 M/S
LYMPHOCYTES # BLD AUTO: 2.8 K/UL (ref 1–4.8)
LYMPHOCYTES NFR BLD: 27.7 % (ref 18–48)
MAGNESIUM SERPL-MCNC: 2 MG/DL (ref 1.6–2.6)
MCH RBC QN AUTO: 25.4 PG (ref 27–31)
MCHC RBC AUTO-ENTMCNC: 30.3 G/DL (ref 32–36)
MCV RBC AUTO: 84 FL (ref 82–98)
MONOCYTES # BLD AUTO: 1.3 K/UL (ref 0.3–1)
MONOCYTES NFR BLD: 12.7 % (ref 4–15)
MV PEAK A VEL: 1.13 M/S
MV PEAK E VEL: 0.84 M/S
NEUTROPHILS # BLD AUTO: 5.6 K/UL (ref 1.8–7.7)
NEUTROPHILS NFR BLD: 55.3 % (ref 38–73)
NRBC BLD-RTO: 0 /100 WBC
PHOSPHATE SERPL-MCNC: 4.6 MG/DL (ref 2.7–4.5)
PLATELET # BLD AUTO: 248 K/UL (ref 150–350)
PMV BLD AUTO: 9.5 FL (ref 9.2–12.9)
POTASSIUM SERPL-SCNC: 4 MMOL/L (ref 3.5–5.1)
PROT SERPL-MCNC: 7.6 G/DL (ref 6–8.4)
PROTHROMBIN TIME: 10.4 SEC (ref 9–12.5)
RA MAJOR: 4.51 CM
RA PRESSURE: 3 MMHG
RA WIDTH: 3.82 CM
RBC # BLD AUTO: 5.51 M/UL (ref 4.6–6.2)
RIGHT VENTRICULAR END-DIASTOLIC DIMENSION: 3 CM
RV TISSUE DOPPLER FREE WALL SYSTOLIC VELOCITY 1 (APICAL 4 CHAMBER VIEW): 17.62 CM/S
SINUS: 4.04 CM
SODIUM SERPL-SCNC: 139 MMOL/L (ref 136–145)
STJ: 3.51 CM
TDI LATERAL: 0.06 M/S
TDI SEPTAL: 0.08 M/S
TDI: 0.07 M/S
TRICUSPID ANNULAR PLANE SYSTOLIC EXCURSION: 2.64 CM
TROPONIN I SERPL DL<=0.01 NG/ML-MCNC: <0.006 NG/ML (ref 0–0.03)
WBC # BLD AUTO: 10.08 K/UL (ref 3.9–12.7)

## 2020-06-07 PROCEDURE — 99233 PR SUBSEQUENT HOSPITAL CARE,LEVL III: ICD-10-PCS | Mod: ,,, | Performed by: PSYCHIATRY & NEUROLOGY

## 2020-06-07 PROCEDURE — 97129 THER IVNTJ 1ST 15 MIN: CPT

## 2020-06-07 PROCEDURE — 92610 EVALUATE SWALLOWING FUNCTION: CPT

## 2020-06-07 PROCEDURE — 85730 THROMBOPLASTIN TIME PARTIAL: CPT

## 2020-06-07 PROCEDURE — 83735 ASSAY OF MAGNESIUM: CPT

## 2020-06-07 PROCEDURE — 82553 CREATINE MB FRACTION: CPT

## 2020-06-07 PROCEDURE — 82550 ASSAY OF CK (CPK): CPT

## 2020-06-07 PROCEDURE — 97802 MEDICAL NUTRITION INDIV IN: CPT

## 2020-06-07 PROCEDURE — 85610 PROTHROMBIN TIME: CPT

## 2020-06-07 PROCEDURE — 85025 COMPLETE CBC W/AUTO DIFF WBC: CPT

## 2020-06-07 PROCEDURE — 36415 COLL VENOUS BLD VENIPUNCTURE: CPT

## 2020-06-07 PROCEDURE — 84100 ASSAY OF PHOSPHORUS: CPT

## 2020-06-07 PROCEDURE — 11000001 HC ACUTE MED/SURG PRIVATE ROOM

## 2020-06-07 PROCEDURE — 84484 ASSAY OF TROPONIN QUANT: CPT

## 2020-06-07 PROCEDURE — 97165 OT EVAL LOW COMPLEX 30 MIN: CPT

## 2020-06-07 PROCEDURE — 25000003 PHARM REV CODE 250: Performed by: NURSE PRACTITIONER

## 2020-06-07 PROCEDURE — 99233 SBSQ HOSP IP/OBS HIGH 50: CPT | Mod: ,,, | Performed by: PSYCHIATRY & NEUROLOGY

## 2020-06-07 PROCEDURE — 80053 COMPREHEN METABOLIC PANEL: CPT

## 2020-06-07 RX ORDER — CLOPIDOGREL BISULFATE 75 MG/1
75 TABLET ORAL DAILY
Status: DISCONTINUED | OUTPATIENT
Start: 2020-06-07 | End: 2020-06-08 | Stop reason: HOSPADM

## 2020-06-07 RX ADMIN — ASPIRIN 81 MG: 81 TABLET, COATED ORAL at 09:06

## 2020-06-07 RX ADMIN — CLOPIDOGREL BISULFATE 75 MG: 75 TABLET ORAL at 12:06

## 2020-06-07 RX ADMIN — ATORVASTATIN CALCIUM 40 MG: 20 TABLET, FILM COATED ORAL at 09:06

## 2020-06-07 NOTE — HOSPITAL COURSE
Mr. Lorena Rodriguez was admitted to Vascular Neurology for acute stroke workup. Stroke etiology suspected to be ESUS at this time. [Please see plan section below for further details.]  Patient discharged with recommendations for outpatient therapy. Family at bedside amenable to plan.     Patient with improvement in stroke symptoms since admission. Inpatient acute stroke work up completed and patient stable for discharge. Please see all appropriate medication changes, imaging results, and necessary follow-up below.

## 2020-06-07 NOTE — PLAN OF CARE
Emilie aaox3, amb, contx2, vss, POC explained patient verbalize understanding will cont to monitor.

## 2020-06-07 NOTE — CONSULTS
"  [facility]  Adult Nutrition  Consult Note    SUMMARY     Recommendations    1.) Suggest cardiac, diabetic diet restrictions.   2.) If PO intakes <50% of meals, add Boost Glucose Control.   3.) Suggest MVI.   4.) Daily weights    Goals: 1.) Pt to consume/tolerate >75% of meals by follow up.   Nutrition Goal Status: new  Communication of RD Recs: other (comment)(POC)    Reason for Assessment    Reason For Assessment: consult  Diagnosis: stroke/CVA  Relevant Medical History: HTN, CAD,, DM2  Interdisciplinary Rounds: did not attend  General Information Comments: Pt previously NPO, non-aphasic, with +SLP/ROBERT with diet advanced. Pt with fair intake thus far. PTA, good intake of meals. No GI distress. WT hx reviewed per chart: UBW 115kg (2019) reflecting no recent wt changes. NFPE completed with no s/s of wasting. Pt does not meet malnutrition criteria at this time.   Nutrition Discharge Planning: D/c on cardiac, diabetic diet restrictions.     Nutrition Risk Screen    Nutrition Risk Screen: no indicators present    Nutrition/Diet History    Spiritual, Cultural Beliefs, Hindu Practices, Values that Affect Care: no  Food Allergies: NKFA  Factors Affecting Nutritional Intake: None identified at this time    Anthropometrics    Temp: 97.5 °F (36.4 °C)  Height Method: Stated  Height: 5' 8" (172.7 cm)  Height (inches): 68 in  Weight Method: Bed Scale  Weight: 116.6 kg (257 lb)  Weight (lb): 257 lb  Ideal Body Weight (IBW), Male: 154 lb  % Ideal Body Weight, Male (lb): 166.88 %  BMI (Calculated): 39.1  Usual Body Weight (UBW), k kg(2019)  % Usual Body Weight: 102.77       Lab/Procedures/Meds    Pertinent Labs Reviewed: reviewed  Pertinent Labs Comments: HbA1C 7.0  Pertinent Medications Reviewed: reviewed  Pertinent Medications Comments: aspirin, statin    Estimated/Assessed Needs    Weight Used For Calorie Calculations: 117.9 kg (259 lb 14.8 oz)  Energy Calorie Requirements (kcal): " 1929  Energy Need Method: [place](no AF)  Protein Requirements: 84-91(g/day)  Weight Used For Protein Calculations: 70 kg (154 lb 5.2 oz)(1.2-1.3 g/kg of IBW)     Estimated Fluid Requirement Method: RDA Method(or per MD)  RDA Method (mL): 1929  CHO Requirement: 241gm/day      Nutrition Prescription Ordered    Current Diet Order: diabetic    Evaluation of Received Nutrient/Fluid Intake    I/O: -0.3L since admit  Energy Calories Required: meeting needs  Protein Required: meeting needs  Fluid Required: other (see comments)(per MD)  Comments: LBM 6/6  % Intake of Estimated Energy Needs: 50 - 75 %  % Meal Intake: 50 - 75 %    Nutrition Risk    Level of Risk/Frequency of Follow-up: low(x1/week)     Assessment and Plan  Nutrition Problem  Inadequate oral intakes    Related to (etiology):   Varied appetite    Signs and Symptoms (as evidenced by):   <75% of nutritional needs being met     Interventions(treatment strategy):  Collaboration of care with other providers  Referral of care  Modified diet-cardiac, diabetic    Nutrition Diagnosis Status:   New       Monitor and Evaluation    Food and Nutrient Intake: energy intake, enteral nutrition intake  Food and Nutrient Adminstration: diet order, enteral and parenteral nutrition administration  Anthropometric Measurements: weight, weight change, body mass index  Biochemical Data, Medical Tests and Procedures: electrolyte and renal panel, gastrointestinal profile, glucose/endocrine profile, inflammatory profile  Nutrition-Focused Physical Findings: overall appearance     Malnutrition Assessment    Weight Loss (Malnutrition): (stable wt x 10 months)   Orbital Region (Subcutaneous Fat Loss): well nourished  Upper Arm Region (Subcutaneous Fat Loss): well nourished   [place] Region (Muscle Loss): well nourished  Clavicle Bone Region (Muscle Loss): well nourished  Clavicle and Acromion Bone Region (Muscle Loss): well nourished  Patellar Region (Muscle Loss): well  nourished  Anterior Thigh Region (Muscle Loss): well nourished  Posterior Calf Region (Muscle Loss): well nourished   Edema (Fluid Accumulation): 1-->trace   Subcutaneous Fat Loss (Final Summary): well nourished  Muscle Loss Evaluation (Final Summary): well nourished         Nutrition Follow-Up    RD Follow-up?: Yes

## 2020-06-07 NOTE — PLAN OF CARE
Bedside swallow assessment completed. Recommend regular texture diet with thin liquids. Order requested from team. ST to f/u to ensure tolerance and to complete cognitive-linguistic evaluation. Darling Aguirre CCC-SLP 6/7/2020 9:17 AM

## 2020-06-07 NOTE — ASSESSMENT & PLAN NOTE
-stroke risk factor    Keep SBP <220 in setting of acute stroke   Home med-omestartan resumed at reduces fool,

## 2020-06-07 NOTE — SUBJECTIVE & OBJECTIVE
Neurologic Chief Complaint: visual deficit    Subjective:     Interval History: Patient is seen for follow-up neurological assessment and treatment recommendations:     MRI with moderate sized right superior cerebellum infarct and subacute right occipital infarct with some hemorrhagic transformation. Stroke work up nearly complete- TTE pending read and needs PT eval.     HPI, Past Medical, Family, and Social History remains the same as documented in the initial encounter.     Review of Systems   HENT: Negative for trouble swallowing.    Eyes: Positive for visual disturbance.   Respiratory: Negative for shortness of breath and wheezing.    Cardiovascular: Negative for chest pain and palpitations.   Gastrointestinal: Negative for nausea and vomiting.   Musculoskeletal: Negative for gait problem.   Skin: Negative for color change and pallor.   Neurological: Positive for speech difficulty. Negative for weakness.   Psychiatric/Behavioral: Negative for confusion and decreased concentration.     Scheduled Meds:   aspirin  81 mg Oral Daily    atorvastatin  40 mg Oral Daily    clopidogreL  75 mg Oral Daily    olmesartan  20 mg Oral Once     Continuous Infusions:  PRN Meds:acetaminophen, sodium chloride 0.9%    Objective:     Vital Signs (Most Recent):  Temp: 96 °F (35.6 °C) (06/07/20 0910)  Pulse: 87 (06/07/20 1148)  Resp: 18 (06/07/20 0513)  BP: 126/75 (06/07/20 0701)  SpO2: 95 % (06/07/20 0513)  BP Location: Right arm    Vital Signs Range (Last 24H):  Temp:  [96 °F (35.6 °C)-97.7 °F (36.5 °C)]   Pulse:  [54-87]   Resp:  [15-20]   BP: (126-185)/(75-84)   SpO2:  [94 %-97 %]   BP Location: Right arm    Physical Exam   Constitutional: He is oriented to person, place, and time.   HENT:   Head: Normocephalic and atraumatic.   Eyes: Pupils are equal, round, and reactive to light. Conjunctivae and EOM are normal.   Neck: Normal range of motion.   Cardiovascular: Normal rate.   Pulmonary/Chest: Effort normal.   S/p loop  recorder   Abdominal: Soft.   Musculoskeletal: Normal range of motion.   Neurological: He is alert and oriented to person, place, and time.   Skin: Skin is warm and dry.   Psychiatric: His speech is slurred.       Neurological Exam:   LOC: alert  Attention Span: Good   Language: No aphasia  Articulation: Dysarthria  Orientation: Person, Place, Time   Visual Fields: right homonymous hemianopia  EOM (CN III, IV, VI): Full/intact  Pupils (CN II, III): PERRL  Facial Sensation (CN V): Normal  Facial Movement (CN VII): Symmetric facial expression    Motor: Arm left  Normal 5/5  Leg left  Normal 5/5  Arm right  Normal 5/5  Leg right Normal 5/5  Cebellar: Lower Extremity Appendicular Ataxia (Heel to Shin)  Left  Sensation: Intact to light touch, temperature and vibration  Tone: Normal tone throughout    Laboratory:  CMP:   Recent Labs   Lab 06/07/20  0237   CALCIUM 9.8   ALBUMIN 3.7   PROT 7.6      K 4.0   CO2 27      BUN 14   CREATININE 0.9   ALKPHOS 65   ALT 41   AST 31   BILITOT 0.4     BMP:   Recent Labs   Lab 06/07/20  0237      K 4.0      CO2 27   BUN 14   CREATININE 0.9   CALCIUM 9.8     CBC:   Recent Labs   Lab 06/07/20  0237   WBC 10.08   RBC 5.51   HGB 14.0   HCT 46.2      MCV 84   MCH 25.4*   MCHC 30.3*     Lipid Panel:   Recent Labs   Lab 06/06/20  0859   CHOL 129   LDLCALC 58.2*   HDL 50   TRIG 104     Coagulation:   Recent Labs   Lab 06/07/20  0237   INR 1.0   APTT 25.2     Platelet Aggregation Study: No results for input(s): PLTAGG, PLTAGINTERP, PLTAGREGLACO, ADPPLTAGGREG in the last 168 hours.  Hgb A1C:   Recent Labs   Lab 06/05/20  0024   HGBA1C 7.0*     TSH:   Recent Labs   Lab 06/06/20  0859   TSH 0.029*       Diagnostic Results     Brain Imaging     _  Moderate-sized signal abnormality right superior cerebellum most compatible with recent infarction.  No significant mass effect or hydrocephalus.    Moderate signal abnormality right parasagittal occipital lobe with  susceptibility and T1 shortening along the cortex and underlying vasogenic edema concerning for subacute age cortical hemorrhage and vasogenic edema, however underlying hemorrhagic mass cannot be excluded.  Clinical correlation and follow-up advised.  This could be further characterized with postcontrast imaging as well as CT.  Comparison with prior imaging may be of further diagnostic value    Vessel Imaging     MRA brain and neck 6/6/20    MRA head: Unremarkable MRA of the head specifically without evidence for focal stenosis or proximal occlusion.    MRA neck: Irregularity of the carotid bifurcation and proximal ICAs although distorted by motion concerning for atherosclerotic disease with overall less than 50% proximal ICA stenosis by NASCET criteria.    Cardiac Imaging   TTE- pending

## 2020-06-07 NOTE — ASSESSMENT & PLAN NOTE
Area of cytotoxic cerebral edema identified when reviewing brain imaging in the territory of th right cerebellary artery. There is mass effect associated with it. We will continue to monitor the patients clinical exam for any worsening of symptoms which may indicate expansion of the stroke or the area of the edema resulting in the clinical change. The pattern is suggestive of embolic etiology.

## 2020-06-07 NOTE — PT/OT/SLP EVAL
Speech Language Pathology Evaluation  Bedside Swallow    Patient Name:  Lorena Rodriguez   MRN:  [id number]  Admitting Diagnosis: Embolic stroke involving right cerebellar artery    Recommendations:                 General Recommendations:  Dysphagia therapy and Cognitive-linguistic evaluation  Diet recommendations:  Regular, Thin   Aspiration Precautions: HOB to 90 degrees, Remain upright 30 minutes post meal, Small bites/sips and Standard aspiration precautions   General Precautions: Standard, aspiration, fall  Communication strategies:  none    History:     Past Medical History:   Diagnosis Date    Coronary artery disease     Diabetes mellitus     pre-diabetes    Hypertension        Past Surgical History:   Procedure Laterality Date    ARTHROSCOPIC REPAIR OF ROTATOR CUFF OF SHOULDER Right 8/20/2019    Procedure: REPAIR, ROTATOR CUFF, ARTHROSCOPIC;  Surgeon: Nj You MD;  Location: [facility];  Service: Orthopedics;  Laterality: Right;    ARTHROSCOPY OF SHOULDER WITH DECOMPRESSION OF SUBACROMIAL SPACE Right 8/20/2019    Procedure: ARTHROSCOPY, SHOULDER, WITH SUBACROMIAL SPACE DECOMPRESSION;  Surgeon: Nj You MD;  Location: [facility];  Service: Orthopedics;  Laterality: Right;  WITH BLOCK    CHONDROPLASTY OF SHOULDER Right 8/20/2019    Procedure: CHONDROPLASTY, SHOULDER;  Surgeon: Nj You MD;  Location: [facility];  Service: Orthopedics;  Laterality: Right;    CORONARY STENT PLACEMENT      EYE SURGERY      HAND AMPUTATION THROUGH METACARPAL      right    HEMORRHOID SURGERY      HERNIA REPAIR      SHOULDER SURGERY      right       Prior diet: regular      Subjective     Spoke with RN prior to entering pt's room . Pt seen sitting in bedside chair for session. Alert and agreeable to ST.       Pain/Comfort:  · Pain Rating 1: 0/10  · Pain Rating Post-Intervention 1: 0/10    Objective:     Oral Musculature Evaluation  · Oral Musculature: WFL  · Dentition: lower dentures, upper dentures, present and  adequate  · Secretion Management: adequate  · Mucosal Quality: good  · Oral Labial Strength and Mobility: WFL  · Lingual Strength and Mobility: WFL  · Volitional Cough: elicited  · Volitional Swallow: elicited  · Voice Prior to PO Intake: clear; mild dysarthria    Bedside Swallow Eval:   Consistencies Assessed:  · Thin liquids via single and consecutive cup and straw sips  · Puree applesauce  · Solids crackers     Oral Phase:   · WFL    Pharyngeal Phase:   · no overt clinical signs/symptoms of aspiration  · no overt clinical signs/symptoms of pharyngeal dysphagia    Compensatory Strategies  · None    Treatment: Provided education to pt re: role of ST,  POC, swallow precautions, recommendations for regular consistency diet with thin liquids, and plan to f/u to complete cognitive-linguistic evaluation. He verbalized understanding. White board updated. RN and MD notified of results and recs.       Assessment:     Lorena Rodriguez is a 65 y.o. male with an SLP diagnosis of functional oropharyngeal swallow. .  ST to f/u to ensure tolerance and to complete REMY.     Goals:   Multidisciplinary Problems     SLP Goals        Problem: SLP Goal    Goal Priority Disciplines Outcome   SLP Goal     SLP Ongoing, Progressing   Description:  Goals expected to be met by 6/14:   1. Pt will tolerate regular diet with thin liquids without overt s/sx airway compromise.  2. Pt will participate in cognitive-linguistic evaluation to further develop POC.                       Plan:     · Patient to be seen:  3 x/week   · Plan of Care expires:  07/06/20  · Plan of Care reviewed with:  patient   · SLP Follow-Up:  Yes       Discharge recommendations:  outpatient speech therapy   Barriers to Discharge:  None    Time Tracking:     SLP Treatment Date:   06/07/20  Speech Start Time:  0754  Speech Stop Time:  0805     Speech Total Time (min):  11 min    Billable Minutes: Eval Swallow and Oral Function 11 minutes    Darling Aguirre CCC-SLP  06/07/2020

## 2020-06-07 NOTE — PLAN OF CARE
Problem: Oral Intake Inadequate  Goal: Improved Oral Intake  Outcome: Ongoing, Progressing  Intervention: Promote and Optimize Oral Intake  Flowsheets (Taken 6/7/2020 7550)  Oral Nutrition Promotion: calorie dense foods provided; nutritional therapy counseling provided; other (see comments)     Recommendations     1.) Suggest cardiac, diabetic diet restrictions.   2.) If PO intakes <50% of meals, add Boost Glucose Control.   3.) Suggest MVI.   4.) Daily weights     Goals: 1.) Pt to consume/tolerate >75% of meals by follow up.   Nutrition Goal Status: new  Communication of RD Recs: other (comment)(POC)

## 2020-06-07 NOTE — PLAN OF CARE
OT evaluation completed.  RAMON Bhat  6/7/2020    Problem: Occupational Therapy Goal  Goal: Occupational Therapy Goal  Description  Goals set 6/7 to be addressed for 7 days with expiration date, 6/14:  Patient will increase functional independence with ADLs by performing:  Patient will perform horizontal / vertical VOR exercises at 80 head turns/ minute.  Not met  Patient will demonstrate independence with HEP for anchoring during reading.    Not met  Patient will demonstrate ability to read 3 paragraphs without error.  Not met  Patient's family / caregiver will demonstrate independence and safety with assisting patient with self-care skills and functional mobility.     Not met  Patient and/or patient's family will verbalize understanding of stroke prevention guidelines, personal risk factors and stroke warning signs via teachback method.  Not met            6/7/2020 0652 by RAMON Bhat  Outcome: Ongoing, Progressing

## 2020-06-07 NOTE — PROGRESS NOTES
[facility]  Vascular Neurology  Comprehensive Stroke Center  Progress Note    Assessment/Plan:     * Embolic stroke involving right cerebellar artery  65 y.o. yo male with PMHx  who presented to OSH on 6/4 with left lateral hemianopia, dysarthria, and left facial droop. OSH MRI revealed acute right cerebellar infarct. Patient left before being seen by neurologist. On 6/6 patient presented to [facility] ED with newly noted right hemianopia. tPA not given as patient has had recent stroke. Admitted to Vascular Neurology for stroke work up.       MRI with moderate sized right superior cerebellum infarct and subacute right occipital infarct with some hemorrhagic transformation. Stroke work up nearly complete- TTE pending read and needs PT eval.       Antithrombotics for secondary stroke prevention: Antiplatelets: Aspirin: 81 mg daily  Clopidogrel: 75 mg daily    Statins for secondary stroke prevention and hyperlipidemia, if present:   Statins: Atorvastatin- 40 mg daily    Aggressive risk factor modification: HTN, DM, CAD     Rehab efforts: The patient has been evaluated by a stroke team provider and the therapy needs have been fully considered based off the presenting complaints and exam findings. The following therapy evaluations are needed: PT evaluate and treat, OT evaluate and treat, SLP evaluate and treat, PM&R evaluate for appropriate placement    Diagnostics ordered/pending: HgbA1C to assess blood glucose levels, Lipid Profile to assess cholesterol levels, MRA head to assess vasculature, MRA neck/arch to assess vasculature, MRI head without contrast to assess brain parenchyma, TTE to assess cardiac function/status , TSH to assess thyroid function    VTE prophylaxis: Heparin 5000 units SQ every 8 hours    BP parameters: Infarct: No intervention, SBP <220        Cytotoxic cerebral edema  Area of cytotoxic cerebral edema identified when reviewing brain imaging in the territory of th right cerebellary  artery. There is mass effect associated with it. We will continue to monitor the patients clinical exam for any worsening of symptoms which may indicate expansion of the stroke or the area of the edema resulting in the clinical change. The pattern is suggestive of embolic etiology.        Homonymous hemianopsia following cerebrovascular accident  2/2 stroke     OT recommended outpatient   Awaiting PT eval    Hyperlipemia  -stroke risk factor    LDL 58.2  Continue home Lipitor     Essential (primary) hypertension  -stroke risk factor    Keep SBP <220 in setting of acute stroke   Home med-omestartan resumed at reduces fool,         6/7/20: MRI with moderate sized right superior cerebellum infarct and subacute right occipital infarct with some hemorrhagic transformation. Stroke work up nearly complete- TTE pending read and needs PT eval.     STROKE DOCUMENTATION   Acute Stroke Times   Symptom Onset Date: 06/04/20    NIH Scale:  1a. Level of Consciousness: 0-->Alert, keenly responsive  1b. LOC Questions: 0-->Answers both questions correctly  1c. LOC Commands: 0-->Performs both tasks correctly  2. Best Gaze: 0-->Normal  3. Visual: 2-->Complete hemianopia  4. Facial Palsy: 0-->Normal symmetrical movements  5a. Motor Arm, Left: 0-->No drift, limb holds 90 (or 45) degrees for full 10 secs  5b. Motor Arm, Right: 0-->No drift, limb holds 90 (or 45) degrees for full 10 secs  6a. Motor Leg, Left: 0-->No drift, leg holds 30 degree position for full 5 secs  6b. Motor Leg, Right: 0-->No drift, leg holds 30 degree position for full 5 secs  7. Limb Ataxia: 1-->Present in one limb  8. Sensory: 0-->Normal, no sensory loss  9. Best Language: 0-->No aphasia, normal  10. Dysarthria: 1-->Mild-to-moderate dysarthria, patient slurs at least some words and, at worst, can be understood with some difficulty  11. Extinction and Inattention (formerly Neglect): 0-->No abnormality  Total (NIH Stroke Scale): 4       Modified Rainer Score:  1  Thayer Coma Scale:    ABCD2 Score:    [id number] Score:   HAS -BLED Score:   ICH Score:   Hunt & Mooney Classification:      Hemorrhagic change of an Ischemic Stroke: Does this patient have an ischemic stroke with hemorrhagic changes? Yes, Grading Scale: HI Type 2 (HI-2) = confluent petechiae within the infarcted area but without space-occupying effect. Is this a symptomatic change?  No - Hemorrhage is not clinically significant     Neurologic Chief Complaint: visual deficit    Subjective:     Interval History: Patient is seen for follow-up neurological assessment and treatment recommendations:     MRI with moderate sized right superior cerebellum infarct and subacute right occipital infarct with some hemorrhagic transformation. Stroke work up nearly complete- TTE pending read and needs PT eval.     HPI, Past Medical, Family, and Social History remains the same as documented in the initial encounter.     Review of Systems   HENT: Negative for trouble swallowing.    Eyes: Positive for visual disturbance.   Respiratory: Negative for shortness of breath and wheezing.    Cardiovascular: Negative for chest pain and palpitations.   Gastrointestinal: Negative for nausea and vomiting.   Musculoskeletal: Negative for gait problem.   Skin: Negative for color change and pallor.   Neurological: Positive for speech difficulty. Negative for weakness.   Psychiatric/Behavioral: Negative for confusion and decreased concentration.     Scheduled Meds:   aspirin  81 mg Oral Daily    atorvastatin  40 mg Oral Daily    clopidogreL  75 mg Oral Daily    olmesartan  20 mg Oral Once     Continuous Infusions:  PRN Meds:acetaminophen, sodium chloride 0.9%    Objective:     Vital Signs (Most Recent):  Temp: 96 °F (35.6 °C) (06/07/20 0910)  Pulse: 87 (06/07/20 1148)  Resp: 18 (06/07/20 0513)  BP: 126/75 (06/07/20 0701)  SpO2: 95 % (06/07/20 0513)  BP Location: Right arm    Vital Signs Range (Last 24H):  Temp:  [96 °F (35.6 °C)-97.7 °F  (36.5 °C)]   Pulse:  [54-87]   Resp:  [15-20]   BP: (126-185)/(75-84)   SpO2:  [94 %-97 %]   BP Location: Right arm    Physical Exam   Constitutional: He is oriented to person, place, and time.   HENT:   Head: Normocephalic and atraumatic.   Eyes: Pupils are equal, round, and reactive to light. Conjunctivae and EOM are normal.   Neck: Normal range of motion.   Cardiovascular: Normal rate.   Pulmonary/Chest: Effort normal.   S/p loop recorder   Abdominal: Soft.   Musculoskeletal: Normal range of motion.   Neurological: He is alert and oriented to person, place, and time.   Skin: Skin is warm and dry.   Psychiatric: His speech is slurred.       Neurological Exam:   LOC: alert  Attention Span: Good   Language: No aphasia  Articulation: Dysarthria  Orientation: Person, Place, Time   Visual Fields: right homonymous hemianopia  EOM (CN III, IV, VI): Full/intact  Pupils (CN II, III): PERRL  Facial Sensation (CN V): Normal  Facial Movement (CN VII): Symmetric facial expression    Motor: Arm left  Normal 5/5  Leg left  Normal 5/5  Arm right  Normal 5/5  Leg right Normal 5/5  Cebellar: Lower Extremity Appendicular Ataxia (Heel to Shin)  Left  Sensation: Intact to light touch, temperature and vibration  Tone: Normal tone throughout    Laboratory:  CMP:   Recent Labs   Lab 06/07/20 0237   CALCIUM 9.8   ALBUMIN 3.7   PROT 7.6      K 4.0   CO2 27      BUN 14   CREATININE 0.9   ALKPHOS 65   ALT 41   AST 31   BILITOT 0.4     BMP:   Recent Labs   Lab 06/07/20 0237      K 4.0      CO2 27   BUN 14   CREATININE 0.9   CALCIUM 9.8     CBC:   Recent Labs   Lab 06/07/20 0237   WBC 10.08   RBC 5.51   HGB 14.0   HCT 46.2      MCV 84   MCH 25.4*   MCHC 30.3*     Lipid Panel:   Recent Labs   Lab 06/06/20  0859   CHOL 129   LDLCALC 58.2*   HDL 50   TRIG 104     Coagulation:   Recent Labs   Lab 06/07/20 0237   INR 1.0   APTT 25.2     Platelet Aggregation Study: No results for input(s): PLTAGG, PLTAGINTERP,  PLTAGREGLACO, ADPPLTAGGREG in the last 168 hours.  Hgb A1C:   Recent Labs   Lab 06/05/20  0024   HGBA1C 7.0*     TSH:   Recent Labs   Lab 06/06/20  0859   TSH 0.029*       Diagnostic Results     Brain Imaging     _  Moderate-sized signal abnormality right superior cerebellum most compatible with recent infarction.  No significant mass effect or hydrocephalus.    Moderate signal abnormality right parasagittal occipital lobe with susceptibility and T1 shortening along the cortex and underlying vasogenic edema concerning for subacute age cortical hemorrhage and vasogenic edema, however underlying hemorrhagic mass cannot be excluded.  Clinical correlation and follow-up advised.  This could be further characterized with postcontrast imaging as well as CT.  Comparison with prior imaging may be of further diagnostic value    Vessel Imaging     MRA brain and neck 6/6/20    MRA head: Unremarkable MRA of the head specifically without evidence for focal stenosis or proximal occlusion.    MRA neck: Irregularity of the carotid bifurcation and proximal ICAs although distorted by motion concerning for atherosclerotic disease with overall less than 50% proximal ICA stenosis by NASCET criteria.    Cardiac Imaging   TTE- pending      Belinda Arreguin NP  [facility]  Department of Vascular Neurology   [facility]

## 2020-06-07 NOTE — ASSESSMENT & PLAN NOTE
65 y.o. yo male with PMHx  who presented to OSH on 6/4 with left lateral hemianopia, dysarthria, and left facial droop. OSH MRI revealed acute right cerebellar infarct. Patient left before being seen by neurologist. On 6/6 patient presented to [facility] ED with newly noted right hemianopia. tPA not given as patient has had recent stroke. Admitted to Vascular Neurology for stroke work up.       MRI with moderate sized right superior cerebellum infarct and subacute right occipital infarct with some hemorrhagic transformation. Stroke work up nearly complete- TTE pending read and needs PT eval.       Antithrombotics for secondary stroke prevention: Antiplatelets: Aspirin: 81 mg daily  Clopidogrel: 75 mg daily    Statins for secondary stroke prevention and hyperlipidemia, if present:   Statins: Atorvastatin- 40 mg daily    Aggressive risk factor modification: HTN, DM, CAD     Rehab efforts: The patient has been evaluated by a stroke team provider and the therapy needs have been fully considered based off the presenting complaints and exam findings. The following therapy evaluations are needed: PT evaluate and treat, OT evaluate and treat, SLP evaluate and treat, PM&R evaluate for appropriate placement    Diagnostics ordered/pending: HgbA1C to assess blood glucose levels, Lipid Profile to assess cholesterol levels, MRA head to assess vasculature, MRA neck/arch to assess vasculature, MRI head without contrast to assess brain parenchyma, TTE to assess cardiac function/status , TSH to assess thyroid function    VTE prophylaxis: Heparin 5000 units SQ every 8 hours    BP parameters: Infarct: No intervention, SBP <220

## 2020-06-07 NOTE — PT/OT/SLP EVAL
"Occupational Therapy   Evaluation    Name: Lorena Rodriguez  MRN: [id number]  Admitting Diagnosis:  Embolic stroke involving right cerebellar artery      Recommendations:     Discharge Recommendations: outpatient OT  Discharge Equipment Recommendations:  none  Barriers to discharge:  None    Assessment:     Lorena Rodriguez is a 65 y.o. male with a medical diagnosis of Embolic stroke involving right cerebellar artery.  He presents with performance deficits affecting function: visual deficits.      Rehab Prognosis: Good; patient would benefit from acute skilled OT services to address these deficits and reach maximum level of function.       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  I stopped working and was going to drive home but I only got a few blocks because I couldn't see 1/2 of my left eye.  On the 29th I went to the hospital; they did a CT scan and wanted me to spend the night.  I left.  Then on June 4th I decided to cut the grass; however my right leg was wobbly.  I found myself off balance.  I couldn't let go of the  to walk.  Once I sat down, my whole body started to collapse.  I could hear everything but I couldn't talk.  I went to [facility] and they did a CT and MRI but I ended up leaving against medical advice.  I never saw a neurologist.  That night and the next morning, I started to feel iffy and I wasn't walking good so I came here."      Occupational Profile:  Patient resides in [place] with wife in one story home with no steps to enter.  PTA patient independent with ADLs including driving.  Currently owns no DME. Patient is right handed.  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  " Hobbies:  Working in the garage, walking in the park.    Pain/Comfort:  · Pain Rating 1: 0/10  · Pain Rating Post-Intervention 1: 0/10    Patients cultural, spiritual, Muslim conflicts given the current situation: no    Objective:     Communicated with: Nurse prior to session.  Patient found supine with telemetry, peripheral IV upon OT entry to room. Family not present.    General Precautions: Standard, aspiration, fall   Orthopedic Precautions:N/A   Braces: N/A     Occupational Performance:    Bed Mobility:    · Patient completed Rolling/Turning to Left with  independence  · Patient completed Rolling/Turning to Right with independence  · Patient completed Scooting/Bridging with independence  · Patient completed Supine to Sit with independence  · Patient completed Sit to Supine with independence    Functional Mobility/Transfers:  · Patient completed Sit <> Stand Transfer with modified independence  with  no assistive device   · Patient completed Bed <> Chair Transfer using Stand Pivot technique with modified independence with no assistive device    Activities of Daily Living:  · Grooming: modified independence while standing  · Upper Body Dressing: modified independence    · Lower Body Dressing: modified independence    · Toileting: modified independence      Cognitive/Visual Perceptual:  Cognitive/Psychosocial Skills:     -       Oriented to: Person, Place, Time and Situation   -       Follows Commands/attention:Follows two-step commands  -       Communication: dysarthria  -       Mood/Affect/Coping skills/emotional control: Appropriate to situation and Cooperative  Visual/Perceptual:      - hemianopia       Physical Exam:  Postural examination/scapula alignment:    -       Rounded shoulders  Edema:  None noted  Upper Extremity Range of Motion:     -       Right Upper Extremity: WNL  -       Left Upper Extremity: WNL  Upper Extremity Strength:    -       Right Upper Extremity: WNL  -       Left Upper Extremity:  WNL    [facility] 6 Click ADL:  AMPAC Total Score: 24    Treatment & Education:  Patient education provided on role of OT and need for outpt OT upon discharge.  Continued education, patient/ family training recommended.  Patient alert and oriented x 3; able to follow 4/4 one step commands.  Patient attentive and interactive throughout the session.  Patient able to identify 5/5 body parts.  Able to name 5/5 objects.  Able to sequence 7/7 days of the week and 12/12 months of the year.  Demonstrates good visual spatial skills during clock design.  Able to identify 20/20 items in visual scanning task.  Patient's functional status and disposition recommendation discussed with stroke team in daily rounds.  White board updated in patient's room.  OT asked if there were any other questions; patient/ family had no further questions.   Education:    Patient left supine with all lines intact and call button in reach    Postural Assessment Scale for Stroke: 36/36  1.  Sitting without support--3  2.  Standing with support--3  3.  Standing without support--3  4 and 5: Standing on nonparetic /paretic leg--6  6.  Supine to affected side lateral--3  7.  Supine to nonaffected side lateral--3  8.  Supine to sitting up on the edge of the table--3  9.  Sitting on the edge of the table to supine--3  10.  Sitting to standing up--3  11.  Standing up to sitting down--3  12.  Standing, picking up a pencil from the floor--3     The Barthel Index: 100/ 100      GOALS:   Multidisciplinary Problems     Occupational Therapy Goals        Problem: Occupational Therapy Goal    Goal Priority Disciplines Outcome Interventions   Occupational Therapy Goal     OT, PT/OT Ongoing, Progressing    Description:  Goals set 6/7 to be addressed for 7 days with expiration date, 6/14:  Patient will increase functional independence with ADLs by performing:  Patient will perform horizontal / vertical VOR exercises at 80 head turns/ minute.  Not met  Patient will  demonstrate independence with HEP for anchoring during reading.    Not met  Patient will demonstrate ability to read 3 paragraphs without error.  Not met  Patient's family / caregiver will demonstrate independence and safety with assisting patient with self-care skills and functional mobility.     Not met  Patient and/or patient's family will verbalize understanding of stroke prevention guidelines, personal risk factors and stroke warning signs via teachback method.  Not met                             History:     Past Medical History:   Diagnosis Date    Coronary artery disease     Diabetes mellitus     pre-diabetes    Hypertension        Past Surgical History:   Procedure Laterality Date    ARTHROSCOPIC REPAIR OF ROTATOR CUFF OF SHOULDER Right 8/20/2019    Procedure: REPAIR, ROTATOR CUFF, ARTHROSCOPIC;  Surgeon: Nj You MD;  Location: [facility];  Service: Orthopedics;  Laterality: Right;    ARTHROSCOPY OF SHOULDER WITH DECOMPRESSION OF SUBACROMIAL SPACE Right 8/20/2019    Procedure: ARTHROSCOPY, SHOULDER, WITH SUBACROMIAL SPACE DECOMPRESSION;  Surgeon: Nj You MD;  Location: [facility];  Service: Orthopedics;  Laterality: Right;  WITH BLOCK    CHONDROPLASTY OF SHOULDER Right 8/20/2019    Procedure: CHONDROPLASTY, SHOULDER;  Surgeon: Nj You MD;  Location: [facility];  Service: Orthopedics;  Laterality: Right;    CORONARY STENT PLACEMENT      EYE SURGERY      HAND AMPUTATION THROUGH METACARPAL      right    HEMORRHOID SURGERY      HERNIA REPAIR      SHOULDER SURGERY      right       Time Tracking:     OT Date of Treatment: 06/07/20  OT Start Time: 0553  OT Stop Time: 0616  OT Total Time (min): 23 min    Billable Minutes:Evaluation 13  Cognitive Retraining 10    RAMON Egan  6/7/2020

## 2020-06-07 NOTE — PLAN OF CARE
Problem: Fall Injury Risk  Goal: Absence of Fall and Fall-Related Injury  Outcome: Ongoing, Progressing     Problem: Adult Inpatient Plan of Care  Goal: Plan of Care Review  Outcome: Ongoing, Progressing     Patient is AAO x4. POC reviewed with patient. Patient verbalized understanding. Patient's breathing is unlabored with equal chest expansion. Patient denies any numbness and tingling. Patient did complain of HA;PRN pain med given. Patient ambulates to the restroom. Patient remained free from falls. Patient rested well through shift. Bed in lowest position,bed alarm on, side rails up x3, no complaints or signs of distress. WCTM.

## 2020-06-08 VITALS
SYSTOLIC BLOOD PRESSURE: 141 MMHG | HEART RATE: 76 BPM | WEIGHT: 257 LBS | RESPIRATION RATE: 16 BRPM | HEIGHT: 68 IN | OXYGEN SATURATION: 93 % | TEMPERATURE: 97 F | BODY MASS INDEX: 38.95 KG/M2 | DIASTOLIC BLOOD PRESSURE: 72 MMHG

## 2020-06-08 LAB
ALBUMIN SERPL BCP-MCNC: 3.6 G/DL (ref 3.5–5.2)
ALP SERPL-CCNC: 63 U/L (ref 55–135)
ALT SERPL W/O P-5'-P-CCNC: 48 U/L (ref 10–44)
ANION GAP SERPL CALC-SCNC: 10 MMOL/L (ref 8–16)
AST SERPL-CCNC: 34 U/L (ref 10–40)
BASOPHILS # BLD AUTO: 0.05 K/UL (ref 0–0.2)
BASOPHILS NFR BLD: 0.5 % (ref 0–1.9)
BILIRUB SERPL-MCNC: 0.5 MG/DL (ref 0.1–1)
BUN SERPL-MCNC: 16 MG/DL (ref 8–23)
CALCIUM SERPL-MCNC: 9.5 MG/DL (ref 8.7–10.5)
CHLORIDE SERPL-SCNC: 102 MMOL/L (ref 95–110)
CO2 SERPL-SCNC: 24 MMOL/L (ref 23–29)
CREAT SERPL-MCNC: 0.8 MG/DL (ref 0.5–1.4)
DIFFERENTIAL METHOD: ABNORMAL
EOSINOPHIL # BLD AUTO: 0.3 K/UL (ref 0–0.5)
EOSINOPHIL NFR BLD: 3.3 % (ref 0–8)
ERYTHROCYTE [DISTWIDTH] IN BLOOD BY AUTOMATED COUNT: 14.9 % (ref 11.5–14.5)
EST. GFR  (AFRICAN AMERICAN): >60 ML/MIN/1.73 M^2
EST. GFR  (NON AFRICAN AMERICAN): >60 ML/MIN/1.73 M^2
GLUCOSE SERPL-MCNC: 102 MG/DL (ref 70–110)
HCT VFR BLD AUTO: 45 % (ref 40–54)
HGB BLD-MCNC: 14 G/DL (ref 14–18)
IMM GRANULOCYTES # BLD AUTO: 0.02 K/UL (ref 0–0.04)
IMM GRANULOCYTES NFR BLD AUTO: 0.2 % (ref 0–0.5)
LYMPHOCYTES # BLD AUTO: 2.2 K/UL (ref 1–4.8)
LYMPHOCYTES NFR BLD: 23.4 % (ref 18–48)
MCH RBC QN AUTO: 25.8 PG (ref 27–31)
MCHC RBC AUTO-ENTMCNC: 31.1 G/DL (ref 32–36)
MCV RBC AUTO: 83 FL (ref 82–98)
MONOCYTES # BLD AUTO: 1.3 K/UL (ref 0.3–1)
MONOCYTES NFR BLD: 13.9 % (ref 4–15)
NEUTROPHILS # BLD AUTO: 5.4 K/UL (ref 1.8–7.7)
NEUTROPHILS NFR BLD: 58.7 % (ref 38–73)
NRBC BLD-RTO: 0 /100 WBC
PLATELET # BLD AUTO: 244 K/UL (ref 150–350)
PMV BLD AUTO: 9.5 FL (ref 9.2–12.9)
POTASSIUM SERPL-SCNC: 3.5 MMOL/L (ref 3.5–5.1)
PROT SERPL-MCNC: 7.3 G/DL (ref 6–8.4)
RBC # BLD AUTO: 5.42 M/UL (ref 4.6–6.2)
SODIUM SERPL-SCNC: 136 MMOL/L (ref 136–145)
WBC # BLD AUTO: 9.26 K/UL (ref 3.9–12.7)

## 2020-06-08 PROCEDURE — 25000003 PHARM REV CODE 250: Performed by: NURSE PRACTITIONER

## 2020-06-08 PROCEDURE — 99238 HOSP IP/OBS DSCHRG MGMT 30/<: CPT | Mod: ,,, | Performed by: PSYCHIATRY & NEUROLOGY

## 2020-06-08 PROCEDURE — 92523 SPEECH SOUND LANG COMPREHEN: CPT

## 2020-06-08 PROCEDURE — 36415 COLL VENOUS BLD VENIPUNCTURE: CPT

## 2020-06-08 PROCEDURE — 94761 N-INVAS EAR/PLS OXIMETRY MLT: CPT

## 2020-06-08 PROCEDURE — 97161 PT EVAL LOW COMPLEX 20 MIN: CPT

## 2020-06-08 PROCEDURE — 85025 COMPLETE CBC W/AUTO DIFF WBC: CPT

## 2020-06-08 PROCEDURE — 99238 PR HOSPITAL DISCHARGE DAY,<30 MIN: ICD-10-PCS | Mod: ,,, | Performed by: PSYCHIATRY & NEUROLOGY

## 2020-06-08 PROCEDURE — 80053 COMPREHEN METABOLIC PANEL: CPT

## 2020-06-08 RX ORDER — LEVOTHYROXINE SODIUM 200 UG/1
200 TABLET ORAL
Qty: 30 TABLET | Refills: 11 | Status: SHIPPED | OUTPATIENT
Start: 2020-06-08 | End: 2020-08-06

## 2020-06-08 RX ORDER — ASPIRIN 81 MG/1
81 TABLET ORAL DAILY
Refills: 0 | COMMUNITY
Start: 2020-06-09 | End: 2020-08-03 | Stop reason: ALTCHOICE

## 2020-06-08 RX ORDER — ATORVASTATIN CALCIUM 40 MG/1
40 TABLET, FILM COATED ORAL DAILY
Qty: 90 TABLET | Refills: 3 | Status: SHIPPED | OUTPATIENT
Start: 2020-06-09 | End: 2021-11-30 | Stop reason: SDUPTHER

## 2020-06-08 RX ADMIN — ASPIRIN 81 MG: 81 TABLET, COATED ORAL at 08:06

## 2020-06-08 RX ADMIN — ATORVASTATIN CALCIUM 40 MG: 20 TABLET, FILM COATED ORAL at 08:06

## 2020-06-08 RX ADMIN — CLOPIDOGREL BISULFATE 75 MG: 75 TABLET ORAL at 08:06

## 2020-06-08 NOTE — PT/OT/SLP EVAL
"Physical Therapy Evaluation and Discharge Note    Patient Name:  Lorena Rodriguez   MRN:  [id number]    Recommendations:     Discharge Recommendations:  home   Discharge Equipment Recommendations: none   Barriers to discharge: None    Assessment:     Lorena Rodriguez is a 65 y.o. male admitted with a medical diagnosis of Embolic stroke involving right cerebellar artery. Patient demonstrates dysarthria as well as L hemianopsia with good awareness of deficits and ability to compensate with mobility..  At this time, patient is functioning at their prior level of function and does not require further acute PT services.     Recent Surgery: * No surgery found *      Plan:     During this hospitalization, patient does not require further acute PT services.  Please re-consult if situation changes.      Subjective     Chief Complaint: "I can't see on the periphery on my L side", "can you ask the stroke doctor to come back and talk to my wife when she comes?" stroke team notified  Patient/Family Comments/goals:  Return home  Pain/Comfort:  · Pain Rating 1: 0/10    Patients cultural, spiritual, Oriental orthodox conflicts given the current situation: no    Living Environment:  The patient lives in [place] with his wife in a [facility] with 0E, was driving PTA, no DME. R handed. 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   Prior to admission, patients level of function was independent.  Equipment used at home: none.  DME owned (not currently used): none.  Upon discharge, patient will have assistance from wife.    Objective:     Communicated with RN prior to session.  Patient found HOB elevated with peripheral IV, telemetry upon PT entry to room.    General Precautions: Standard, aspiration   Orthopedic Precautions:N/A   Braces: N/A     Exams:    Cognitive Exam  Patient is A&O x4 and follows 100% of one -step commands; L hemianopsia, dysarthria   Fine Motor Coordination   -       Intact rapid alternating movement toe taps, " unable to perform heel to shin 2/2 hip flexibility     Postural Exam Patient presented with the following abnormalities:    -       Rounded shoulders  -       Forward head  -       Kyphosis  -       Posterior pelvic tilt   Sensation    -       Light touch intact AXEL LE   Skin Integrity/Edema     -       Skin integrity: visibly intact  -       Edema: NA   R LE ROM WFL   R LE Strength 5/5 hip flexion, knee ext/flex, and ankle DF/PF   L LE ROM WFL   L LE Strength  5/5 hip flexion, knee ext/flex, and ankle DF/PF     Balance          Static Sitting independent    Dynamic Sitting independent    Static Standing independent    Dynamic Standing supervision assistance  Performed dynamic head turns horizontal and vertical, tandem gait, no evidence of imbalance               Functional Mobility:    Bed Mobility  Found sitting EOB   Transfers Sit to Stand:  independent    Gait  Gait Distance: 120 ft with no AD  Assistance Level: independent   Description: reciprocal strides, good speed and nick          AM-PAC 6 CLICK MOBILITY  Total Score:24       Therapeutic Activities and Exercises:   He is safe to ambulate independently.   Discussed compensation strategies for L hemianopsia, visual scanning with gait- he verbalized and demo'd good understanding.   Patient educated on role of therapy, goals of session, benefits of out of bed mobility. Patient agreeable to mobilize with therapy.  Discussed PT plan of care during hospitalization- discharge skilled PT. Patient educated that they need to call for assistance to mobilize out of bed. Whiteboard updated as appropriate. Patient educated on how their diagnosis impacts their mobility within PT scope of practice.     AM-PAC 6 CLICK MOBILITY  Total Score:24     Patient left up in chair with all lines intact and call button in reach.    GOALS:   Multidisciplinary Problems     Physical Therapy Goals     Not on file          Multidisciplinary Problems (Resolved)        Problem: Physical  Therapy Goal    Goal Priority Disciplines Outcome Goal Variances Interventions   Physical Therapy Goal   (Resolved)     PT, PT/OT Met     Description:  Patient is near functional baseline. He is safe to return home with no PT needs. Discharge PT orders at this time.                     History:     Past Medical History:   Diagnosis Date    Coronary artery disease     Diabetes mellitus     pre-diabetes    Hypertension        Past Surgical History:   Procedure Laterality Date    ARTHROSCOPIC REPAIR OF ROTATOR CUFF OF SHOULDER Right 8/20/2019    Procedure: REPAIR, ROTATOR CUFF, ARTHROSCOPIC;  Surgeon: Nj You MD;  Location: [facility];  Service: Orthopedics;  Laterality: Right;    ARTHROSCOPY OF SHOULDER WITH DECOMPRESSION OF SUBACROMIAL SPACE Right 8/20/2019    Procedure: ARTHROSCOPY, SHOULDER, WITH SUBACROMIAL SPACE DECOMPRESSION;  Surgeon: Nj You MD;  Location: [facility];  Service: Orthopedics;  Laterality: Right;  WITH BLOCK    CHONDROPLASTY OF SHOULDER Right 8/20/2019    Procedure: CHONDROPLASTY, SHOULDER;  Surgeon: Nj You MD;  Location: [facility];  Service: Orthopedics;  Laterality: Right;    CORONARY STENT PLACEMENT      EYE SURGERY      HAND AMPUTATION THROUGH METACARPAL      right    HEMORRHOID SURGERY      HERNIA REPAIR      SHOULDER SURGERY      right       Time Tracking:     PT Received On: 06/08/20  PT Start Time: 0926     PT Stop Time: 0938  PT Total Time (min): 12 min     Billable Minutes: Evaluation 12      Fara Walker, PT  06/08/2020

## 2020-06-08 NOTE — PLAN OF CARE
Problem: SLP Goal  Goal: SLP Goal  Description  Goals expected to be met by 6/14:   1. Pt will tolerate regular diet with thin liquids without overt s/sx airway compromise.  3.  Pt. Will recall speech strategies with no cues  3.  Assess reading and writing skills  4.  IMplement speech strategies in conversation with min-no cues      Outcome: Ongoing, Progressing

## 2020-06-08 NOTE — PLAN OF CARE
Patient medically ready for discharge to home w/ no needs. Any necessary transport setup by . This CM scheduled or requested necessary follow-up appointments.     Future Appointments   Date Time Provider Department Center   7/20/2020  2:00 PM Nj Milian MD [facility]        06/08/20 [id number]   Final Note   Assessment Type Final Discharge Note   Anticipated Discharge Disposition Home   Hospital Follow Up  Appt(s) scheduled? No  (message sent)   Right Care Referral Info   Post Acute Recommendation No Care   Post-Acute Status   Post-Acute Authorization Other   Other Status No Post-Acute Service Needs   Discharge Delays None known at this time       Binta Amor RN  Case Management  Ext: [phone]  06/08/2020  12:26 PM

## 2020-06-08 NOTE — PROGRESS NOTES
[facility]  Vascular Neurology  Comprehensive Stroke Center  Progress Note    Assessment/Plan:     * Embolic stroke involving right cerebellar artery  65 y.o. male with PMHx HTN, HLD, CAD s/p stenting, prediabetes who presented to OSH on 6/4 with L lateral hemianopia, dysarthria, and L facial droop. OSH MRI revealed acute R cerebellar infarct; patient had left AMA before being seen by neurologist. On 6/6, patient presented to [facility] ED with additional visual deficits. tPA not given as patient had recent stroke. Admitted to Vascular Neurology for stroke work up.    MRI Brain demonstrated moderate-sized R superior cerebellum infarct. Stroke etiology ESUS; patient already with ILR in place.    Discussed recommendations extensively with patient and his wife at bedside. Stable for d/c home today with outpatient therapy.       Antithrombotics for secondary stroke prevention: Antiplatelets: Aspirin: 81 mg daily  Clopidogrel: 75 mg daily  Statins for secondary stroke prevention and hyperlipidemia, if present:   Statins: Atorvastatin- 40 mg daily  Aggressive risk factor modification: HTN, DM, CAD  Rehab efforts: The patient has been evaluated by a stroke team provider and the therapy needs have been fully considered based off the presenting complaints and exam findings. The following therapy evaluations are needed: OT evaluate and treat, SLP evaluate and treat  Diagnostics ordered/pending: None   VTE prophylaxis: Heparin 5000 units SQ every 8 hours, place SCDs  BP parameters: Infarct: No intervention, SBP <220, avoid hypotension    Homonymous hemianopsia following cerebrovascular accident  Family and patient report this being a prior deficit from a previous stroke in 8/2019, though had eventually resolved. Evidence of remote R PCA stroke with associated laminar necrosis seen on imaging.  Suspect this deficit likely could represent recrudescence of his prior stroke symptom (possibly in the setting of  dehydration?) Pt states his visual impairment is the same as it was his previous presentation.    OT recommended outpatient therapy; Pt has no PT needs    Essential (primary) hypertension  Stroke risk factor  Keep SBP <220 in setting of acute stroke   Restarted home meds at discharge.  PCP follow-up    Cytotoxic cerebral edema  Area of cytotoxic cerebral edema identified when reviewing brain imaging in the territory of th right cerebellary artery. There is mass effect associated with it. We will continue to monitor the patients clinical exam for any worsening of symptoms which may indicate expansion of the stroke or the area of the edema resulting in the clinical change. The pattern is suggestive of embolic etiology.    Hyperlipemia  -stroke risk factor    LDL 58.2  Continue home Lipitor          6/7/20: MRI with moderate sized right superior cerebellum infarct and subacute right occipital infarct with some hemorrhagic transformation. Stroke work up nearly complete- TTE pending read and needs PT eval.   6/8: Stroke work-up complete. Discussed recommendations extensively with patient and his wife. Stable for d/c home today with outpatient therapy.     STROKE DOCUMENTATION   Acute Stroke Times   Symptom Onset Date: 06/04/20    NIH Scale:  1a. Level of Consciousness: 0-->Alert, keenly responsive  1b. LOC Questions: 0-->Answers both questions correctly  1c. LOC Commands: 0-->Performs both tasks correctly  2. Best Gaze: 0-->Normal  3. Visual: 2-->Complete hemianopia  4. Facial Palsy: 0-->Normal symmetrical movements  5a. Motor Arm, Left: 0-->No drift, limb holds 90 (or 45) degrees for full 10 secs  5b. Motor Arm, Right: 0-->No drift, limb holds 90 (or 45) degrees for full 10 secs  6a. Motor Leg, Left: 0-->No drift, leg holds 30 degree position for full 5 secs  6b. Motor Leg, Right: 0-->No drift, leg holds 30 degree position for full 5 secs  7. Limb Ataxia: 0-->Absent  8. Sensory: 0-->Normal, no sensory loss  9. Best  Language: 0-->No aphasia, normal  10. Dysarthria: 1-->Mild-to-moderate dysarthria, patient slurs at least some words and, at worst, can be understood with some difficulty  11. Extinction and Inattention (formerly Neglect): 0-->No abnormality  Total (NIH Stroke Scale): 3       Modified Goodwin Score: 1  [place] Coma Scale:    ABCD2 Score:    [id number] Score:   HAS -BLED Score:   ICH Score:   Hunt & Mooney Classification:      Hemorrhagic change of an Ischemic Stroke: Does this patient have an ischemic stroke with hemorrhagic changes? No     Neurologic Chief Complaint: visual deficit    Subjective:     Interval History: Patient is seen for follow-up neurological assessment and treatment recommendations:     GABRIEL. Stroke work-up complete. Discussed recommendations extensively with patient and his wife. Stable for d/c home today with outpatient therapy.     HPI, Past Medical, Family, and Social History remains the same as documented in the initial encounter.     Review of Systems   Constitutional: Negative for fatigue and fever.   Eyes: Positive for visual disturbance. Negative for redness.   Neurological: Positive for speech difficulty. Negative for facial asymmetry, weakness and numbness.   Psychiatric/Behavioral: Negative for confusion and decreased concentration.     Scheduled Meds:    Continuous Infusions:  PRN Meds:    Objective:     Vital Signs (Most Recent):  Temp: 96.8 °F (36 °C) (06/08/20 1044)  Pulse: 76 (06/08/20 1044)  Resp: 16 (06/08/20 1044)  BP: (!) 141/72 (06/08/20 1044)  SpO2: (!) 93 % (06/08/20 1044)  BP Location: Right arm    Vital Signs Range (Last 24H):  Temp:  [96.7 °F (35.9 °C)-98.6 °F (37 °C)]   Pulse:  [50-80]   Resp:  [16-18]   BP: (126-174)/(64-93)   SpO2:  [93 %-96 %]   BP Location: Right arm    Physical Exam   Constitutional: He is oriented to person, place, and time. He appears well-developed. No distress.   HENT:   Head: Normocephalic and atraumatic.   Eyes: Conjunctivae and EOM are  normal.   Cardiovascular: Normal rate.   Pulmonary/Chest: Effort normal. No respiratory distress.   Musculoskeletal: Normal range of motion. He exhibits no edema or deformity.   Neurological: He is alert and oriented to person, place, and time. No sensory deficit. He exhibits normal muscle tone.   Skin: Skin is warm and dry. He is not diaphoretic.   Psychiatric: He has a normal mood and affect. His behavior is normal. His speech is slurred.       Neurological Exam:   LOC: alert  Attention Span: Good   Language: No aphasia  Articulation: Dysarthria  Orientation: Person, Place, Time   Visual Fields: Hemianopsia left  EOM (CN III, IV, VI): Full/intact  Facial Movement (CN VII): Symmetric facial expression    Motor: Arm left  Normal 5/5  Leg left  Normal 5/5  Arm right  Normal 5/5  Leg right Normal 5/5  Cebellar: No evidence of appendicular or axial ataxia  Sensation: Intact to light touch, temp  Tone: Normal tone throughout    Laboratory:  CMP:   Recent Labs   Lab 06/08/20  0424   CALCIUM 9.5   ALBUMIN 3.6   PROT 7.3      K 3.5   CO2 24      BUN 16   CREATININE 0.8   ALKPHOS 63   ALT 48*   AST 34   BILITOT 0.5     BMP:   Recent Labs   Lab 06/08/20  0424      K 3.5      CO2 24   BUN 16   CREATININE 0.8   CALCIUM 9.5     CBC:   Recent Labs   Lab 06/08/20  0424   WBC 9.26   RBC 5.42   HGB 14.0   HCT 45.0      MCV 83   MCH 25.8*   MCHC 31.1*     Lipid Panel:   Recent Labs   Lab 06/06/20  0859   CHOL 129   LDLCALC 58.2*   HDL 50   TRIG 104     Coagulation:   Recent Labs   Lab 06/07/20  0237   INR 1.0   APTT 25.2     Platelet Aggregation Study: No results for input(s): PLTAGG, PLTAGINTERP, PLTAGREGLACO, ADPPLTAGGREG in the last 168 hours.  Hgb A1C:   Recent Labs   Lab 06/05/20  0024   HGBA1C 7.0*     TSH:   Recent Labs   Lab 06/06/20  0859   TSH 0.029*       Diagnostic Results     Brain Imaging     MRI Brain WO Contrast 6/6/20    Moderate-sized signal abnormality right superior cerebellum most  compatible with recent infarction.  No significant mass effect or hydrocephalus.  Moderate signal abnormality right parasagittal occipital lobe with susceptibility and T1 shortening along the cortex and underlying vasogenic edema concerning for subacute age cortical hemorrhage and vasogenic edema, however underlying hemorrhagic mass cannot be excluded.  Clinical correlation and follow-up advised.  This could be further characterized with postcontrast imaging as well as CT.  Comparison with prior imaging may be of further diagnostic value      Vessel Imaging     MRA Brain and Neck WO Contrast 6/6/20  MRA head: Unremarkable MRA of the head specifically without evidence for focal stenosis or proximal occlusion.  MRA neck: Irregularity of the carotid bifurcation and proximal ICAs although distorted by motion concerning for atherosclerotic disease with overall less than 50% proximal ICA stenosis by NASCET criteria.      Cardiac Imaging     TTE 6/6/20  · Normal left ventricular systolic function. The estimated ejection fraction is 60%.  · Normal LV diastolic function.  · No wall motion abnormalities.  · Normal right ventricular systolic function.  · Normal central venous pressure (3 mmHg).      Mira Jacome PA-C  Comprehensive Stroke Center  Department of Vascular Neurology   [facility]

## 2020-06-08 NOTE — ASSESSMENT & PLAN NOTE
Family and patient report this being a prior deficit from a previous stroke in 8/2019, though had eventually resolved. Evidence of remote R PCA stroke with associated laminar necrosis seen on imaging.  Suspect this deficit likely could represent recrudescence of his prior stroke symptom (possibly in the setting of dehydration?) Pt states his visual impairment is the same as it was his previous presentation.    OT recommended outpatient therapy; Pt has no PT needs

## 2020-06-08 NOTE — PLAN OF CARE
Problem: Fall Injury Risk  Goal: Absence of Fall and Fall-Related Injury  Outcome: Ongoing, Progressing     Problem: Adult Inpatient Plan of Care  Goal: Plan of Care Review  Outcome: Ongoing, Progressing     Patient is AAO x4. POC reviewed with patient. Patient verbalized understanding. Patient's breathing is unlabored with equal chest expansion. Patient denies any numbness and tingling. Patient ambulates to the restroom. Patient remained free from falls. Patient rested well through shift. Bed in lowest position,bed alarm on, side rails up x3, no complaints or signs of distress. WCTM.

## 2020-06-08 NOTE — PLAN OF CARE
Problem: Physical Therapy Goal  Goal: Physical Therapy Goal  Description  Patient is near functional baseline. He is safe to return home with no PT needs. Discharge PT orders at this time.    Outcome: Met   Fara Walker, PT  6/8/2020

## 2020-06-08 NOTE — PT/OT/SLP EVAL
"Speech Language Pathology Evaluation  Cognitive Communication    Patient Name:  Lorena Rodriguez   MRN:  [id number]  Admitting Diagnosis: Embolic stroke involving right cerebellar artery    Recommendations:     Recommendations:                General Recommendations:  Speech/language therapy  Diet recommendations:  Regular, Thin   Aspiration Precautions: Standard aspiration precautions   General Precautions: Standard, aspiration  Communication strategies:  none    History:     Past Medical History:   Diagnosis Date    Coronary artery disease     Diabetes mellitus     pre-diabetes    Hypertension        Past Surgical History:   Procedure Laterality Date    ARTHROSCOPIC REPAIR OF ROTATOR CUFF OF SHOULDER Right 8/20/2019    Procedure: REPAIR, ROTATOR CUFF, ARTHROSCOPIC;  Surgeon: Nj You MD;  Location: [facility];  Service: Orthopedics;  Laterality: Right;    ARTHROSCOPY OF SHOULDER WITH DECOMPRESSION OF SUBACROMIAL SPACE Right 8/20/2019    Procedure: ARTHROSCOPY, SHOULDER, WITH SUBACROMIAL SPACE DECOMPRESSION;  Surgeon: Nj You MD;  Location: [facility];  Service: Orthopedics;  Laterality: Right;  WITH BLOCK    CHONDROPLASTY OF SHOULDER Right 8/20/2019    Procedure: CHONDROPLASTY, SHOULDER;  Surgeon: Nj You MD;  Location: [facility];  Service: Orthopedics;  Laterality: Right;    CORONARY STENT PLACEMENT      EYE SURGERY      HAND AMPUTATION THROUGH METACARPAL      right    HEMORRHOID SURGERY      HERNIA REPAIR      SHOULDER SURGERY      right       Social History: Patient lives with wife.      Prior diet: regular with thin        Subjective     "I sound better than yesterday"  Per pt  Patient goals: home     Pain/Comfort:  · Pain Rating 1: 0/10  · Pain Rating Post-Intervention 1: 0/10    Objective:   Cognitive Status:    Pt. was oriented x3 with good recall of recent and remote temporal and general information.  Immediate/delayed verbal recall was wfl with pt. Repeating 7 digits and 5 words " without difficulty.  Pt.Responses to hypothetical verbal problem solving tasks were accurate and complete.  Pt. Compared and contrasted objects and generated multiple solutions to problems.  Thought organization and categorization skills were wfl with pt. Naming 15 animals given one minute when 15-20 are wnl.  Pt. Responded to functional math and time calculations with 100% accuracy and sequenced 4 words presented auditorily on 2/2 trials.        Receptive Language:   Comprehension:   Pt. Responded to complex yes/no questions and multi step commands with 100% accuracy and no delays in responding.        Pragmatics:    wfl    Expressive Language:  Verbal:    Verbal language skills were wfl with no evidence of aphasia.  Pt. Expressed their thoughts coherently in conversation with no evidence of confusion or word finding deficits    Motor Speech:  Mild dysarthria    Voice:   wfl    Visual-Spatial:  tba    Reading:   tba     Written Expression:   tba        Assessment:   Lorena Rodriguez is a 65 y.o. male with an SLP diagnosis of Dysarthria.  He presents with m;ild impairment.    Goals:   Multidisciplinary Problems     SLP Goals        Problem: SLP Goal    Goal Priority Disciplines Outcome   SLP Goal     SLP Ongoing, Progressing   Description:  Goals expected to be met by 6/14:   1. Pt will tolerate regular diet with thin liquids without overt s/sx airway compromise.  3.  Pt. Will recall speech strategies with no cues  3.  Assess reading and writing skills  4.  IMplement speech strategies in conversation with min-no cues                       Plan:   · Patient to be seen:  3 x/week   · Plan of Care expires:  07/06/20  · Plan of Care reviewed with:  patient   · SLP Follow-Up:  Yes       Discharge recommendations:  Discharge Facility/Level of Care Needs: outpatient speech therapy   Barriers to Discharge:  None    Time Tracking:   SLP Treatment Date:   06/08/20  Speech Start Time:  0745  Speech Stop Time:  0800     Speech Total  Time (min):  15 min    Billable Minutes: Eval 15     Galilea Munoz MA, CCC-SLP  06/08/2020

## 2020-06-08 NOTE — ASSESSMENT & PLAN NOTE
Stroke risk factor  Keep SBP <220 in setting of acute stroke   Restarted home meds at discharge.  PCP follow-up

## 2020-06-08 NOTE — ASSESSMENT & PLAN NOTE
65 y.o. male with PMHx HTN, HLD, CAD s/p stenting, prediabetes who presented to OSH on 6/4 with L lateral hemianopia, dysarthria, and L facial droop. OSH MRI revealed acute R cerebellar infarct; patient had left AMA before being seen by neurologist. On 6/6, patient presented to [facility] ED with additional visual deficits. tPA not given as patient had recent stroke. Admitted to Vascular Neurology for stroke work up.    MRI Brain demonstrated moderate-sized R superior cerebellum infarct. Stroke etiology ESUS; patient already with ILR in place.    Discussed recommendations extensively with patient and his wife at bedside. Stable for d/c home today with outpatient therapy.       Antithrombotics for secondary stroke prevention: Antiplatelets: Aspirin: 81 mg daily  Clopidogrel: 75 mg daily  Statins for secondary stroke prevention and hyperlipidemia, if present:   Statins: Atorvastatin- 40 mg daily  Aggressive risk factor modification: HTN, DM, CAD  Rehab efforts: The patient has been evaluated by a stroke team provider and the therapy needs have been fully considered based off the presenting complaints and exam findings. The following therapy evaluations are needed: OT evaluate and treat, SLP evaluate and treat  Diagnostics ordered/pending: None   VTE prophylaxis: Heparin 5000 units SQ every 8 hours, place SCDs  BP parameters: Infarct: No intervention, SBP <220, avoid hypotension

## 2020-06-08 NOTE — PLAN OF CARE
Patient aaox4, vss, contx2, POC explained patient verbalized understanding, waiting for the attending regarding POC

## 2020-06-08 NOTE — PLAN OF CARE
CM met with patient in room for Discharge Planning Assessment.  Per patient,  patient lives with spouse in a(n) SS with 1 steps to enter.   Per patient, patient was independent with ADLS and used no DME for ambulation.  Per patient, the patient will have assistance from family upon discharge.  Discharge Planning Booklet given to patient/family and discussed.  All questions addressed.       PCP:  Sha Soliman MD      Pharmacy:    [id number] - [place] - [address] AT [facility] [address]  [address] [place] [address]  Phone: [phone] Fax: [phone]        Emergency Contacts:  Extended Emergency Contact Information  Primary Emergency Contact: Jocelin Rodriguez  Address: [address] [place] [address] [facility]  Home Phone: [phone]  Mobile Phone: [phone]  Relation: Spouse      Insurance:    Payor: MEDICARE / Plan: MEDICARE PART A & B / Product Type: [facility] /         06/08/20 1220   Discharge Assessment   Assessment Type Discharge Planning Assessment   Confirmed/corrected address and phone number on facesheet? Yes   Assessment information obtained from? Patient   Expected Length of Stay (days) 2   Communicated expected length of stay with patient/caregiver yes   Prior to hospitilization cognitive status: Alert/Oriented   Prior to hospitalization functional status: Independent   Current cognitive status: Alert/Oriented   Current Functional Status: Independent   Lives With spouse   Able to Return to Prior Arrangements yes   Is patient able to care for self after discharge? Yes   Who are your caregiver(s) and their phone number(s)? Jocelin Rodriguez Spouse [phone]    Patient's perception of discharge disposition home or selfcare   Readmission Within the Last 30 Days no previous admission in last 30 days   Patient currently being followed by outpatient case management? No   Patient currently receives any other outside  agency services? No   Equipment Currently Used at Home glucometer   Do you have any problems affording any of your prescribed medications? No   Is the patient taking medications as prescribed? yes   Does the patient have transportation home? Yes   Transportation Anticipated family or friend will provide  (spouse)   Does the patient receive services at the Coumadin Clinic? No   Discharge Plan A Home with family   Discharge Plan B Home with family   DME Needed Upon Discharge  none   Patient/Family in Agreement with Plan yes         Binta Amor RN  Case Management  Ext: [phone]  06/08/2020  12:25 PM

## 2020-06-08 NOTE — SUBJECTIVE & OBJECTIVE
Neurologic Chief Complaint: visual deficit    Subjective:     Interval History: Patient is seen for follow-up neurological assessment and treatment recommendations:     GABRIEL. Stroke work-up complete. Discussed recommendations extensively with patient and his wife. Stable for d/c home today with outpatient therapy.     HPI, Past Medical, Family, and Social History remains the same as documented in the initial encounter.     Review of Systems   Constitutional: Negative for fatigue and fever.   Eyes: Positive for visual disturbance. Negative for redness.   Neurological: Positive for speech difficulty. Negative for facial asymmetry, weakness and numbness.   Psychiatric/Behavioral: Negative for confusion and decreased concentration.     Scheduled Meds:    Continuous Infusions:  PRN Meds:    Objective:     Vital Signs (Most Recent):  Temp: 96.8 °F (36 °C) (06/08/20 1044)  Pulse: 76 (06/08/20 1044)  Resp: 16 (06/08/20 1044)  BP: (!) 141/72 (06/08/20 1044)  SpO2: (!) 93 % (06/08/20 1044)  BP Location: Right arm    Vital Signs Range (Last 24H):  Temp:  [96.7 °F (35.9 °C)-98.6 °F (37 °C)]   Pulse:  [50-80]   Resp:  [16-18]   BP: (126-174)/(64-93)   SpO2:  [93 %-96 %]   BP Location: Right arm    Physical Exam   Constitutional: He is oriented to person, place, and time. He appears well-developed. No distress.   HENT:   Head: Normocephalic and atraumatic.   Eyes: Conjunctivae and EOM are normal.   Cardiovascular: Normal rate.   Pulmonary/Chest: Effort normal. No respiratory distress.   Musculoskeletal: Normal range of motion. He exhibits no edema or deformity.   Neurological: He is alert and oriented to person, place, and time. No sensory deficit. He exhibits normal muscle tone.   Skin: Skin is warm and dry. He is not diaphoretic.   Psychiatric: He has a normal mood and affect. His behavior is normal. His speech is slurred.       Neurological Exam:   LOC: alert  Attention Span: Good   Language: No aphasia  Articulation:  Dysarthria  Orientation: Person, Place, Time   Visual Fields: Hemianopsia left  EOM (CN III, IV, VI): Full/intact  Facial Movement (CN VII): Symmetric facial expression    Motor: Arm left  Normal 5/5  Leg left  Normal 5/5  Arm right  Normal 5/5  Leg right Normal 5/5  Cebellar: No evidence of appendicular or axial ataxia  Sensation: Intact to light touch, temp  Tone: Normal tone throughout    Laboratory:  CMP:   Recent Labs   Lab 06/08/20  0424   CALCIUM 9.5   ALBUMIN 3.6   PROT 7.3      K 3.5   CO2 24      BUN 16   CREATININE 0.8   ALKPHOS 63   ALT 48*   AST 34   BILITOT 0.5     BMP:   Recent Labs   Lab 06/08/20  0424      K 3.5      CO2 24   BUN 16   CREATININE 0.8   CALCIUM 9.5     CBC:   Recent Labs   Lab 06/08/20  0424   WBC 9.26   RBC 5.42   HGB 14.0   HCT 45.0      MCV 83   MCH 25.8*   MCHC 31.1*     Lipid Panel:   Recent Labs   Lab 06/06/20  0859   CHOL 129   LDLCALC 58.2*   HDL 50   TRIG 104     Coagulation:   Recent Labs   Lab 06/07/20  0237   INR 1.0   APTT 25.2     Platelet Aggregation Study: No results for input(s): PLTAGG, PLTAGINTERP, PLTAGREGLACO, ADPPLTAGGREG in the last 168 hours.  Hgb A1C:   Recent Labs   Lab 06/05/20  0024   HGBA1C 7.0*     TSH:   Recent Labs   Lab 06/06/20  0859   TSH 0.029*       Diagnostic Results     Brain Imaging     MRI Brain WO Contrast 6/6/20    Moderate-sized signal abnormality right superior cerebellum most compatible with recent infarction.  No significant mass effect or hydrocephalus.  Moderate signal abnormality right parasagittal occipital lobe with susceptibility and T1 shortening along the cortex and underlying vasogenic edema concerning for subacute age cortical hemorrhage and vasogenic edema, however underlying hemorrhagic mass cannot be excluded.  Clinical correlation and follow-up advised.  This could be further characterized with postcontrast imaging as well as CT.  Comparison with prior imaging may be of further diagnostic  value      Vessel Imaging     MRA Brain and Neck WO Contrast 6/6/20  MRA head: Unremarkable MRA of the head specifically without evidence for focal stenosis or proximal occlusion.  MRA neck: Irregularity of the carotid bifurcation and proximal ICAs although distorted by motion concerning for atherosclerotic disease with overall less than 50% proximal ICA stenosis by NASCET criteria.      Cardiac Imaging     TTE 6/6/20  · Normal left ventricular systolic function. The estimated ejection fraction is 60%.  · Normal LV diastolic function.  · No wall motion abnormalities.  · Normal right ventricular systolic function.  · Normal central venous pressure (3 mmHg).

## 2020-06-09 ENCOUNTER — PATIENT OUTREACH (OUTPATIENT)
Dept: ADMINISTRATIVE | Facility: CLINIC | Age: 65
End: 2020-06-09

## 2020-06-09 NOTE — ASSESSMENT & PLAN NOTE
Family and patient report this being a prior deficit from a previous stroke in 8/2019, though it had eventually improved. Evidence of remote R PCA stroke with associated laminar necrosis now seen on imaging.  Suspect this deficit likely could represent recrudescence of his prior stroke symptom (possibly in the setting of dehydration? New stroke event?) Pt states his visual impairment is the same now as it was his previous presentation.    OT recommended outpatient therapy; Pt has no PT needs. OT to eventually clear pt for driving, as able; patient conveyed understanding.

## 2020-06-09 NOTE — ASSESSMENT & PLAN NOTE
65 y.o. male with PMHx HTN, HLD, CAD s/p stenting, prediabetes who presented to OSH on 6/4 with L lateral hemianopia, dysarthria, and L facial droop. OSH MRI revealed acute R cerebellar infarct; patient had left AMA before being seen by neurologist. On 6/6, patient presented to [facility] ED with additional visual deficits. tPA not given as patient had recent stroke. Admitted to Vascular Neurology for stroke work up.    MRI Brain demonstrated moderate-sized R superior cerebellum infarct. Stroke etiology ESUS; patient already with ILR in place (pt's cardiologist, Dr. Pyle.)      Antithrombotics for secondary stroke prevention: Antiplatelets: Aspirin: 81 mg daily  Clopidogrel: 75 mg daily  Statins for secondary stroke prevention and hyperlipidemia, if present:   Statins: Atorvastatin- 40 mg daily  Aggressive risk factor modification: HTN, DM, CAD  Rehab efforts: Outpatient OT

## 2020-06-09 NOTE — ASSESSMENT & PLAN NOTE
Area of cytotoxic cerebral edema identified when reviewing brain imaging in the territory of th right cerebellary artery. There is mass effect associated with it.   The patients clinical exam remained without any worsening of symptoms which may indicate expansion of the stroke or the area of the edema resulting in the clinical change. The pattern is suggestive of embolic etiology.

## 2020-06-09 NOTE — DISCHARGE SUMMARY
[facility]  Vascular Neurology  Comprehensive Stroke Center  Discharge Summary     Summary:     Admit Date: 6/6/2020  8:36 AM    Discharge Date and Time: 6/8/2020  2:12 PM    Attending Physician: Mc Armendariz MD    Discharge Provider: Mira Jacome PA-C    History of Present Illness: Lorena Rodriguez is a 65 y.o. male with PMHx CAD, DM, HTN who is being evaluated by the Vascular Neurology service after developing worsening visual deficit. Pt was LKN at approximately 6/4. 6/4, patient had near syncopal event while cutting grass, associated with left hemianopsia, mild dysarthria, and left facial droop. Patient presented to [facility]. MRI completed at OSH demonstrated acute right cerebellar infarct. Patient was continued on ASA, plavix, and Lipitor. Patient left AMA before being seen by neurologist. Today, patient noted right eye hemianopsia. Patient presented to [facility] ED for evaluation.     Pt is taking DAPT at home. He lives with others and performs all ADLs independently.     Hospital Course (synopsis of major diagnoses, care, treatment, and services provided during the course of the hospital stay): Mr. Lorena Rodriguez was admitted to Vascular Neurology for acute stroke workup. Stroke etiology suspected to be ESUS at this time. [Please see plan section below for further details.]  Patient discharged with recommendations for outpatient therapy. Family at bedside amenable to plan.     Patient with improvement in stroke symptoms since admission. Inpatient acute stroke work up completed and patient stable for discharge. Please see all appropriate medication changes, imaging results, and necessary follow-up below.    Stroke Etiology: Undetermined Cause. Cryptogenic Embolism / ESUS (Embolic Stroke of Undetermined Source)    STROKE DOCUMENTATION   Acute Stroke Times   Symptom Onset Date: 06/04/20     NIH Scale:  1a. Level of Consciousness: 0-->Alert, keenly responsive  1b. LOC Questions:  0-->Answers both questions correctly  1c. LOC Commands: 0-->Performs both tasks correctly  2. Best Gaze: 0-->Normal  3. Visual: 2-->Complete hemianopia  4. Facial Palsy: 0-->Normal symmetrical movements  5a. Motor Arm, Left: 0-->No drift, limb holds 90 (or 45) degrees for full 10 secs  5b. Motor Arm, Right: 0-->No drift, limb holds 90 (or 45) degrees for full 10 secs  6a. Motor Leg, Left: 0-->No drift, leg holds 30 degree position for full 5 secs  6b. Motor Leg, Right: 0-->No drift, leg holds 30 degree position for full 5 secs  7. Limb Ataxia: 0-->Absent  8. Sensory: 0-->Normal, no sensory loss  9. Best Language: 0-->No aphasia, normal  10. Dysarthria: 1-->Mild-to-moderate dysarthria, patient slurs at least some words and, at worst, can be understood with some difficulty  11. Extinction and Inattention (formerly Neglect): 0-->No abnormality  Total (NIH Stroke Scale): 3        Modified Rainer Score: 2  [place] Coma Scale:    ABCD2 Score:    [id number] Score:   HAS -BLED Score:   ICH Score:   Hunt & Mooney Classification:       Assessment/Plan:     Diagnostic Results:    Brain Imaging:     MRI Brain WO Contrast 6/6/20    Moderate-sized signal abnormality right superior cerebellum most compatible with recent infarction.  No significant mass effect or hydrocephalus.  Moderate signal abnormality right parasagittal occipital lobe with susceptibility and T1 shortening along the cortex and underlying vasogenic edema concerning for subacute age cortical hemorrhage and vasogenic edema, however underlying hemorrhagic mass cannot be excluded.  Clinical correlation and follow-up advised.  This could be further characterized with postcontrast imaging as well as CT.  Comparison with prior imaging may be of further diagnostic value        Vessel Imaging      MRA Brain and Neck WO Contrast 6/6/20  MRA head: Unremarkable MRA of the head specifically without evidence for focal stenosis or proximal occlusion.  MRA neck: Irregularity of  the carotid bifurcation and proximal ICAs although distorted by motion concerning for atherosclerotic disease with overall less than 50% proximal ICA stenosis by NASCET criteria.        Cardiac Imaging      TTE 6/6/20  · Normal left ventricular systolic function. The estimated ejection fraction is 60%.  · Normal LV diastolic function.  · No wall motion abnormalities.  · Normal right ventricular systolic function.  · Normal central venous pressure (3 mmHg).      Interventions: None    Complications: None    Disposition: Home or Self Care    Final Active Diagnoses:    Diagnosis Date Noted POA    PRINCIPAL PROBLEM:  Embolic stroke involving right cerebellar artery [I63.441] 06/06/2020 Yes    Homonymous hemianopsia following cerebrovascular accident [I69.398, H53.469] 06/06/2020 Not Applicable    Essential (primary) hypertension [I10] 06/06/2020 Yes    Cytotoxic cerebral edema [G93.6] 06/07/2020 Yes    Hyperlipemia [E78.5] 06/06/2020 Yes      Problems Resolved During this Admission:     * Embolic stroke involving right cerebellar artery  65 y.o. male with PMHx HTN, HLD, CAD s/p stenting, prediabetes who presented to OSH on 6/4 with L lateral hemianopia, dysarthria, and L facial droop. OSH MRI revealed acute R cerebellar infarct; patient had left AMA before being seen by neurologist. On 6/6, patient presented to [facility] ED with additional visual deficits. tPA not given as patient had recent stroke. Admitted to Vascular Neurology for stroke work up.    MRI Brain demonstrated moderate-sized R superior cerebellum infarct. Stroke etiology ESUS; patient already with ILR in place (pt's cardiologist, Dr. Pyle.)      Antithrombotics for secondary stroke prevention: Antiplatelets: Aspirin: 81 mg daily  Clopidogrel: 75 mg daily  Statins for secondary stroke prevention and hyperlipidemia, if present:   Statins: Atorvastatin- 40 mg daily  Aggressive risk factor modification: HTN, DM, CAD  Rehab efforts: Outpatient  OT    Homonymous hemianopsia following cerebrovascular accident  Family and patient report this being a prior deficit from a previous stroke in 8/2019, though it had eventually improved. Evidence of remote R PCA stroke with associated laminar necrosis now seen on imaging.  Suspect this deficit likely could represent recrudescence of his prior stroke symptom (possibly in the setting of dehydration? New stroke event?) Pt states his visual impairment is the same now as it was his previous presentation.    OT recommended outpatient therapy; Pt has no PT needs. OT to eventually clear pt for driving, as able; patient conveyed understanding.    Essential (primary) hypertension  Stroke risk factor  SBP <220 acutely; SBP < 140 long-term  Restarted home meds at discharge.  PCP follow-up    Cytotoxic cerebral edema  Area of cytotoxic cerebral edema identified when reviewing brain imaging in the territory of th right cerebellary artery. There is mass effect associated with it.   The patients clinical exam remained without any worsening of symptoms which may indicate expansion of the stroke or the area of the edema resulting in the clinical change. The pattern is suggestive of embolic etiology.    Hyperlipemia  -stroke risk factor  LDL 58.2 (at goal)  Continue home Lipitor         Recommendations:     Post-discharge complication risks: Falls    Stroke Education given to: patient and family    Follow-up in Stroke Clinic in 4-8 weeks.     Discharge Plan:  Antithrombotics: Aspirin 81mg, Clopidogrel 75mg  Statin: Atorvastatin 40mg  Aggresive risk factor modification:  Hypertension  Diabetes  High Cholesterol  Diet  Exercise  CAD    Follow Up:  Follow-up Information     Sha Soliman MD In 1 week.    Specialty:  Family Medicine  Why:  Call your PCP to schedule a hospital follow-up appt within 1 week of discharge.  Contact information:  [address] [place] [address]  [id number]  [place] [id number] [phone]             [facility]  VASCULAR NEUROLOGY In 6 weeks.    Specialty:  Vascular Neurology  Why:  Someone will call you to arrange hospital follow-up in 4-8 weeks. If nobody calls within 1 week, call number above to schedule an appt.  Contact information:  [place] [address]  [facility] [id number] [phone]                 Patient Instructions:      Ambulatory referral/consult to Physical/Occupational Therapy   Standing Status: Future   Referral Priority: Routine Referral Type: Physical Medicine   Referral Reason: Specialty Services Required   Requested Specialty: Occupational Therapy   Number of Visits Requested: 1     Ambulatory referral/consult to Speech Therapy   Standing Status: Future   Referral Priority: Routine Referral Type: Speech Therapy   Referral Reason: Specialty Services Required   Requested Specialty: Speech Pathology   Number of Visits Requested: 1       Medications:  Reconciled Home Medications:      Medication List      START taking these medications    aspirin 81 MG EC tablet  Commonly known as:  ECOTRIN  Take 1 tablet (81 mg total) by mouth once daily.     atorvastatin 40 MG tablet  Commonly known as:  LIPITOR  Take 1 tablet (40 mg total) by mouth once daily.        CHANGE how you take these medications    levothyroxine 200 MCG tablet  Commonly known as:  SYNTHROID  Take 1 tablet (200 mcg total) by mouth before breakfast.  What changed:  when to take this        CONTINUE taking these medications    amLODIPine 10 MG tablet  Commonly known as:  NORVASC  Take 10 mg by mouth once daily.     clopidogreL 75 mg tablet  Commonly known as:  PLAVIX  Take 75 mg by mouth once daily.     co-enzyme Q-10 30 mg capsule  Take 30 mg by mouth once daily.     metFORMIN 500 MG tablet  Commonly known as:  GLUCOPHAGE  Take 500 mg by mouth 2 (two) times daily with meals.     metoprolol succinate 50 MG 24 hr tablet  Commonly known as:  TOPROL-XL  Take 50 mg by mouth once daily.     NITROGLYCERIN SL  Place 0.4 mg under the tongue daily  as needed.     olmesartan-hydrochlorothiazide 20-12.5 mg per tablet  Commonly known as:  BENICAR HCT  Take 1 tablet by mouth once daily.     ondansetron 4 MG Tbdl  Commonly known as:  ZOFRAN-ODT  Take 2 tablets (8 mg total) by mouth every 8 (eight) hours as needed.     oxyCODONE-acetaminophen  mg per tablet  Commonly known as:  PERCOCET  Take 1 tablet by mouth every 4 (four) hours as needed.            Mira Jacome PA-C  [facility]  Department of Vascular Neurology   [facility]

## 2020-06-09 NOTE — ASSESSMENT & PLAN NOTE
Stroke risk factor  SBP <220 acutely; SBP < 140 long-term  Restarted home meds at discharge.  PCP follow-up

## 2020-06-09 NOTE — PATIENT INSTRUCTIONS
Stroke (Completed)    You have had a mild stroke, or cerebrovascular accident (CVA). This is caused by a loss of blood flow to part of your brain. This can occur when a blood clot forms inside the carotid artery (main artery from the heart to the brain) or inside the heart. When the clot travels to the brain, it can lodge in a blood vessel and block blood flow. The other common cause of stroke is a gradual narrowing of the arteries in the brain due to buildup of fatty deposits (plaque).  Symptoms  Blocked blood flow in different areas of the brain can cause different symptoms. If you have had a stroke before, a new one may be different. A memory aid for the basic signs of a stroke is F.A.S.T.  F.A.S.T.  · F: Face drooping, or numbness on one side. This may be more noticeable when you ask the affected person to smile.  · A: Arm weakness or numbness. The affected person may have trouble using or lifting one side.  · S: Speech difficulty. Speech may be slurred or hard to understand. The affected person may also use the wrong words.  · T: Time to call 911. Time is critical in treating a stroke. Call 911 as soon as you suspect a stroke has happened--even a small one. The sooner treatment is started the better, even if the symptoms go away.  Other common symptoms of a stroke include:  · Having difficulty getting the right words to come out  · Weakness in one leg  · Numbness on one side  · Difficulty walking  · Trouble with coordination  · Trouble with vision  · Headache  · Confusion  · Dizziness  Treatment  After you have had a stroke, you are at risk of having another. Be sure to follow up with your healthcare provider for further evaluation and treatment. If problems are found, your healthcare provider will recommend treatment with medicines and/or procedures.  To reduce your chance of having another stroke, you may be prescribed medicines. These include medicines to prevent blood clots, such as antiplatelet or  anticoagulant medicines.  Home care  · Rest at home and avoid exertion for the next few days.  · If your healthcare provider has prescribed medicines, take them as directed.  Follow-up care  Follow up with your healthcare provider, or as advised. Additional tests may be needed. If you had an X-ray, CT scan, MRI, or ECG (electrocardiogram), it will be reviewed by a specialist. You will be notified of any new findings that will affect your care.  Call 911  Contact emergency services right away if any of these occur:  · Any of your stroke symptoms worsen  · New problems with speech, confusion, vision, walking, coordination, facial droop, or weakness or numbness on one side of your body  · Severe headache, fainting spell, dizziness, or seizure  · Chest pain or shortness of breath  Remember F.A.S.T. (described above). If you notice warning signs and symptoms of stroke, CALL 911 without delay.  Date Last Reviewed: 9/21/2020  © 5787-8995 [employer]. [address] [place] [address]. All rights reserved. This information is not intended as a substitute for professional medical care. Always follow your healthcare professional's instructions.        Discharge Instructions for Stroke  You have been diagnosed with a stroke, or with a TIA (transient ischemic attack). Or you have been identified as having a high risk for stroke. During a stroke, blood stops flowing to part of your brain. This can damage areas in the brain that control other parts of the body. Symptoms after a stroke depend on which part of the brain has been affected.  Stroke risk factors  Once youve had a stroke, youre at greater risk for another one. Listed below are some other factors that can increase your risk for a stroke:  · High blood pressure  · High cholesterol  · Cigarette or cigar smoking  · Diabetes  · Carotid or other artery disease  · Atrial fibrillation, atrial flutter, or other heart disease  · Not being physically  active  · Obesity  · Certain blood disorders (such as sickle cell anemia)  · Excessive alcohol use  · Abuse of street drugs  · Race  · Gender  · Family history of stroke  · Diet high in salty, fried, or greasy foods  Changes in daily living  Doing your regular tasks may be difficult after youve had a stroke, but you can learn new ways to manage your daily activities. In fact, doing daily activities may help you to regain muscle strength and bring back function to affected limbs. Be patient, give yourself time to adjust, and appreciate the progress you make.  Daily activities  You may be at risk of falling. Make changes to your home to help you walk more easily. A therapist will decide if you need an assistive device to walk safely.  You may need to see an occupational therapist or physical therapist to learn new ways of doing things. For example, you may need to make adjustments when bathing or dressing:  Tips for showering or bathing  · Test the water temperature with a hand or foot that was not affected by the stroke.  · Use grab bars, a shower seat, a hand-held showerhead, and a long-handled brush.  Tips for getting dressed  · Dress while sitting, starting with the affected side or limb.  · Wear shirts that pull easily over your head. Wear pants or skirts with elastic waistbands.  · Use zippers with loops attached to the pull tabs.  Lifestyle changes  · Take your medicines exactly as directed. Dont skip doses.  · Begin an exercise program. Ask your provider how to get started. Also ask how much activity you should try to get on a daily or weekly basis. You can benefit from simple activities such as walking or gardening.  · Limit alcohol intake. Men should have no more than 2 alcoholic drinks a day. Women should limit themselves to 1 alcoholic drink per day.  · Know your cholesterol level. Follow your providers recommendations about how to keep cholesterol under control.  · If you are a smoker, quit now. Join a  stop-smoking program to improve your chances of success. Ask your provider about medicines or other methods to help you quit.  · Learn stress management techniques to help you deal with stress in your home and work life.  Diet  Your healthcare provider will give you information on dietary changes that you may need to make, based on your situation. Your provider may recommend that you see a registered dietitian for help with diet changes. Changes may include:  · Reducing fat and cholesterol intake  · Reducing salt (sodium) intake, especially if you have high blood pressure  · Eating more fresh vegetables and fruits  · Eating more lean proteins, such as fish, poultry, and beans and peas (legumes)  · Eating less red meat and processed meats  · Using low-fat dairy products  · Limiting vegetable oils and nut oils  · Limiting sweets and processed foods such as chips, cookies, and baked goods  Follow-up care  · Keep your medical appointments. Close follow-up is important to stroke rehabilitation and recovery.  · Some medicines require blood tests to check for progress or problems. Keep follow-up appointments for any blood tests ordered by your providers.  When to call 911  Call 911 right away if you have any of the following symptoms of stroke:  · Weakness, tingling, or loss of feeling on one side of your face or body  · Sudden double vision or trouble seeing in one or both eyes  · Sudden trouble talking or slurred speech  · Trouble understanding others  · Sudden, severe headache  · Dizziness, loss of balance, or a sense of falling  · Blackouts or seizures      F.A.S.T. is an easy way to remember the signs of stroke. When you see these signs, you know that you need to call 911 fast.  F.A.S.T. stands for:  · F is for face drooping. One side of the face is drooping or numb. When the person smiles, the smile is uneven.  · A is for arm weakness. One arm is weak or numb. When the person lifts both arms at the same time, one arm  may drift downward.  · S is for speech difficulty. You may notice slurred speech or trouble speaking. The person can't repeat a simple sentence correctly when asked.  · T is for time to call 911. If someone shows any of these symptoms, even if they go away, call 911 immediately. Make note of the time the symptoms first appeared.  Date Last Reviewed: 8/26/2020  © 4366-8534 [employer]. [address] [place] [address], [place] [address]. All rights reserved. This information is not intended as a substitute for professional medical care. Always follow your healthcare professional's instructions.

## 2020-06-10 ENCOUNTER — CLINICAL SUPPORT (OUTPATIENT)
Dept: REHABILITATION | Facility: HOSPITAL | Age: 65
End: 2020-06-10
Payer: MEDICARE

## 2020-06-10 DIAGNOSIS — I63.441 EMBOLIC STROKE INVOLVING RIGHT CEREBELLAR ARTERY: ICD-10-CM

## 2020-06-10 PROCEDURE — 92522 EVALUATE SPEECH PRODUCTION: CPT | Mod: PN | Performed by: SPEECH-LANGUAGE PATHOLOGIST

## 2020-06-10 PROCEDURE — 92610 EVALUATE SWALLOWING FUNCTION: CPT | Mod: PN | Performed by: SPEECH-LANGUAGE PATHOLOGIST

## 2020-06-10 NOTE — PATIENT INSTRUCTIONS
"Motor Speech Strategies:  · Speak Slowly / Pacing: Try to slow your rate of speech when talking while keeping the same intonation or sing-song quality that is natural to your voice.   · Overarticulation: Over-say all of the sounds in your words.  · Speak Loudly: make sure to project your voice so that others can hear you.  · Deep Breath: Make sure to use deep breathing to support your speech. If you do not have enough breath support, it is difficult to over-articulate, speak loudly, or speak slowly.   · Open Your Mouth: Make sure to open your mouth widely while speaking.       Oral Motor Exercises  Face:   · Raise eyebrows  · [place]  · Shut eyes   · Wrinkle nose     Lips   · Open and close mouth  · Move corners of mouth from side to side  · Smile with lips together   · Pucker lips (as if to whistle) and press against tongue depressor  · Suck in lips and release with a smack  · Keep teeth together and say "ba, be, solis" "pa, pee, poo", and "ma, me, moo"    Tongue  · Stick tongue out and press against tongue depressor, do not rest on lips   · Move tongue side to side pressing against tongue depressor on side of tongue  · Move tongue up and down to touch chin and nose  · Move tongue to touch the top, bottom, and corner of lips  · Move tongue in a [place] touching all of the upper lip, corners and lower lips.  · Raise the tip of the tongue to touch upper lip, then behind upper teeth pushing against roof of mouth like you are scraping peanut butter off the roof of your mouth  · With mouth open, suck the tongue to the roof of the mouth  · Repeat "puh-tuh-kuh" or "buttercup" as quickly as possible  · Repeat "alondra- alondra- alondra" "la- la- la" "catalino lady, catalino lady"    Palate  · Fill cheeks with air  · Fill each cheek separately with air  · Fill cheeks with air and press finger against one cheek without allowing air to escape  · Sustain "s" as long as possible       Complete each exercise 20x each, 3x daily.    "

## 2020-06-10 NOTE — PLAN OF CARE
Outpatient Neurological Rehabilitation  Speech and Language Therapy Evaluation    Date: 6/10/2020     Name: Lorena Rodriguez   MRN: [id number]    Therapy Diagnosis:   Encounter Diagnosis   Name Primary?    Embolic stroke involving right cerebellar artery    Physician: Mira Jacome PA-C  Physician Orders: [id number] - Ambulatory referral/consult to Speech Therapy  Medical Diagnosis: I63.441 (ICD-10-CM) - Embolic stroke involving right cerebellar artery    Visit # / Visits Authorized:  1/1   Date of Evaluation:  6/10/2020   Insurance Authorization Period: 06/08/2020-06/08/2021  Plan of Care Certification: evaluation only     Time In: 1:15   Time Out: 2:00   Procedure Min.   Evaluation of Speech Sound Production  35   Swallow and Oral Function Evaluation   10     Precautions: Standard  Subjective   Date of Onset: 6/4/20  History of Current Condition:  Pt arrived to ST appointment alone, provided history independently. Patient brought medical history documents along with a detailed document highlighting his medical record that he created to bring to medical appointments to avoid any confusion regarding dates of illnesses/hospital admissions. Of note, pt with thick foreign accent as he speaks multiple languages. Pt reports that he had a TIA 8/1/2019 which resulted in double vision which that eventually spontaneously resolved. 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 Pt stated that he was unable to drive home so he called his wife to come get him. On 6/4, pt was doing lawnwork and his left leg starts to go weak, he then called for his wife because he was unable to walk to the front door. He made his way to the bench in front of his house, and his whole body went weak. He could hear everything but could not see well or speak and was very weak. Called ambulance and went to ER at [facility], only thing they could tell  "him that he had "probably" had another stroke. He was waiting to speak with doctors but the doctor never came and he did not wish to wait any longer so he left against medical advice.  Patient reports that he then went to [facility] for treatment where objective testing revealed location of previous TIA and new stroke. He reports that his memory/attention/thinking skills are at baseline levels. Reports that his speech was originally most affected but has been improving every day. Pt states that since he already has an accent, unfamiliar communication partners have always had difficulty understanding him but if he speaks with a slow rate of speech, intelligibility is improved. He reports that his wife, children, and grandchildren have no difficulty understanding him.   Past Medical History: Lorena Rodriguez  has a past medical history of Coronary artery disease, Diabetes mellitus, and Hypertension.  Lorena Rodriguez  has a past surgical history that includes Coronary stent placement; Hernia repair; Hemorrhoid surgery; Hand amputation through metacarpal; Shoulder surgery; Eye surgery; Arthroscopy of shoulder with decompression of subacromial space (Right, 8/20/2019); Arthroscopic repair of rotator cuff of shoulder (Right, 8/20/2019); and Chondroplasty of shoulder (Right, 8/20/2019).  Medical Hx and Allergies: Lorena has a current medication list which includes the following prescription(s): amlodipine, aspirin, atorvastatin, clopidogrel, co-enzyme q-10, levothyroxine, metformin, metoprolol succinate, nitroglycerin, olmesartan-hydrochlorothiazide, ondansetron, and oxycodone-acetaminophen. Review of patient's allergies indicates:  No Known Allergies  Prior Therapy:  No prior ST  Social History:  Pt lives with his wife. He and his wife have 2 daughters and 1 son and 6 grandchildren. Their children live next door to them. 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   Prior Level of Function: Pt with no difficulty communicating with familiar communication partners prior to onset of CVA; mild language barrier with unfamiliar communication partners which negatively affects the pt's speech intelligibility.   Current Level of Function: No difficulty communicating with familiar communication partners.   Pain: 0/10  Pain Location / Description: n/a  Nutrition:  Regular/thin   Patient's Therapy Goals: "Get better"   Objective   Formal Assessment:    Auditory Comprehension: No concerns reported or observed; WFL for the purpose of assessment and conversation.    Verbal Expression: No concerns reported or observed; WFL for the purpose of assessment and conversation.     Reading Comprehension: Did not assess. No concerns reported or observed.        Written Expression: Did not assess. No concerns reported or observed.        Cognition: No concerns reported or observed. Pt and alert and cooperative throughout evaluation, was oriented x 4 independently. Pt did not require cues to attend to evaluation tasks throughout session. Pt demonstrated adequate topic maintenance and reasoning skills throughout evaluation. Cognitive-linguistic skills WFL for the purpose of assessment and conversation.     Motor Speech/Fluency/Voice:  Pt's motor speech, fluency, and voice were informally assessed. OME performed within Cranial Nerve Assessment detailed below. Motor speech skills were assessed and were functional for daily communication with a variety of communication partners (i.e. Family, friends, medical personnel). Pt read sentences aloud (blind to clinician) with 100% speech intelligibility.   Respiration / Phonation:   # of reps/ 5s Norms/ # reps per second Maximum Phon. Time (ah) Words Per Minute:   P^ 35 5.0-7.1 7.0 Trial 1: 14     128 words per minute   T^ 30 4.8-7.1 6.0 Trial 2: 17 >125 = WFL    K^ 24 4.4-7.4 4.8   <125 = refer for AAC eval   P^T^K^ 15 3.6-7.5 3.0  " Avg: 15.5 Norm: 160-170  (paragraph reading)       Norm (F 10-26, M 13-34.6) Norm: 150 to 250 (norm conversation)        Phonation Oral Reading Conversation   Stimulus Sustained ah: [place] History and Conversation   Quality clear clear clear   Duration  15.5 seconds  1 minute  N/a   Loudness  WFL WFL WFL   Steadiness WFL WFL WFL     Swallowing:    Clinical Swallow Exam/ Oral Mechanism Exam:  Mandibular Strength and Mobility - Trigeminal Nerve (CNV) Rest: WFL   Lateralization: WFL  Protrusion: WFL  Retraction:  WFL  Involuntary Movement: absent   Oral Labial Strength and Mobility -  Facial Nerve (CN VII)  Rest: WFL   Protrusion: WFL  Retraction:  WFL  Involuntary Movement: absent    Lingual Strength and Mobility- Hypoglossal Nerve (CN XII)  Rest: WFL   Lateralization: WFL  Protrusion: WFL  Elevation:  WFL  Involuntary Movement: absent    Velar Elevation -   Glossopharyngeal Nerve (CN IX) and Vagus (CN X) Rest: WFL   Symmetry: WFL   Elevation: WFL   Sustained elevation: WFL   Involuntary movement: absent     Buccal Strength and Mobility -   Facial Nerve (CN VII)  WFL    Facial Sensation and Movement -   Facial Nerve (CN VII) Symmetrical at rest: yes  Wrinkle forehead: yes  Able to close eyes tightly: yes  Taste to Anterior 2/3 of Tongue: yes     Structure Abnormalities: no  Dentition: Pt wears upper and lower dentures   Secretion Management: Adequate   Mucosal Quality: Adequate   Volitional Cough: strong   Volitional Swallow: WFL  Voice Prior to PO Intake: clear     Sinclair Swallow Protocol:  PASSED  Sinclair Swallow Protocol dictates pt remain NPO if fail screener; (Charissar et al. 2014) however, as objective swallow assessment is not available for greater than a week, pt will remain on current diet until objective assessment is completed unless otherwise indicated.     Clinical Swallow Examination:   Pt presented with:   THIN:- 3 oz water test and self regulated thin liquid via cup    DESCRIPTION: Oral Phase:  Adequate labial seal maintained evidenced by no anterior bolus loss. Pharyngeal phase: Laryngeal lift present upon manual palpation. Bolus transit time from presentation of bolus to laryngeal lift appeared timely. No overt s/s aspiration appreciated. No coughing or throat clearing throughout or post PO trials. Pt's vocal quality remained clear following po trials. Please note silent aspiration cannot be ruled out in this setting.     Recommend MBSS: no    Hearing / Vision: No concerns reported or observed regarding pt's hearing acuity or visual acuity skills.      Treatment   Treatment Time In: n/a  Treatment Time Out: n/a  Total Treatment Time: n/a  no treatment performed 2/2 time to complete evaluation.    Education: motor speech strategies and course of medical disease affect on therapy diagnosis  were discussed with pt. Patient expressed understanding of all discussed.     Home Program: Pt provided with HEP containing oral motor exercises, speech agility drills.  Assessment   Lorena presents to [facility] s/p medical diagnosis of embolic stroke involving right cerebellar artery. Demonstrates impairments including limitations as described in the problem list. He presents with functional motor speech skills though pt with mildly decreased speech intelligibility with unfamiliar communication partners 2/2 foreign accent which is his baseline. He reports that he is able to communicate efficiently and effectively with his family/friends. Positive prognostic factors include pt motivation. Negative prognostic factors include none. No barriers to therapy identified. Skilled, outpatient neurological rehabilitation speech therapy not indicated at this time.   Rehab Potential: good  Pt's spiritual, cultural and educational needs considered and pt agreeable to plan of care and goals.    Plan     Plan of Care Certification Period: Evaluation only    Recommended Treatment Plan: Skilled speech therapy  services not warranted at this time.    Other Recommendations: none    Therapist's Name:   ROSSY Steve, CCC-SLP  Speech Language Pathologist   6/10/2020    Physician Name: _______________________________    Physician Signature: ____________________________

## 2020-06-10 NOTE — PROGRESS NOTES
Please see full speech therapy evaluation in POC section.       ROSSY Steve, CCC-SLP  Speech Language Pathologist   6/10/2020

## 2020-06-12 ENCOUNTER — CLINICAL SUPPORT (OUTPATIENT)
Dept: REHABILITATION | Facility: HOSPITAL | Age: 65
End: 2020-06-12
Payer: MEDICARE

## 2020-06-12 DIAGNOSIS — I63.441 EMBOLIC STROKE INVOLVING RIGHT CEREBELLAR ARTERY: ICD-10-CM

## 2020-06-12 PROCEDURE — 97165 OT EVAL LOW COMPLEX 30 MIN: CPT | Mod: PN

## 2020-06-12 NOTE — PLAN OF CARE
[facility] Occupational Therapy  Initial Neurological Evaluation and D/C Summary     Date: 6/12/2020  Patient: Lorena Rodriguez  Chart Number: [id number]    Therapy Diagnosis: Left Visual field cut  Physician: Mira Jacome PA-C    Physician Orders: OT Eval and treat  Medical Diagnosis: Embolic stroke involving right cerebellar artery  Evaluation Date: 6/12/2020  Plan of Care Expiration Period: n/a  Insurance Authorization period Expiration: 6/8/2021  Date of Return to MD: TBD  Visit # / Visits Authorized: 1 / 1    Time In:11:30am  Time Out: 12:15pm  Total Billable (one on one) Time: 45 minutes    Precautions: Standard    Subjective     History of Current Condition: First stroke May 25th, second stroke June 4th while cutting grass. Biggest issues is with vision. L visual field cut.     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    Past Surgical History:  Lorena Rodriguez  has a past surgical history that includes Coronary stent placement; Hernia repair; Hemorrhoid surgery;  Hand amputation through metacarpal; Shoulder surgery; Eye surgery; Arthroscopy of shoulder with decompression of subacromial space (Right, 2019); Arthroscopic repair of rotator cuff of shoulder (Right, 2019); and Chondroplasty of shoulder (Right, 2019).    Current Medications:  Lorena has a current medication list which includes the following prescription(s): amlodipine, aspirin, atorvastatin, clopidogrel, co-enzyme q-10, levothyroxine, metformin, metoprolol succinate, nitroglycerin, olmesartan-hydrochlorothiazide, ondansetron, and oxycodone-acetaminophen.    Allergies:  Review of patient's allergies indicates:  No Known Allergies     Other: n/a    Objective     Cognitive Exam:  Oriented: Person, Place, Time and Situation  Behaviors: normal, cooperative  Follows Commands/attention: Follows multistep  commands  Communication: clear/fluent  Memory: No Deficits noted as determined by 3 word recall after 1 minute and 3 minutes  Safety awareness/insight to disability: aware of diagnosis, treatment, and prognosis  Coping skills/emotional control: Appropriate to situation    Visual/Perceptual:  Tracking: intact  All planes  Saccades: intact all planes   Acuity: corrected by glasses  Convergence: WNL  Nystagmus: none seen   R/L discrimination: intact  Visual field: impaired  Motor Planning Praxis: intact  Comments: Despite visual field cut, L     Physical Exam:  Postural examination/scapula alignment: Rounded shoulders  Joint integrity: WNL  Skin integrity: WNL  Edema: none noted   Palpation: n/a    ROM and strength WFL bilaterally    Line bisection: WFL  Visual Scannin/34   [place] Making A and B: 44 sec for A and 2:56 for B (slowed for trail B secondary to error)         Strength: (KASSI Dynamometer in lbs.) Average 3 trials, Position II:     2020    Left Right   Rung II NT# NT#     Pinch Strength (Measured in psi)     2020    Left Right   Key Pinch NT psi NT psi   3pt  Pinch NT psi NT psi   2pt Pinch NT psi NT psi     Fine Motor Coordination: 9 Hole Peg Test  Left 6/12/2020 Right 6/12/2020   25.50 23.35     Gross motor coordination:   - ALEXI (Rapid Alternating Movements): WFL  - Finger to Nose (5 times): WFL  - Finger Flicks (coordination moving from digit flexion to digit extension): WFL    Tone:  Modified Tyler Scale:   0 - No increase in muscle tone    Comments: see assesment    Sensation:  Lorena  reports normal sensation       Balance:   Static Sit - NORMAL: No deviations seen in posture held statically  Dynamic sit- NORMAL: No deviations seen in posture held statically  Static Stand - NT  Dynamic stand - NT    Endurance Deficit: none                    Functional Status      Functional Mobility:  Bed mobility: I  Roll to left: I  Roll to right: I  Supine to sit: I  Sit to supine: I  Transfers to bed: I  Transfers to toilet: I  Car transfers: I  Wheelchair mobility: NA    ADL's:  Feeding: I  Grooming: I  Hygiene: I  UB Dressing: I  LB Dressing: I  Toileting: I  Bathing: I    IADL's:  Homecare: NA  Cooking: NA  Laundry: NA  Yard work: D  Use of telephone: I  Money management: I  Medication management: I  Handwriting:I  Technology Use:I    Comments:      CMS Impairment/Limitation/Restriction for FOTO Neuro Survey    Therapist reviewed FOTO scores for Lorena Rordiguez on 6/12/2020.   FOTO documents entered into [employer] (P) - see Media section.    Limitation Score: 25%  Category: Mobility         Treatment       Home Exercises and Patient Education Provided    Education provided:   -role of OT, goals for OT, scheduling/cancellations, insurance limitations with patient.  -Additional Education provided: Visual scanning games at home      Assessment     Lorena Rodriguez is a 65 y.o. male referred to outpatient occupational therapy and presents with a medical diagnosis of Embolic stroke involving right cerebellar artery , resulting in impaired visual function and demonstrates limitations as described in  the chart below.     Juliana is independent with all ADL/IADLs except for driving. Despite his visual field cut, he has already compensated for the loss. He is completing scanning tasks without errors. Trail making Part B took longer than normal but it I suspect it was due to the error he made that he had difficulty identifying. ROM and strength are all WFL. At this time, he has learned adaptive strategies on his own regarding the vision loss and he is not appropriate for follow up.     Plan of care discussed with patient: Yes  Pt's spiritual, cultural and educational needs considered and patient is agreeable to the plan of care and goals as stated below:     Anticipated Barriers for therapy: none    Medical Necessity is demonstrated by the following  Profile and History Assessment of Occupational Performance Level of Clinical Decision Making Complexity Score   Occupational Profile:   Lorena Rodriguez is a 65 y.o. male who lives with their family and is currently self-employed as . Lorena Rodriguez has difficulty with  Visual function  affecting his/her daily functional abilities. His/her main goal for therapy is normal eye sight.     Comorbidities:   See chart    Medical and Therapy History Review:   Brief               Performance Deficits    Physical:  Visual Functions    Cognitive:  No Deficits    Psychosocial:    No Deficits     Clinical Decision Making:  low    Assessment Process:  Problem-Focused Assessments    Modification/Need for Assistance:  Not Necessary    Intervention Selection:  Limited Treatment Options       low  Based on PMHX, co morbidities , data from assessments and functional level of assistance required with task and clinical presentation directly impacting function.       The following goals were discussed with the patient and patient is in agreement with them as to be addressed in the treatment plan.     Goals:  No goals needed. Pt. D/C    Plan   Pt. discharded from therapy at this time.      RAMON Suresh      I certify the need for these services furnished under this plan of treatment and while under my care.  ____________________________________ Physician/Referring Practitioner   Date of Signature

## 2020-06-18 ENCOUNTER — TELEPHONE (OUTPATIENT)
Dept: NEUROLOGY | Facility: CLINIC | Age: 65
End: 2020-06-18

## 2020-06-18 NOTE — TELEPHONE ENCOUNTER
----- Message from Belinda Arreguin NP sent at 6/18/2020  3:21 PM CDT -----  Regarding: stroke f/u cassidy Keller,    The patient attached has an appointment with Dr. Milian scheduled for 7/20, but he is also in need of stroke follow up. Is there a way to call patient and move to Thursday, so we can kill 2 birds with 1 stone?

## 2020-07-21 ENCOUNTER — HOME CARE VISIT (OUTPATIENT)
Dept: NEUROLOGY | Facility: HOSPITAL | Age: 65
End: 2020-07-21

## 2020-07-23 ENCOUNTER — OFFICE VISIT (OUTPATIENT)
Dept: NEUROLOGY | Facility: CLINIC | Age: 65
End: 2020-07-23
Payer: MEDICARE

## 2020-07-23 VITALS
HEART RATE: 65 BPM | BODY MASS INDEX: 39.68 KG/M2 | HEIGHT: 68 IN | DIASTOLIC BLOOD PRESSURE: 79 MMHG | SYSTOLIC BLOOD PRESSURE: 135 MMHG | WEIGHT: 261.81 LBS

## 2020-07-23 DIAGNOSIS — H53.462 HEMIANOPIA OF LEFT EYE: ICD-10-CM

## 2020-07-23 DIAGNOSIS — I10 ESSENTIAL (PRIMARY) HYPERTENSION: ICD-10-CM

## 2020-07-23 DIAGNOSIS — E78.5 HYPERLIPIDEMIA, UNSPECIFIED HYPERLIPIDEMIA TYPE: ICD-10-CM

## 2020-07-23 DIAGNOSIS — I63.541 CEREBRAL INFARCTION INVOLVING RIGHT CEREBELLAR ARTERY: ICD-10-CM

## 2020-07-23 DIAGNOSIS — Z86.73 HISTORY OF ISCHEMIC RIGHT PCA STROKE: Primary | ICD-10-CM

## 2020-07-23 PROCEDURE — 99214 PR OFFICE/OUTPT VISIT, EST, LEVL IV, 30-39 MIN: ICD-10-PCS | Mod: S$PBB,GC,, | Performed by: PSYCHIATRY & NEUROLOGY

## 2020-07-23 PROCEDURE — 99214 OFFICE O/P EST MOD 30 MIN: CPT | Mod: S$PBB,GC,, | Performed by: PSYCHIATRY & NEUROLOGY

## 2020-07-23 PROCEDURE — 99999 PR PBB SHADOW E&M-EST. PATIENT-LVL IV: ICD-10-PCS | Mod: PBBFAC,GC,, | Performed by: STUDENT IN AN ORGANIZED HEALTH CARE EDUCATION/TRAINING PROGRAM

## 2020-07-23 PROCEDURE — 99999 PR PBB SHADOW E&M-EST. PATIENT-LVL IV: CPT | Mod: PBBFAC,GC,, | Performed by: STUDENT IN AN ORGANIZED HEALTH CARE EDUCATION/TRAINING PROGRAM

## 2020-07-23 PROCEDURE — 99214 OFFICE O/P EST MOD 30 MIN: CPT | Mod: PBBFAC | Performed by: STUDENT IN AN ORGANIZED HEALTH CARE EDUCATION/TRAINING PROGRAM

## 2020-07-23 RX ORDER — ASPIRIN 325 MG
325 TABLET ORAL
COMMUNITY
End: 2023-10-19

## 2020-07-25 NOTE — PROGRESS NOTES
Initial stroke mobile encounter completed via telephone audio only with patient and wife primary caregiver. SM nurse discussed and educated on medication compliance, residual stroke effects present, purpose of SM program, and continued follow up needed with clinic providers.

## 2020-07-26 NOTE — PROGRESS NOTES
Neurology Clinic  Follow-up Visit    Patient Name: Lorena Rodriguez  MRN: [id number]    CC: Stroke follow up    HPI: Lorena Rodriguez is a 65-year-old male with past medical history significant for L midbrain stroke 8/2019, CAD, type 2 diabetes, hypertension who presents to clinic for scheduled follow up following an acute right cerebellar infarct. Patient states that he developed a sudden left sided vision loss on May 26, 2020 for which he presented to his ophthalmologist who recommended that the patient go immediately to the emergency room for further evaluation and stroke workup.  Patient presented to [facility] on 05/29 where a CT showed a subacute parasagittal occipital right infarct for which he was admitted.  Patient states that his symptoms at that time were a left lateral hemianopia, left facial droop, dysarthria, and transient right lower extremity weakness.  Patient states that he did not have an MRI and left AMA after not seeing a neurologist for several days.  Patient presented to [facility] on 06/07 for further evaluation where an MRI showed a moderate-sized signal abnormality right superior cerebellum most compatible with recent infarction as well as signal abnormality right parasagittal occipital lobe with susceptibility and T1 shortening along the cortex and underlying vasogenic edema concerning for subacute age cortical hemorrhage and vasogenic edema.    Patient was admitted to Vascular Neurology where a full stroke work up was completed. MRA Head and Neck did not show clinically significant stenosis and an echo was normal. Patient was discharged on 6/9 on ASA, Plavix and Atorvastatin. Patient states that he continues to have a left hemianopsia and slight dysarthria which is confirmed by his wife. Patient is seeing cardiology at [facility] where his evaluation is ongoing .    Review of Systems   Constitutional: Negative for malaise/fatigue and weight loss.   HENT: Negative for hearing loss.    Eyes: Positive for blurred  vision. Negative for photophobia.   Respiratory: Negative for shortness of breath.    Cardiovascular: Negative for chest pain.   Gastrointestinal: Negative for nausea and vomiting.   Musculoskeletal: Negative for back pain and neck pain.   Neurological: Positive for speech change. Negative for dizziness, tingling, tremors, sensory change, focal weakness, seizures, loss of consciousness, weakness and headaches.   Psychiatric/Behavioral: Negative for hallucinations and memory loss. The patient does not have insomnia.        Past Medical History  Past Medical History:   Diagnosis Date    Coronary artery disease     Diabetes mellitus     pre-diabetes    Hypertension        Medications    Current Outpatient Medications:     amLODIPine (NORVASC) 10 MG tablet, Take 10 mg by mouth once daily., Disp: , Rfl:     aspirin 325 MG tablet, Take 325 mg by mouth., Disp: , Rfl:     atorvastatin (LIPITOR) 40 MG tablet, Take 1 tablet (40 mg total) by mouth once daily., Disp: 90 tablet, Rfl: 3    clopidogreL (PLAVIX) 75 mg tablet, Take 75 mg by mouth once daily., Disp: , Rfl:     levothyroxine (SYNTHROID) 200 MCG tablet, Take 1 tablet (200 mcg total) by mouth before breakfast., Disp: 30 tablet, Rfl: 11    metFORMIN (GLUCOPHAGE) 500 MG tablet, Take 500 mg by mouth 2 (two) times daily with meals., Disp: , Rfl:     metoprolol succinate (TOPROL-XL) 50 MG 24 hr tablet, Take 50 mg by mouth once daily., Disp: , Rfl:     NITROGLYCERIN SL, Place 0.4 mg under the tongue daily as needed., Disp: , Rfl:     olmesartan-hydrochlorothiazide (BENICAR HCT) 20-12.5 mg per tablet, Take 1 tablet by mouth once daily., Disp: , Rfl:     aspirin (ECOTRIN) 81 MG EC tablet, Take 1 tablet (81 mg total) by mouth once daily. (Patient taking differently: Take by mouth once daily. ), Disp: , Rfl: 0    co-enzyme Q-10 30 mg capsule, Take 30 mg by mouth once daily., Disp: , Rfl:     ondansetron (ZOFRAN-ODT) 4 MG TbDL, Take 2 tablets (8 mg total) by mouth  "every 8 (eight) hours as needed. (Patient not taking: Reported on 6/9/2020), Disp: 10 tablet, Rfl: 1    oxyCODONE-acetaminophen (PERCOCET)  mg per tablet, Take 1 tablet by mouth every 4 (four) hours as needed. (Patient not taking: Reported on 6/9/2020), Disp: 35 tablet, Rfl: 0  Any other notable medications as documented in HPI    Allergies  Review of patient's allergies indicates:  No Known Allergies    Social History  Social History     Socioeconomic History    Marital status:      Spouse name: Not on file    Number of children: Not on file    Years of education: Not on file    Highest education level: Not on file   Occupational History    Not on file   Social Needs    Financial resource strain: Not on file    Food insecurity     Worry: Not on file     Inability: Not on file    Transportation needs     Medical: Not on file     Non-medical: Not on file   Tobacco Use    Smoking status: Former Smoker   Substance and Sexual Activity    Alcohol use: Never     Frequency: Never    Drug use: Not on file    Sexual activity: Not on file   Lifestyle    Physical activity     Days per week: Not on file     Minutes per session: Not on file    Stress: Not on file   Relationships    Social connections     Talks on phone: Not on file     Gets together: Not on file     Attends Hinduism service: Not on file     Active member of club or organization: Not on file     Attends meetings of clubs or organizations: Not on file     Relationship status: Not on file   Other Topics Concern    Not on file   Social History Narrative    Not on file     Any other notable Social History as documented in HPI.    Family History  No family history on file.  Any other notable FMH as documented in HPI.    Physical Exam  /79   Pulse 65   Ht 5' 8" (1.727 m)   Wt 118.8 kg (261 lb 12.7 oz)   BMI 39.81 kg/m²     Physical Exam  Vitals signs reviewed.   Constitutional:       Appearance: Normal appearance.   HENT:      " Head: Normocephalic and atraumatic.      Nose: Nose normal.   Eyes:      Extraocular Movements: Extraocular movements intact.      Pupils: Pupils are equal, round, and reactive to light.      Comments: L lateral heminopsia   Neck:      Musculoskeletal: Normal range of motion.   Pulmonary:      Effort: Pulmonary effort is normal. No respiratory distress.   Skin:     General: Skin is warm and dry.   Neurological:      Mental Status: He is alert and oriented to person, place, and time.      Sensory: No sensory deficit.      Motor: No weakness.      Coordination: Coordination normal.      Gait: Gait normal.      Deep Tendon Reflexes: Reflexes normal.         Neurologic Exam: The patient is awake, alert and oriented. Language is fluent.  Fund of knowledge is appropriate.     Cranial nerves:   Pupils are round and reactive to light and accommodation.   Visual field testing shows a left hemianopsia  Ocular motility is full in all cardinal positions of gaze.   Facial sensation is normal to light touch.   Facial activation is symmetric.   Hearing is symmetric bilaterally.   Palate elevates symmetrically.   Shoulder elevation is symmetric and full strength bilaterally.   Tongue is midline and neck rotation strength is normal bilaterally.     Motor examination of all extremities demonstrates normal bulk and tone in all four limbs. There are no atrophy or fasciculations. Strength is 5/5 in the upper and lower extremities bilaterally.    Sensory examination   Sensation to light touch: intact in BUE and BLE  Sensation to temperature: intact in BUE and BLE  Sensation to vibration: intact in BUE and BLE  Sensation to pinprick: intact in BUE and BLE  Proprioception: intact in BUE and BLE    Deep tendon reflexes are 2+ and symmetric in the upper and lower extremities bilaterally.  No clonus, downgoing toes b/l.    Gait: Normal tandem, and casual gait.    Coordination: Finger to nose and heel to shin testing is normal in both upper  and lower extremities.    Lab and Test Results  All prior available lab work has been personally reviewed and interpreted by myself.  Relevant studies as listed below:    Echocardiogram wnl    Images:  All prior available relevant imaging has been personally reviewed and interpreted by myself.  Relevant imaging and radiology reports are summarized as below.    MRI Brain WO 6/6/2020  Moderate-sized signal abnormality right superior cerebellum most compatible with recent infarction.  No significant mass effect or hydrocephalus.     Moderate signal abnormality right parasagittal occipital lobe with susceptibility and T1 shortening along the cortex and underlying vasogenic edema concerning for subacute age cortical hemorrhage and vasogenic edema, however underlying hemorrhagic mass cannot be excluded.  Clinical correlation and follow-up advised.  This could be further characterized with postcontrast imaging as well as CT.  Comparison with prior imaging may be of further diagnostic value.     Moderate patchy opacities in the paranasal sinuses.    MRA Brain and Neck WO 6/6/2020  MRA head: Unremarkable MRA of the head specifically without evidence for focal stenosis or proximal occlusion.     MRA neck: Irregularity of the carotid bifurcation and proximal ICAs although distorted by motion concerning for atherosclerotic disease with overall less than 50% proximal ICA stenosis by NASCET criteria.      Assessment and Plan    Problem List Items Addressed This Visit        Neuro    Cerebral infarction involving right cerebellar artery    Current Assessment & Plan     Presented 6/7 after prior admission to OSH  MRI with evidence of infarct in right superior cerebellum and right parasagittal occipital lobe  Exam with Left heminopia  Dysarthria improving   ASA/Plavix and Statin  Following with PCP and Cardiology at  for further evaluation   PT/OT         H/O ischemic right PCA stroke - Primary    Current Assessment & Plan     See  Cerebral infarction involving right cerebellar artery            Ophtho    Hemianopia of left eye    Current Assessment & Plan     Due to infarct  Unchanged since admission per patient  No longer driving  Follows with [facility]o [facility]            Cardiac/Vascular    Essential (primary) hypertension    Current Assessment & Plan     Management per PCP         Hyperlipemia    Current Assessment & Plan     Management per PCP                 Nj Milian MD  Neurology Resident - PGY3  [facility]  [address] [place] [address]

## 2020-07-26 NOTE — ASSESSMENT & PLAN NOTE
Due to infarct  Unchanged since admission per patient  No longer driving  Follows with Optho at [facility]

## 2020-07-26 NOTE — ASSESSMENT & PLAN NOTE
Presented 6/7 after prior admission to OSH  MRI with evidence of infarct in right superior cerebellum and right parasagittal occipital lobe  Exam with Left heminopia  Dysarthria improving   ASA/Plavix and Statin  Following with PCP and Cardiology at  for further evaluation   PT/OT

## 2020-07-30 ENCOUNTER — HOSPITAL ENCOUNTER (EMERGENCY)
Facility: HOSPITAL | Age: 65
Discharge: HOME | End: 2020-07-31
Attending: EMERGENCY MEDICINE
Payer: MEDICARE

## 2020-07-30 DIAGNOSIS — G45.9 TIA (TRANSIENT ISCHEMIC ATTACK): Primary | ICD-10-CM

## 2020-07-30 LAB
ALBUMIN SERPL BCP-MCNC: 3.9 G/DL (ref 3.5–5.2)
ALP SERPL-CCNC: 65 U/L (ref 55–135)
ALT SERPL W/O P-5'-P-CCNC: 30 U/L (ref 10–44)
ANION GAP SERPL CALC-SCNC: 9 MMOL/L (ref 8–16)
AST SERPL-CCNC: 17 U/L (ref 10–40)
BASOPHILS # BLD AUTO: 0.06 K/UL (ref 0–0.2)
BASOPHILS NFR BLD: 0.5 % (ref 0–1.9)
BILIRUB SERPL-MCNC: 0.3 MG/DL (ref 0.1–1)
BUN SERPL-MCNC: 19 MG/DL (ref 8–23)
CALCIUM SERPL-MCNC: 10 MG/DL (ref 8.7–10.5)
CHLORIDE SERPL-SCNC: 100 MMOL/L (ref 95–110)
CHOLEST SERPL-MCNC: 133 MG/DL (ref 120–199)
CHOLEST/HDLC SERPL: 3 {RATIO} (ref 2–5)
CO2 SERPL-SCNC: 29 MMOL/L (ref 23–29)
CREAT SERPL-MCNC: 1 MG/DL (ref 0.5–1.4)
DIFFERENTIAL METHOD: ABNORMAL
EOSINOPHIL # BLD AUTO: 0.4 K/UL (ref 0–0.5)
EOSINOPHIL NFR BLD: 3 % (ref 0–8)
ERYTHROCYTE [DISTWIDTH] IN BLOOD BY AUTOMATED COUNT: 14.8 % (ref 11.5–14.5)
EST. GFR  (AFRICAN AMERICAN): >60 ML/MIN/1.73 M^2
EST. GFR  (NON AFRICAN AMERICAN): >60 ML/MIN/1.73 M^2
GLUCOSE SERPL-MCNC: 153 MG/DL (ref 70–110)
HCT VFR BLD AUTO: 43.9 % (ref 40–54)
HDLC SERPL-MCNC: 44 MG/DL (ref 40–75)
HDLC SERPL: 33.1 % (ref 20–50)
HGB BLD-MCNC: 13.7 G/DL (ref 14–18)
IMM GRANULOCYTES # BLD AUTO: 0.06 K/UL (ref 0–0.04)
IMM GRANULOCYTES NFR BLD AUTO: 0.5 % (ref 0–0.5)
INR PPP: 0.9 (ref 0.8–1.2)
LDLC SERPL CALC-MCNC: 59.8 MG/DL (ref 63–159)
LYMPHOCYTES # BLD AUTO: 2.6 K/UL (ref 1–4.8)
LYMPHOCYTES NFR BLD: 20.7 % (ref 18–48)
MAGNESIUM SERPL-MCNC: 1.9 MG/DL (ref 1.6–2.6)
MCH RBC QN AUTO: 25.6 PG (ref 27–31)
MCHC RBC AUTO-ENTMCNC: 31.2 G/DL (ref 32–36)
MCV RBC AUTO: 82 FL (ref 82–98)
MONOCYTES # BLD AUTO: 1.8 K/UL (ref 0.3–1)
MONOCYTES NFR BLD: 14.2 % (ref 4–15)
NEUTROPHILS # BLD AUTO: 7.7 K/UL (ref 1.8–7.7)
NEUTROPHILS NFR BLD: 61.1 % (ref 38–73)
NONHDLC SERPL-MCNC: 89 MG/DL
NRBC BLD-RTO: 0 /100 WBC
PLATELET # BLD AUTO: 267 K/UL (ref 150–350)
PMV BLD AUTO: 9.2 FL (ref 9.2–12.9)
POCT GLUCOSE: 163 MG/DL (ref 70–110)
POTASSIUM SERPL-SCNC: 4.4 MMOL/L (ref 3.5–5.1)
PROT SERPL-MCNC: 7.7 G/DL (ref 6–8.4)
PROTHROMBIN TIME: 10.4 SEC (ref 9–12.5)
RBC # BLD AUTO: 5.35 M/UL (ref 4.6–6.2)
SODIUM SERPL-SCNC: 138 MMOL/L (ref 136–145)
TRIGL SERPL-MCNC: 146 MG/DL (ref 30–150)
WBC # BLD AUTO: 12.52 K/UL (ref 3.9–12.7)

## 2020-07-30 PROCEDURE — 93010 ELECTROCARDIOGRAM REPORT: CPT | Mod: ,,, | Performed by: INTERNAL MEDICINE

## 2020-07-30 PROCEDURE — 99285 EMERGENCY DEPT VISIT HI MDM: CPT | Mod: 25

## 2020-07-30 PROCEDURE — 85025 COMPLETE CBC W/AUTO DIFF WBC: CPT

## 2020-07-30 PROCEDURE — 93005 ELECTROCARDIOGRAM TRACING: CPT

## 2020-07-30 PROCEDURE — 93010 EKG 12-LEAD: ICD-10-PCS | Mod: ,,, | Performed by: INTERNAL MEDICINE

## 2020-07-30 PROCEDURE — 85610 PROTHROMBIN TIME: CPT

## 2020-07-30 PROCEDURE — 83735 ASSAY OF MAGNESIUM: CPT

## 2020-07-30 PROCEDURE — 80061 LIPID PANEL: CPT

## 2020-07-30 PROCEDURE — 84439 ASSAY OF FREE THYROXINE: CPT

## 2020-07-30 PROCEDURE — 82962 GLUCOSE BLOOD TEST: CPT

## 2020-07-30 PROCEDURE — 80053 COMPREHEN METABOLIC PANEL: CPT

## 2020-07-30 PROCEDURE — 84443 ASSAY THYROID STIM HORMONE: CPT

## 2020-07-31 ENCOUNTER — TELEPHONE (OUTPATIENT)
Dept: NEUROLOGY | Facility: CLINIC | Age: 65
End: 2020-07-31

## 2020-07-31 VITALS
RESPIRATION RATE: 18 BRPM | HEIGHT: 68 IN | HEART RATE: 70 BPM | BODY MASS INDEX: 39.4 KG/M2 | SYSTOLIC BLOOD PRESSURE: 163 MMHG | WEIGHT: 260 LBS | OXYGEN SATURATION: 96 % | DIASTOLIC BLOOD PRESSURE: 77 MMHG | TEMPERATURE: 98 F

## 2020-07-31 LAB
T4 FREE SERPL-MCNC: 1.52 NG/DL (ref 0.71–1.51)
TSH SERPL DL<=0.005 MIU/L-ACNC: 0.04 UIU/ML (ref 0.4–4)

## 2020-07-31 NOTE — TELEPHONE ENCOUNTER
Called and spoke with pt's wife. She stated he was seen in [facility] ED. Pt went home did not want to be admitted for follow up imaging. But was told he should have MRI and ct angiogram. Explained to wife do not see that in the documentation from ED. I scheduled an appt for 08/31/20. I explained will send message to  to see if he would want any imaging done prior to appt. Wife verbalized understanding.

## 2020-07-31 NOTE — TELEPHONE ENCOUNTER
----- Message from Laurie Marquez sent at 7/31/2020  8:43 AM CDT -----  Contact: Jocelin pt's  [phone]  Pt 's wife called in states pt was in hospital yesterday and needs hospital follow up for stroke.

## 2020-07-31 NOTE — ED TRIAGE NOTES
"Pt reports "Dizziness, blurry vision and disorientation that began around 2150".  Reports hx of multiple CVAs and has right sided weakness, left eye droop and slurred speech from previous CVAs.  is equal and strong, no facial droop, no extremity drift and sensation intact.  "

## 2020-07-31 NOTE — ED PROVIDER NOTES
Encounter Date: 7/30/2020    SCRIBE #1 NOTE: I, Adriane Suhas, am scribing for, and in the presence of,  Jhon Hutton MD. I have scribed the entire note.       History     Chief Complaint   Patient presents with    Cerebrovascular Accident     Pt c/o sudden onset of dizziness, left sided upper facial droop, blurry vision and disorientation that began around 2150. Pt has a hx of multiple CVAs and has right sided weakness, left eye droop and slurred speech from previous CVAs.  is equal and strong, no extremity drift and sensation intact.Pt reports symptoms are slowly improving.     This is a 66 y/o male with a PMHx of multiple CVA's, Coronary artery disease, Diabetes mellitus, and Hypertension. He presents to the ED with a CC of a possible CVA. Patient felt sudden dizziness, blurred vision, and disorientation 90 minutes ago. Patient has chronic right sided weakness, left eye visual field deficit, and slurred speech from previous CVA's. The patient reports his eye site became so blurred that he couldn't walk without using extreme caution.  States he lost the vision in the medial aspect of his right visual field tonight which was new.  The symptoms are improving..  His symptoms began at 2150, but they have been resolving since he's been in the ED within 1 hour. He denies any nausea, vomiting, tingling, numbness, chest pain, palpitations, shortness of breath, or neck pain. The patient is currently taking Plavix and Asprin, and has taken his medications today. NKDA    The history is provided by the patient.     Review of patient's allergies indicates:  No Known Allergies  Past Medical History:   Diagnosis Date    Coronary artery disease     Diabetes mellitus     pre-diabetes    Hypertension      Past Surgical History:   Procedure Laterality Date    ARTHROSCOPIC REPAIR OF ROTATOR CUFF OF SHOULDER Right 8/20/2019    Procedure: REPAIR, ROTATOR CUFF, ARTHROSCOPIC;  Surgeon: Nj You MD;  Location:  [facility] OR;  Service: Orthopedics;  Laterality: Right;    ARTHROSCOPY OF SHOULDER WITH DECOMPRESSION OF SUBACROMIAL SPACE Right 8/20/2019    Procedure: ARTHROSCOPY, SHOULDER, WITH SUBACROMIAL SPACE DECOMPRESSION;  Surgeon: Nj You MD;  Location: [facility] OR;  Service: Orthopedics;  Laterality: Right;  WITH BLOCK    CHONDROPLASTY OF SHOULDER Right 8/20/2019    Procedure: CHONDROPLASTY, SHOULDER;  Surgeon: Nj You MD;  Location: [facility] OR;  Service: Orthopedics;  Laterality: Right;    CORONARY STENT PLACEMENT      EYE SURGERY      HAND AMPUTATION THROUGH METACARPAL      right    HEMORRHOID SURGERY      HERNIA REPAIR      SHOULDER SURGERY      right     History reviewed. No pertinent family history.  Social History     Tobacco Use    Smoking status: Former Smoker     Types: Cigarettes, Cigars    Smokeless tobacco: Former User   Substance Use Topics    Alcohol use: Never     Frequency: Never    Drug use: Not on file     Review of Systems   Constitutional: Negative for chills, diaphoresis, fatigue and fever.   HENT: Negative for congestion, sinus pain and sore throat.    Eyes: Positive for visual disturbance. Negative for photophobia and pain.   Respiratory: Negative for cough and shortness of breath.    Cardiovascular: Negative for chest pain and palpitations.   Gastrointestinal: Negative for abdominal pain, diarrhea, nausea and vomiting.   Genitourinary: Negative for dysuria, flank pain and frequency.   Musculoskeletal: Negative for back pain and joint swelling.   Neurological: Positive for dizziness, facial asymmetry and speech difficulty. Negative for tremors, syncope, weakness, light-headedness, numbness and headaches.   Psychiatric/Behavioral: Negative for confusion.       Physical Exam     Initial Vitals   BP Pulse Resp Temp SpO2   07/30/20 2258 07/30/20 2332 07/30/20 2258 07/30/20 2258 07/30/20 2258   (!) 162/77 73 18 97.6 °F (36.4 °C) 98 %      MAP       --                Physical  Exam    Nursing note and vitals reviewed.  Constitutional: He appears well-developed and well-nourished. He is not diaphoretic. No distress.   HENT:   Head: Normocephalic and atraumatic.   Right Ear: External ear normal.   Left Ear: External ear normal.   Nose: Nose normal.   Mouth/Throat: Oropharynx is clear and moist.   Eyes: Conjunctivae and EOM are normal. Pupils are equal, round, and reactive to light. Right eye exhibits no discharge. Left eye exhibits no discharge. No scleral icterus.   Neck: Normal range of motion. Neck supple. No tracheal deviation present.   Cardiovascular: Normal rate, regular rhythm and normal heart sounds.   No murmur heard.  Pulmonary/Chest: Breath sounds normal. No stridor. No respiratory distress. He has no wheezes. He has no rhonchi. He has no rales.   Abdominal: Soft. Bowel sounds are normal. He exhibits no distension. There is no abdominal tenderness. There is no rebound and no guarding.   Musculoskeletal: Normal range of motion. No tenderness or edema.   Neurological: He is alert and oriented to person, place, and time. He has normal strength. No cranial nerve deficit or sensory deficit. GCS score is 15. GCS eye subscore is 4. GCS verbal subscore is 5. GCS motor subscore is 6.   Left eye lateral hemianopsia.  Remainder of visual field is normal.   Skin: Skin is warm and dry. No rash noted.   Psychiatric: He has a normal mood and affect. His behavior is normal. Judgment and thought content normal.         ED Course   Procedures  Labs Reviewed   CBC W/ AUTO DIFFERENTIAL - Abnormal; Notable for the following components:       Result Value    Hemoglobin 13.7 (*)     Mean Corpuscular Hemoglobin 25.6 (*)     Mean Corpuscular Hemoglobin Conc 31.2 (*)     RDW 14.8 (*)     Immature Grans (Abs) 0.06 (*)     Mono # 1.8 (*)     All other components within normal limits   COMPREHENSIVE METABOLIC PANEL - Abnormal; Notable for the following components:    Glucose 153 (*)     All other components  within normal limits   TSH - Abnormal; Notable for the following components:    TSH 0.037 (*)     All other components within normal limits   LIPID PANEL - Abnormal; Notable for the following components:    LDL Cholesterol 59.8 (*)     All other components within normal limits   T4, FREE - Abnormal; Notable for the following components:    Free T4 1.52 (*)     All other components within normal limits   POCT GLUCOSE - Abnormal; Notable for the following components:    POCT Glucose 163 (*)     All other components within normal limits   PROTIME-INR   MAGNESIUM     EKG Readings: (Independently Interpreted)   Initial Reading: No STEMI. Rhythm: Normal Sinus Rhythm. Heart Rate: 72. Ectopy: No Ectopy. Conduction: Normal. ST Segments: Normal ST Segments. T Waves: Normal. Clinical Impression: Normal Sinus Rhythm       Imaging Results          CT Head Without Contrast (Final result)  Result time 07/30/20 23:19:27    Final result by Derek Marroquin MD (07/30/20 23:19:27)                 Impression:      No acute intracranial abnormalities identified.  Previous infarcts with encephalomalacia involving the right cerebellar hemisphere and parasagittal right occipital lobe.      Electronically signed by: Derek Marroquin MD  Date:    07/30/2020  Time:    23:19             Narrative:    EXAMINATION:  CT HEAD WITHOUT CONTRAST    CLINICAL HISTORY:  Neuro deficit, acute, stroke suspected;    TECHNIQUE:  Low dose axial images were obtained through the head.  Coronal and sagittal reformations were also performed. Contrast was not administered.    COMPARISON:  MRI brain from 06/06/2020.    FINDINGS:  Encephalomalacia is seen within the right cerebellar hemisphere and parasagittal right occipital lobe consistent with areas of infarction seen on previous MRI from 06/06/2020.  No evidence of acute/recent major vascular distribution cerebral infarction, intraparenchymal hemorrhage, or intra-axial space occupying lesion. The ventricular  system is normal in size and configuration with no evidence of hydrocephalus. No effacement of the skull-base cisterns.  Empty sella configuration is noted.  No abnormal extra-axial fluid collections or blood products.  There is bilateral maxillary sinus disease with near complete opacification of the right maxillary antra.  Additional patchy ethmoid sinus disease and right sphenoid sinus disease are seen.  Mastoid air cells are clear.  The calvarium shows no significant abnormality.                                 Medical Decision Making:   Initial Assessment:   This is a 66 y/o male with a PMHx of multiple CVA's, Coronary artery disease, Diabetes mellitus, and Hypertension. He presents to the ED with a CC of a acute visual field loss that resolved. Labs, EKG, and Head CT will be ordered.  Suspect TIA.  No new deficits on neurologic exam.  Differential Diagnosis:   TIA, electrolyte abnormality, hypoglycemia, hyperglycemia, CVA  Independently Interpreted Test(s):   I have ordered and independently interpreted X-rays - see prior notes.  I have ordered and independently interpreted EKG Reading(s) - see prior notes  Clinical Tests:   Lab Tests: Ordered and Reviewed  Radiological Study: Ordered and Reviewed  Medical Tests: Ordered and Reviewed  ED Management:  0045:  Patient remains asymptomatic.  Patient feels back to baseline.  CT negative for acute findings.  Lab works unremarkable.  Blood pressure mildly elevated.  Discussed with vascular Neurology.  Recommended admission for observation with repeat MRI and CT of the head neck in the morning.  Advised patient of the recommendations.  Patient would rather not be admitted since he feels better.  Patient was to follow up with primary care as an outpatient for further testing.  Discussed risk and benefits admission versus discharge with patient including CVA with worsening disability and death.  Patient understands the risk and still wants to be discharged.  Patient  states he will return if he has worse symptoms tomorrow.            Scribe Attestation:   Scribe #3: I performed the above scribed service and the documentation accurately describes the services I performed. I attest to the accuracy of the note.                          Clinical Impression:       ICD-10-CM ICD-9-CM   1. TIA (transient ischemic attack)  G45.9 435.9         Disposition:   Disposition: AMA  Condition: Stable     ED Disposition Condition    AMA        I, Jhon Hutton MD, personally performed the services described in this documentation. All medical record entries made by the scribe were at my direction and in my presence.  I have reviewed the chart and agree that the record reflects my personal performance and is accurate and complete.                      Jhon Hutton MD  07/31/20 0049

## 2020-07-31 NOTE — ED NOTES
Pt transported from CT to Room 6 via wheelchair accompanied by ANNIE Story and MADELYN Galeana Tech.

## 2020-08-03 ENCOUNTER — HOSPITAL ENCOUNTER (OUTPATIENT)
Facility: HOSPITAL | Age: 65
LOS: 1 days | Discharge: HOME | End: 2020-08-04
Attending: EMERGENCY MEDICINE | Admitting: EMERGENCY MEDICINE
Payer: MEDICARE

## 2020-08-03 DIAGNOSIS — G45.9 TIA (TRANSIENT ISCHEMIC ATTACK): ICD-10-CM

## 2020-08-03 DIAGNOSIS — I63.9 STROKE: ICD-10-CM

## 2020-08-03 DIAGNOSIS — I63.542 CEREBRAL INFARCTION INVOLVING LEFT CEREBELLAR ARTERY: Primary | ICD-10-CM

## 2020-08-03 LAB
ALBUMIN SERPL BCP-MCNC: 3.8 G/DL (ref 3.5–5.2)
ALP SERPL-CCNC: 66 U/L (ref 55–135)
ALT SERPL W/O P-5'-P-CCNC: 33 U/L (ref 10–44)
ANION GAP SERPL CALC-SCNC: 7 MMOL/L (ref 8–16)
APTT BLDCRRT: 27.9 SEC (ref 21–32)
ASCENDING AORTA: 4.12 CM
AST SERPL-CCNC: 22 U/L (ref 10–40)
AV INDEX (PROSTH): 0.92
AV MEAN GRADIENT: 3 MMHG
AV PEAK GRADIENT: 5 MMHG
AV VALVE AREA: 4.57 CM2
AV VELOCITY RATIO: 0.92
BASOPHILS # BLD AUTO: 0.05 K/UL (ref 0–0.2)
BASOPHILS NFR BLD: 0.5 % (ref 0–1.9)
BILIRUB SERPL-MCNC: 0.4 MG/DL (ref 0.1–1)
BILIRUB UR QL STRIP: NEGATIVE
BSA FOR ECHO PROCEDURE: 2.38 M2
BUN SERPL-MCNC: 23 MG/DL (ref 8–23)
CALCIUM SERPL-MCNC: 9.2 MG/DL (ref 8.7–10.5)
CHLORIDE SERPL-SCNC: 100 MMOL/L (ref 95–110)
CLARITY UR: CLEAR
CO2 SERPL-SCNC: 28 MMOL/L (ref 23–29)
COLOR UR: NORMAL
CREAT SERPL-MCNC: 0.9 MG/DL (ref 0.5–1.4)
CV ECHO LV RWT: 0.56 CM
DIFFERENTIAL METHOD: ABNORMAL
DOP CALC AO PEAK VEL: 1.14 M/S
DOP CALC AO VTI: 25.97 CM
DOP CALC LVOT AREA: 4.9 CM2
DOP CALC LVOT DIAMETER: 2.51 CM
DOP CALC LVOT PEAK VEL: 1.05 M/S
DOP CALC LVOT STROKE VOLUME: 118.59 CM3
DOP CALCLVOT PEAK VEL VTI: 23.98 CM
E WAVE DECELERATION TIME: 287.73 MSEC
E/A RATIO: 0.79
ECHO LV POSTERIOR WALL: 1.32 CM (ref 0.6–1.1)
EOSINOPHIL # BLD AUTO: 0.3 K/UL (ref 0–0.5)
EOSINOPHIL NFR BLD: 3.3 % (ref 0–8)
ERYTHROCYTE [DISTWIDTH] IN BLOOD BY AUTOMATED COUNT: 14.6 % (ref 11.5–14.5)
EST. GFR  (AFRICAN AMERICAN): >60 ML/MIN/1.73 M^2
EST. GFR  (NON AFRICAN AMERICAN): >60 ML/MIN/1.73 M^2
FRACTIONAL SHORTENING: 41 % (ref 28–44)
GLUCOSE SERPL-MCNC: 123 MG/DL (ref 70–110)
GLUCOSE SERPL-MCNC: 92 MG/DL (ref 70–110)
GLUCOSE UR QL STRIP: NEGATIVE
HCT VFR BLD AUTO: 45.7 % (ref 40–54)
HGB BLD-MCNC: 14.3 G/DL (ref 14–18)
HGB UR QL STRIP: NEGATIVE
IMM GRANULOCYTES # BLD AUTO: 0.05 K/UL (ref 0–0.04)
IMM GRANULOCYTES NFR BLD AUTO: 0.5 % (ref 0–0.5)
INR PPP: 0.9 (ref 0.8–1.2)
INTERVENTRICULAR SEPTUM: 1.45 CM (ref 0.6–1.1)
IVRT: 106.11 MSEC
KETONES UR QL STRIP: NEGATIVE
LA MAJOR: 5.67 CM
LA MINOR: 5.36 CM
LA WIDTH: 4 CM
LEFT ATRIUM SIZE: 5.29 CM
LEFT ATRIUM VOLUME INDEX: 43.4 ML/M2
LEFT ATRIUM VOLUME: 99.11 CM3
LEFT INTERNAL DIMENSION IN SYSTOLE: 2.79 CM (ref 2.1–4)
LEFT VENTRICLE DIASTOLIC VOLUME INDEX: 45.13 ML/M2
LEFT VENTRICLE DIASTOLIC VOLUME: 103.13 ML
LEFT VENTRICLE MASS INDEX: 115 G/M2
LEFT VENTRICLE SYSTOLIC VOLUME INDEX: 12.8 ML/M2
LEFT VENTRICLE SYSTOLIC VOLUME: 29.28 ML
LEFT VENTRICULAR INTERNAL DIMENSION IN DIASTOLE: 4.72 CM (ref 3.5–6)
LEFT VENTRICULAR MASS: 262.71 G
LEUKOCYTE ESTERASE UR QL STRIP: NEGATIVE
LYMPHOCYTES # BLD AUTO: 2.2 K/UL (ref 1–4.8)
LYMPHOCYTES NFR BLD: 22.8 % (ref 18–48)
MCH RBC QN AUTO: 25.5 PG (ref 27–31)
MCHC RBC AUTO-ENTMCNC: 31.3 G/DL (ref 32–36)
MCV RBC AUTO: 82 FL (ref 82–98)
MONOCYTES # BLD AUTO: 1.2 K/UL (ref 0.3–1)
MONOCYTES NFR BLD: 12.4 % (ref 4–15)
MV PEAK A VEL: 1.06 M/S
MV PEAK E VEL: 0.84 M/S
MV STENOSIS PRESSURE HALF TIME: 111.07 MS
MV VALVE AREA P 1/2 METHOD: 1.98 CM2
NEUTROPHILS # BLD AUTO: 5.8 K/UL (ref 1.8–7.7)
NEUTROPHILS NFR BLD: 60.5 % (ref 38–73)
NITRITE UR QL STRIP: NEGATIVE
NRBC BLD-RTO: 0 /100 WBC
PH UR STRIP: 6 [PH] (ref 5–8)
PISA TR MAX VEL: 0.83 M/S
PLATELET # BLD AUTO: 260 K/UL (ref 150–350)
PMV BLD AUTO: 9.1 FL (ref 9.2–12.9)
POCT GLUCOSE: 89 MG/DL (ref 70–110)
POTASSIUM SERPL-SCNC: 4.1 MMOL/L (ref 3.5–5.1)
PROT SERPL-MCNC: 7.8 G/DL (ref 6–8.4)
PROT UR QL STRIP: NEGATIVE
PROTHROMBIN TIME: 10.4 SEC (ref 9–12.5)
PV PEAK VELOCITY: 0.71 CM/S
RA MAJOR: 5.81 CM
RA WIDTH: 3.94 CM
RBC # BLD AUTO: 5.6 M/UL (ref 4.6–6.2)
RIGHT VENTRICULAR END-DIASTOLIC DIMENSION: 3.48 CM
RV TISSUE DOPPLER FREE WALL SYSTOLIC VELOCITY 1 (APICAL 4 CHAMBER VIEW): 11.57 CM/S
SARS-COV-2 RDRP RESP QL NAA+PROBE: NEGATIVE
SINUS: 4.68 CM
SODIUM SERPL-SCNC: 135 MMOL/L (ref 136–145)
SP GR UR STRIP: 1.01 (ref 1–1.03)
STJ: 3.53 CM
TR MAX PG: 3 MMHG
TRICUSPID ANNULAR PLANE SYSTOLIC EXCURSION: 2.35 CM
URN SPEC COLLECT METH UR: NORMAL
UROBILINOGEN UR STRIP-ACNC: NEGATIVE EU/DL
WBC # BLD AUTO: 9.63 K/UL (ref 3.9–12.7)

## 2020-08-03 PROCEDURE — 99222 1ST HOSP IP/OBS MODERATE 55: CPT | Mod: ,,, | Performed by: PSYCHIATRY & NEUROLOGY

## 2020-08-03 PROCEDURE — 99285 EMERGENCY DEPT VISIT HI MDM: CPT | Mod: 25

## 2020-08-03 PROCEDURE — 82962 GLUCOSE BLOOD TEST: CPT

## 2020-08-03 PROCEDURE — 85730 THROMBOPLASTIN TIME PARTIAL: CPT

## 2020-08-03 PROCEDURE — 93010 ELECTROCARDIOGRAM REPORT: CPT | Mod: ,,, | Performed by: INTERNAL MEDICINE

## 2020-08-03 PROCEDURE — 93010 EKG 12-LEAD: ICD-10-PCS | Mod: ,,, | Performed by: INTERNAL MEDICINE

## 2020-08-03 PROCEDURE — U0002 COVID-19 LAB TEST NON-CDC: HCPCS

## 2020-08-03 PROCEDURE — 81003 URINALYSIS AUTO W/O SCOPE: CPT

## 2020-08-03 PROCEDURE — 25500020 PHARM REV CODE 255: Performed by: EMERGENCY MEDICINE

## 2020-08-03 PROCEDURE — 83036 HEMOGLOBIN GLYCOSYLATED A1C: CPT

## 2020-08-03 PROCEDURE — 93005 ELECTROCARDIOGRAM TRACING: CPT

## 2020-08-03 PROCEDURE — 85610 PROTHROMBIN TIME: CPT

## 2020-08-03 PROCEDURE — 25000003 PHARM REV CODE 250: Performed by: INTERNAL MEDICINE

## 2020-08-03 PROCEDURE — 99222 PR INITIAL HOSPITAL CARE,LEVL II: ICD-10-PCS | Mod: ,,, | Performed by: PSYCHIATRY & NEUROLOGY

## 2020-08-03 PROCEDURE — 80053 COMPREHEN METABOLIC PANEL: CPT

## 2020-08-03 PROCEDURE — 25000003 PHARM REV CODE 250: Performed by: HOSPITALIST

## 2020-08-03 PROCEDURE — 85025 COMPLETE CBC W/AUTO DIFF WBC: CPT

## 2020-08-03 PROCEDURE — 21400001 HC TELEMETRY ROOM

## 2020-08-03 RX ORDER — IBUPROFEN 200 MG
24 TABLET ORAL
Status: DISCONTINUED | OUTPATIENT
Start: 2020-08-03 | End: 2020-08-04 | Stop reason: HOSPADM

## 2020-08-03 RX ORDER — CLOPIDOGREL BISULFATE 75 MG/1
75 TABLET ORAL DAILY
Status: DISCONTINUED | OUTPATIENT
Start: 2020-08-03 | End: 2020-08-04 | Stop reason: HOSPADM

## 2020-08-03 RX ORDER — SODIUM CHLORIDE 0.9 % (FLUSH) 0.9 %
10 SYRINGE (ML) INJECTION
Status: DISCONTINUED | OUTPATIENT
Start: 2020-08-03 | End: 2020-08-04 | Stop reason: HOSPADM

## 2020-08-03 RX ORDER — ASPIRIN 325 MG
325 TABLET ORAL DAILY
Status: DISCONTINUED | OUTPATIENT
Start: 2020-08-03 | End: 2020-08-04 | Stop reason: HOSPADM

## 2020-08-03 RX ORDER — ATORVASTATIN CALCIUM 40 MG/1
40 TABLET, FILM COATED ORAL NIGHTLY
Status: DISCONTINUED | OUTPATIENT
Start: 2020-08-03 | End: 2020-08-04 | Stop reason: HOSPADM

## 2020-08-03 RX ORDER — AMLODIPINE BESYLATE 5 MG/1
10 TABLET ORAL DAILY
Status: DISCONTINUED | OUTPATIENT
Start: 2020-08-03 | End: 2020-08-04 | Stop reason: HOSPADM

## 2020-08-03 RX ORDER — INSULIN ASPART 100 [IU]/ML
1-10 INJECTION, SOLUTION INTRAVENOUS; SUBCUTANEOUS
Status: DISCONTINUED | OUTPATIENT
Start: 2020-08-03 | End: 2020-08-04 | Stop reason: HOSPADM

## 2020-08-03 RX ORDER — ATORVASTATIN CALCIUM 40 MG/1
40 TABLET, FILM COATED ORAL DAILY
Status: DISCONTINUED | OUTPATIENT
Start: 2020-08-03 | End: 2020-08-03

## 2020-08-03 RX ORDER — LEVOTHYROXINE SODIUM 100 UG/1
200 TABLET ORAL
Status: DISCONTINUED | OUTPATIENT
Start: 2020-08-04 | End: 2020-08-04 | Stop reason: HOSPADM

## 2020-08-03 RX ORDER — IBUPROFEN 200 MG
16 TABLET ORAL
Status: DISCONTINUED | OUTPATIENT
Start: 2020-08-03 | End: 2020-08-04 | Stop reason: HOSPADM

## 2020-08-03 RX ORDER — GLUCAGON 1 MG
1 KIT INJECTION
Status: DISCONTINUED | OUTPATIENT
Start: 2020-08-03 | End: 2020-08-04 | Stop reason: HOSPADM

## 2020-08-03 RX ADMIN — IOHEXOL 75 ML: 350 INJECTION, SOLUTION INTRAVENOUS at 11:08

## 2020-08-03 RX ADMIN — CLOPIDOGREL 75 MG: 75 TABLET, FILM COATED ORAL at 03:08

## 2020-08-03 RX ADMIN — ATORVASTATIN CALCIUM 40 MG: 40 TABLET, FILM COATED ORAL at 08:08

## 2020-08-03 RX ADMIN — ASPIRIN 325 MG ORAL TABLET 325 MG: 325 PILL ORAL at 03:08

## 2020-08-03 NOTE — ED PROVIDER NOTES
Encounter Date: 8/3/2020    SCRIBE #1 NOTE: I, Melanie Landa, am scribing for, and in the presence of,  Britni Lanza MD. I have scribed the following portions of the note - Other sections scribed: HPI, ROS, PE .       History     Chief Complaint   Patient presents with    MRI test     Pt states he needs a MRI seen in ED Thursday for TIA     Lorena Rodriguez is a 65 y.o. male, with a PMHx of cerebrovascular accidents, coronary artery disease, diabetes mellitus, and hypertension, who presents to the ED after suffering 3 to 4 stroke-like episodes for the last four days since he was last seen in the ED on 7/30/2020 for a TIA. Patient reports symptoms of clouded vision and slurred speech. He reports symptoms resolve after about an hour. He has not experienced these symptoms today. On chart review, he was seen several months ago for vision loss in left eye and found to have CVA.    Patient has chronic left eye visual deficit, unchanged from prior stroke. He has been in contact with his neurologist, Dr. Milian, but is unable to see him until the end of the month and was told to come to the ER. Patient is on insulin, Plavix and aspirin and reports compliance with these meds and last took them this morning. Patient denies any chest pain or shortness of breath.     The history is provided by the patient.     Review of patient's allergies indicates:  No Known Allergies  Past Medical History:   Diagnosis Date    Coronary artery disease     Diabetes mellitus     pre-diabetes    Hypertension     Stroke     multiple tia's     Past Surgical History:   Procedure Laterality Date    ARTHROSCOPIC REPAIR OF ROTATOR CUFF OF SHOULDER Right 8/20/2019    Procedure: REPAIR, ROTATOR CUFF, ARTHROSCOPIC;  Surgeon: Nj You MD;  Location: [facility];  Service: Orthopedics;  Laterality: Right;    ARTHROSCOPY OF SHOULDER WITH DECOMPRESSION OF SUBACROMIAL SPACE Right 8/20/2019    Procedure: ARTHROSCOPY, SHOULDER, WITH SUBACROMIAL SPACE  DECOMPRESSION;  Surgeon: Nj You MD;  Location: [facility];  Service: Orthopedics;  Laterality: Right;  WITH BLOCK    CHONDROPLASTY OF SHOULDER Right 8/20/2019    Procedure: CHONDROPLASTY, SHOULDER;  Surgeon: Nj You MD;  Location: [facility];  Service: Orthopedics;  Laterality: Right;    CORONARY STENT PLACEMENT      EYE SURGERY      HAND AMPUTATION THROUGH METACARPAL      right    HEMORRHOID SURGERY      HERNIA REPAIR      SHOULDER SURGERY      right     History reviewed. No pertinent family history.  Social History     Tobacco Use    Smoking status: Former Smoker     Types: Cigarettes, Cigars    Smokeless tobacco: Former User   Substance Use Topics    Alcohol use: Never     Frequency: Never    Drug use: Never     Review of Systems   Constitutional: Negative for fever.   HENT: Negative for sore throat.    Eyes: Positive for visual disturbance.   Respiratory: Negative for shortness of breath.    Cardiovascular: Negative for chest pain.   Gastrointestinal: Negative for nausea.   Genitourinary: Negative for dysuria.   Musculoskeletal: Negative for back pain.   Skin: Negative for rash.   Allergic/Immunologic: Negative for immunocompromised state.   Neurological: Positive for speech difficulty.   Hematological: Does not bruise/bleed easily.       Physical Exam     Initial Vitals [08/03/20 0949]   BP Pulse Resp Temp SpO2   (!) 164/77 68 18 97.9 °F (36.6 °C) 97 %      MAP       --         Physical Exam    Nursing note and vitals reviewed.  Constitutional: He appears well-developed and well-nourished. He is not diaphoretic. No distress.   HENT:   Head: Normocephalic and atraumatic.   Mouth/Throat: Oropharynx is clear and moist.   Eyes: EOM are normal. Pupils are equal, round, and reactive to light.   Neck: Neck supple.   Cardiovascular: Normal rate and regular rhythm.   Pulmonary/Chest: Breath sounds normal. No respiratory distress.   Abdominal: Soft. Bowel sounds are normal.   Musculoskeletal: No  edema.   Neurological: He is alert and oriented to person, place, and time. He has normal strength. No cranial nerve deficit or sensory deficit. GCS score is 15. GCS eye subscore is 4. GCS verbal subscore is 5. GCS motor subscore is 6.   Left sided griselda anopsia, no pronator drift, normal finger to nose testing   Skin: Skin is warm and dry.   Psychiatric: He has a normal mood and affect.         ED Course   Procedures  Labs Reviewed   CBC W/ AUTO DIFFERENTIAL - Abnormal; Notable for the following components:       Result Value    Mean Corpuscular Hemoglobin 25.5 (*)     Mean Corpuscular Hemoglobin Conc 31.3 (*)     RDW 14.6 (*)     MPV 9.1 (*)     Immature Grans (Abs) 0.05 (*)     Mono # 1.2 (*)     All other components within normal limits   COMPREHENSIVE METABOLIC PANEL - Abnormal; Notable for the following components:    Sodium 135 (*)     Glucose 123 (*)     Anion Gap 7 (*)     All other components within normal limits   URINALYSIS, REFLEX TO URINE CULTURE    Narrative:     Specimen Source->Urine   SARS-COV-2 RNA AMPLIFICATION, QUAL   APTT   PROTIME-INR   HEMOGLOBIN A1C   POCT GLUCOSE MONITORING CONTINUOUS   POCT GLUCOSE MONITORING CONTINUOUS        ECG Results          EKG 12-lead (Preliminary result)  Result time 08/03/20 14:12:07    ED Interpretation by Britni Lanza MD (08/03/20 14:12:07)    Normal sinus rhythm, rate 66 beats per minute, normal SC interval,  milliseconds.  No STEMI.                  In process by Interface, Lab In [facility] (08/03/20 11:00:08)                 Narrative:    Test Reason : G45.9,    Vent. Rate : 066 BPM     Atrial Rate : 066 BPM     P-R Int : 174 ms          QRS Dur : 084 ms      QT Int : 408 ms       P-R-T Axes : 002 -16 005 degrees     QTc Int : 427 ms    Normal sinus rhythm  Nonspecific T wave abnormality  Abnormal ECG  When compared with ECG of 30-JUL-2020 23:30,  Significant changes have occurred    Referred By: [email or web address]   SELF           Confirmed By:                              Imaging Results          CTA Head and Neck (xpd) (Final result)  Result time 08/03/20 13:13:04    Final result by Rod Woodward MD (08/03/20 13:13:04)                 Impression:      1. Left cerebellar hemisphere acute infarct.  No intracranial hemorrhage.  2. Left AICA not definitively seen and may be markedly hypoplastic or potentially occluded.  3. Elsewhere in the head and neck there is only mild atherosclerosis with less than 50% stenosis.  No aneurysm or dissection seen.  4. Right parasagittal occipital lobe and right cerebellar encephalomalacia consistent with remote infarcts.  5. Generalized cerebral atrophy and mild chronic small vessel ischemic change.  6. Few additional findings as above.    COMMUNICATION  This critical result was discovered/received at 1255 hours on 08/03/2020.  The critical information above was relayed directly by me by telephone to Dr. Lanza on 08/03/2020 at 13:04 hours.      Electronically signed by: Rod Woodward MD  Date:    08/03/2020  Time:    13:13             Narrative:    EXAMINATION:  CTA HEAD AND NECK (XPD)    CLINICAL HISTORY:  TIA, initial exam;    TECHNIQUE:  Non contrast low dose axial images were obtained through the head. CT angiogram was performed from the level of the ankit to the top of the head following the IV administration of 75mL of Omnipaque 350.   Sagittal and coronal reconstructions and maximum intensity projection reconstructions were performed. Arterial stenosis percentages are based on NASCET measurement criteria.    COMPARISON:  Head CT 07/30/2020 and MRI brain with MRA head and neck 06/06/2020    FINDINGS:  Beam hardening with streak artifact somewhat limits evaluation, particularly at the skull base.    Intracranial Compartment: Generalized cerebral volume loss.  The ventricles are midline and stable in size and configuration without distortion by mass effect or acute hydrocephalus.    Encephalomalacia is again seen within the  right cerebellar hemisphere and parasagittal right occipital lobe consistent with remote infarct.    Interval moderate-sized area of hypoattenuation involving the left cerebellar hemisphere and also likely extending into the cerebellar peduncle consistent with acute infarction.  No significant effacement of the 4th ventricle.  No midline shift.  No parenchymal hemorrhage or enhancing mass seen.  Grossly stable distribution of patchy hypoattenuation of the subcortical and periventricular white matter, likely sequela of mild chronic small vessel ischemic change.  No extra-axial blood or fluid collections.  Chronic empty sella configuration, nonspecific.    Skull/Extracranial Contents (limited evaluation): No fracture.  Grossly similar patchy mucosal thickening of the bilateral paranasal sinuses without air-fluid levels.  Mastoid air cells are clear.    Non-Vascular Structures of the Neck/Thoracic Inlet (limited evaluation): 2 mm calcified granuloma within the right lung apex.  No apical pneumothorax.  Age-related degenerative changes of the cervical spine most prominent at C4-5 through C5-6 levels.  No acute osseous process seen.  Patient is edentulous.    Aorta: Left-sided 3 vessel arch.  Mild to moderate scattered calcific atherosclerosis of the arch and to a lesser degree origins of the great vessels.    Extracranial carotid circulation: Mild calcific atherosclerosis of the bilateral carotid bifurcations and proximal ICAs.  No hemodynamically significant stenosis, aneurysmal dilatation, or dissection.    Extracranial vertebral circulation: Left vertebral artery is slightly dominant.  No significant atherosclerosis.  No hemodynamically significant stenosis, aneurysmal dilatation, or dissection.    Intracranial Arteries: Mild calcific atherosclerosis of the proximal V4 segment of the right vertebral artery resulting in less than 50% stenosis.  Mild calcific atherosclerosis of the bilateral cavernous ICAs resulting in  less than 50% stenosis.  The left anterior inferior cerebral artery is not definitively seen and may be markedly hypoplastic or potentially occluded.  No focal high-grade stenosis, aneurysm or occlusion elsewhere.    Venous structures (limited evaluation): Normal.                                 Medical Decision Making:   Initial Assessment:   65-year-old male presents for evaluation of possible TIA.  Since he was last seen in the emergency department, he reports 3-4 episodes of blurry vision and slurred speech.  He has residual left-sided griselda in opts EF from previous CVA, he reports compliance with his aspirin and Plavix.  I do not appreciate any new neuro deficits today aside from the left-sided griselda anopsia.  Will discuss with neurology for recommendations.            Scribe Attestation:   Scribe #1: I performed the above scribed service and the documentation accurately describes the services I performed. I attest to the accuracy of the note.            ED Course as of Aug 03 1429   Mon Aug 03, 2020   1021 Case discussed with Dr. Mitchell- he recommends obtaining CTA for this patient now, if negative give Plavix.     [LH]   1343 Case discussed with Dr. Millan for admission to hospital- acute left cerebellar infarct. Dr. Mitchell recommends continuing patient's regular plavix and aspirin dose.     [LH]      ED Course User Index  [LH] Britni Lanza MD                Clinical Impression:       ICD-10-CM ICD-9-CM   1. Cerebral infarction involving left cerebellar artery  I63.542 434.01   2. TIA (transient ischemic attack)  G45.9 435.9   3. Stroke  I63.9 434.91         Disposition:   Disposition: Admitted     ED Disposition Condition    Admit                I, Britni Lanza MD, personally performed the services described in this documentation. All medical record entries made by the scribe were at my direction and in my presence. I have reviewed the chart and agree that the record reflects my personal performance  and is accurate and complete.    This dictation has been generated using M-Modal Fluency Direct dictation; some phonetic errors may occur.          Britni Lanza MD  08/03/20 6010

## 2020-08-03 NOTE — CONSULTS
[facility]  Neurology  Consult Note    Patient Name: Lorena Rodriguez  MRN: [id number]  Admission Date: 8/3/2020  Hospital Length of Stay: 0 days  Code Status: Prior   Attending Provider: Britni Lanza MD   Consulting Provider: Harvinder Mitchell MD  Primary Care Physician: Sha Soliman MD  Principal Problem:Stroke    Consults  Subjective:     Chief Complaint:     HPI: 64 y/o male with medical Hx as listed below comes to ED after several days of blurry vision and slurred speech. He has had at least four episodes in the last four days. He attempted to see his neurologist but  No available appointment until end of this month. 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 No headaches, speech disturbances, vertigo, or numbness.    Past Medical History:   Diagnosis Date    Coronary artery disease     Diabetes mellitus     pre-diabetes    Hypertension     Stroke     multiple tia's       Past Surgical History:   Procedure Laterality Date    ARTHROSCOPIC REPAIR OF ROTATOR CUFF OF SHOULDER Right 8/20/2019    Procedure: REPAIR, ROTATOR CUFF, ARTHROSCOPIC;  Surgeon: Nj You MD;  Location: [facility];  Service: Orthopedics;  Laterality: Right;    ARTHROSCOPY OF SHOULDER WITH DECOMPRESSION OF SUBACROMIAL SPACE Right 8/20/2019    Procedure: ARTHROSCOPY, SHOULDER, WITH SUBACROMIAL SPACE DECOMPRESSION;  Surgeon: Nj You MD;  Location: [facility] [place], operated by [facility];  Service: Orthopedics;  Laterality: Right;  WITH BLOCK    CHONDROPLASTY OF SHOULDER Right 8/20/2019    Procedure: CHONDROPLASTY, SHOULDER;  Surgeon: Nj You MD;  Location: [facility];  Service: Orthopedics;  Laterality: Right;    CORONARY STENT PLACEMENT      EYE SURGERY      HAND AMPUTATION THROUGH METACARPAL      right    HEMORRHOID SURGERY      HERNIA REPAIR      SHOULDER SURGERY      right       Review of patient's allergies indicates:  No Known Allergies    Current Neurological Medications:    No current  facility-administered medications on file prior to encounter.      Current Outpatient Medications on File Prior to Encounter   Medication Sig    amLODIPine (NORVASC) 10 MG tablet Take 10 mg by mouth once daily.    aspirin 325 MG tablet Take 325 mg by mouth.    atorvastatin (LIPITOR) 40 MG tablet Take 1 tablet (40 mg total) by mouth once daily.    clopidogreL (PLAVIX) 75 mg tablet Take 75 mg by mouth once daily.    levothyroxine (SYNTHROID) 200 MCG tablet Take 1 tablet (200 mcg total) by mouth before breakfast.    metFORMIN (GLUCOPHAGE) 500 MG tablet Take 500 mg by mouth 2 (two) times daily with meals.    metoprolol succinate (TOPROL-XL) 50 MG 24 hr tablet Take 50 mg by mouth once daily.    olmesartan-hydrochlorothiazide (BENICAR HCT) 20-12.5 mg per tablet Take 1 tablet by mouth once daily.    co-enzyme Q-10 30 mg capsule Take 30 mg by mouth once daily.    NITROGLYCERIN SL Place 0.4 mg under the tongue daily as needed.    [DISCONTINUED] aspirin (ECOTRIN) 81 MG EC tablet Take 1 tablet (81 mg total) by mouth once daily. (Patient taking differently: Take by mouth once daily. )    [DISCONTINUED] ondansetron (ZOFRAN-ODT) 4 MG TbDL Take 2 tablets (8 mg total) by mouth every 8 (eight) hours as needed. (Patient not taking: Reported on 6/9/2020)    [DISCONTINUED] oxyCODONE-acetaminophen (PERCOCET)  mg per tablet Take 1 tablet by mouth every 4 (four) hours as needed. (Patient not taking: Reported on 6/9/2020)      Family History     None        Tobacco Use    Smoking status: Former Smoker     Types: Cigarettes, Cigars    Smokeless tobacco: Former User   Substance and Sexual Activity    Alcohol use: Never     Frequency: Never    Drug use: Never    Sexual activity: Not on file     Review of Systems   Constitutional: Negative for fever.   HENT: Negative for trouble swallowing.    Eyes: Negative for photophobia.   Respiratory: Negative for shortness of breath.    Cardiovascular: Negative for chest pain.    Gastrointestinal: Negative for abdominal pain.   Genitourinary: Negative for dysuria.   Musculoskeletal: Negative for back pain.   Neurological: Negative for headaches.     Objective:     Vital Signs (Most Recent):  Temp: 97.9 °F (36.6 °C) (08/03/20 0949)  Pulse: 61 (08/03/20 1502)  Resp: 18 (08/03/20 1502)  BP: 136/70 (08/03/20 1502)  SpO2: 96 % (08/03/20 1502) Vital Signs (24h Range):  Temp:  [97.9 °F (36.6 °C)] 97.9 °F (36.6 °C)  Pulse:  [61-68] 61  Resp:  [17-23] 18  SpO2:  [95 %-97 %] 96 %  BP: (136-183)/(68-86) 136/70     Weight: 117.9 kg (260 lb)  Body mass index is 39.53 kg/m².    Physical Exam  Constitutional:       General: He is not in acute distress.     Appearance: He is not ill-appearing.   HENT:      Right Ear: External ear normal.      Left Ear: External ear normal.   Eyes:      General:         Right eye: No discharge.         Left eye: No discharge.   Neck:      Musculoskeletal: No neck rigidity or muscular tenderness.   Cardiovascular:      Rate and Rhythm: Normal rate and regular rhythm.   Pulmonary:      Effort: No respiratory distress.      Breath sounds: No stridor.   Abdominal:      Palpations: Abdomen is soft.   Musculoskeletal:         General: No tenderness.   Neurological:      Mental Status: He is oriented to person, place, and time.   Psychiatric:         Speech: Speech normal.            NEUROLOGICAL EXAMINATION:      MENTAL STATUS   Oriented to person, place, and time.   Speech: speech is normal   Level of consciousness: alert     CRANIAL NERVES      CN III, IV, VI   Right pupil: Size: 3 mm. Shape: regular.   Left pupil: Size: 3 mm. Shape: regular.   Nystagmus: none   Ophthalmoparesis: none  Conjugate gaze: present     CN V   Right facial sensation deficit: none  Left facial sensation deficit: none     CN VII   Right facial weakness: none  Left facial weakness: none     CN IX, X   Palate: symmetric     CN XI   Right trapezius strength: normal  Left trapezius strength: normal     CN  XII   Tongue deviation: none     MOTOR EXAM        UE's: 5/5  RLE: 5/5; LLE: 4/5      SENSORY EXAM   Right arm light touch: normal  Left arm light touch: normal  Right leg light touch: normal  Left leg light touch: normal       Significant Labs:   CBC:   Recent Labs   Lab 08/03/20  1027   WBC 9.63   HGB 14.3   HCT 45.7        CMP:   Recent Labs   Lab 08/03/20  1027   *   *   K 4.1      CO2 28   BUN 23   CREATININE 0.9   CALCIUM 9.2   PROT 7.8   ALBUMIN 3.8   BILITOT 0.4   ALKPHOS 66   AST 22   ALT 33   ANIONGAP 7*   EGFRNONAA >60       Significant Imaging: I have reviewed all pertinent imaging results/findings within the past 24 hours.  CTA  CTA head  Impression:     1. Left cerebellar hemisphere acute infarct.  No intracranial hemorrhage.  2. Left AICA not definitively seen and may be markedly hypoplastic or potentially occluded.  3. Elsewhere in the head and neck there is only mild atherosclerosis with less than 50% stenosis.  No aneurysm or dissection seen.  4. Right parasagittal occipital lobe and right cerebellar encephalomalacia consistent with remote infarcts.  5. Generalized cerebral atrophy and mild chronic small vessel ischemic change.  6. Few additional findings as above.     COMMUNICATION  This critical result was discovered/received at 1255 hours on 08/03/2020.  The critical information above was relayed directly by me by telephone to Dr. Lanza on 08/03/2020 at 13:04 hours.        Electronically signed by: Rod Woodward MD  Date:                                            08/03/2020  Time:                                           13:13    Assessment and Plan:     64 y/o male with acute stroke     1. Stroke: acute left cerebellar infarction. Left AICA is occluded.   Pt has had previous posterior circulation stroke. Embolic?   -Recommend clopidogrel 75 mg daily . ASA 81 mg daily.   -Statin.   -Telemetry.   -MRI brain.   -Echo done on 6/2020. No LAE Good EF.   -Will monitor for  today.    Active Diagnoses:    Diagnosis Date Noted POA    PRINCIPAL PROBLEM:  Stroke [I63.9]  Yes    Diabetes mellitus [E11.9]  Yes    Obesity [E66.9]  Yes    H/O ischemic right PCA stroke [Z86.73] 07/23/2020 Not Applicable    Essential (primary) hypertension [I10] 06/06/2020 Yes    Hyperlipemia [E78.5] 06/06/2020 Yes      Problems Resolved During this Admission:    Diagnosis Date Noted Date Resolved POA    Cerebral infarction involving left cerebellar artery [I63.542] 08/03/2020 08/03/2020 Yes       VTE Risk Mitigation (From admission, onward)    None          Thank you for your consult. I will follow-up with patient. Please contact us if you have any additional questions.    Harvinder Mitchell MD  Neurology  [facility]

## 2020-08-03 NOTE — HPI
Mr. Maciel is a 66 yo male that presents to the hospital with R sided visual changes and what he says is confusion and lack of focus. Of note, the patient is a difficult historian. What I can gather is the following:  The patient has a history of a recent stroke and is on ASA and Plavix. He came to the ED on Thursday with complaints of R sided visual changes and confusion.  He was to be admitted but he left the ED AMA. He was supposed to have some outpatient imaging but unable to have and was told to come to the ED.  He currently does not have the symptoms he presented with on Thursday.  He was found by CTA to have an acute L cerebellar stroke. Neuro has been consulted.

## 2020-08-03 NOTE — SUBJECTIVE & OBJECTIVE
Past Medical History:   Diagnosis Date    Coronary artery disease     Diabetes mellitus     pre-diabetes    Hypertension     Stroke     multiple tia's       Past Surgical History:   Procedure Laterality Date    ARTHROSCOPIC REPAIR OF ROTATOR CUFF OF SHOULDER Right 8/20/2019    Procedure: REPAIR, ROTATOR CUFF, ARTHROSCOPIC;  Surgeon: Nj You MD;  Location: [facility];  Service: Orthopedics;  Laterality: Right;    ARTHROSCOPY OF SHOULDER WITH DECOMPRESSION OF SUBACROMIAL SPACE Right 8/20/2019    Procedure: ARTHROSCOPY, SHOULDER, WITH SUBACROMIAL SPACE DECOMPRESSION;  Surgeon: Nj You MD;  Location: [facility];  Service: Orthopedics;  Laterality: Right;  WITH BLOCK    CHONDROPLASTY OF SHOULDER Right 8/20/2019    Procedure: CHONDROPLASTY, SHOULDER;  Surgeon: Nj You MD;  Location: [facility];  Service: Orthopedics;  Laterality: Right;    CORONARY STENT PLACEMENT      EYE SURGERY      HAND AMPUTATION THROUGH METACARPAL      right    HEMORRHOID SURGERY      HERNIA REPAIR      SHOULDER SURGERY      right       Review of patient's allergies indicates:  No Known Allergies    No current facility-administered medications on file prior to encounter.      Current Outpatient Medications on File Prior to Encounter   Medication Sig    amLODIPine (NORVASC) 10 MG tablet Take 10 mg by mouth once daily.    aspirin 325 MG tablet Take 325 mg by mouth.    atorvastatin (LIPITOR) 40 MG tablet Take 1 tablet (40 mg total) by mouth once daily.    clopidogreL (PLAVIX) 75 mg tablet Take 75 mg by mouth once daily.    levothyroxine (SYNTHROID) 200 MCG tablet Take 1 tablet (200 mcg total) by mouth before breakfast.    metFORMIN (GLUCOPHAGE) 500 MG tablet Take 500 mg by mouth 2 (two) times daily with meals.    metoprolol succinate (TOPROL-XL) 50 MG 24 hr tablet Take 50 mg by mouth once daily.    olmesartan-hydrochlorothiazide (BENICAR HCT) 20-12.5 mg per tablet Take 1 tablet by mouth once daily.    co-enzyme  Q-10 30 mg capsule Take 30 mg by mouth once daily.    NITROGLYCERIN SL Place 0.4 mg under the tongue daily as needed.    [DISCONTINUED] aspirin (ECOTRIN) 81 MG EC tablet Take 1 tablet (81 mg total) by mouth once daily. (Patient taking differently: Take by mouth once daily. )    [DISCONTINUED] ondansetron (ZOFRAN-ODT) 4 MG TbDL Take 2 tablets (8 mg total) by mouth every 8 (eight) hours as needed. (Patient not taking: Reported on 6/9/2020)    [DISCONTINUED] oxyCODONE-acetaminophen (PERCOCET)  mg per tablet Take 1 tablet by mouth every 4 (four) hours as needed. (Patient not taking: Reported on 6/9/2020)     Family History     None        Tobacco Use    Smoking status: Former Smoker     Types: Cigarettes, Cigars    Smokeless tobacco: Former User   Substance and Sexual Activity    Alcohol use: Never     Frequency: Never    Drug use: Never    Sexual activity: Not on file     Review of Systems   Constitutional: Negative for activity change, appetite change, chills, diaphoresis, fatigue and fever.   HENT: Negative for congestion and dental problem.    Eyes: Positive for visual disturbance. Negative for discharge.   Respiratory: Negative for cough, choking, chest tightness, shortness of breath, wheezing and stridor.    Cardiovascular: Negative for chest pain, palpitations and leg swelling.   Gastrointestinal: Negative for abdominal distention, constipation, diarrhea, nausea and vomiting.   Genitourinary: Negative for difficulty urinating.   Musculoskeletal: Negative for arthralgias and back pain.   Neurological: Negative for dizziness, tremors, seizures, syncope, facial asymmetry, speech difficulty, weakness, light-headedness, numbness and headaches.   Psychiatric/Behavioral: Positive for confusion. Negative for agitation and behavioral problems.     Objective:     Vital Signs (Most Recent):  Temp: 97.9 °F (36.6 °C) (08/03/20 0949)  Pulse: 68 (08/03/20 1053)  Resp: 20 (08/03/20 1052)  BP: (!) 153/77 (08/03/20  1053)  SpO2: 96 % (08/03/20 1053) Vital Signs (24h Range):  Temp:  [97.9 °F (36.6 °C)] 97.9 °F (36.6 °C)  Pulse:  [68] 68  Resp:  [18-20] 20  SpO2:  [96 %-97 %] 96 %  BP: (153-164)/(77) 153/77     Weight: 117.9 kg (260 lb)  Body mass index is 39.53 kg/m².    Physical Exam  Vitals signs and nursing note reviewed.   Constitutional:       General: He is not in acute distress.     Appearance: He is obese. He is not ill-appearing, toxic-appearing or diaphoretic.   HENT:      Head: Normocephalic and atraumatic.   Cardiovascular:      Rate and Rhythm: Normal rate and regular rhythm.      Heart sounds: No murmur. No gallop.    Pulmonary:      Effort: Pulmonary effort is normal. No respiratory distress.      Breath sounds: No wheezing or rales.   Abdominal:      General: There is no distension.      Palpations: Abdomen is soft.   Neurological:      General: No focal deficit present.      Mental Status: He is alert and oriented to person, place, and time.      Cranial Nerves: No cranial nerve deficit.      Sensory: No sensory deficit.      Motor: No weakness.   Psychiatric:         Mood and Affect: Mood normal.         Behavior: Behavior normal.             Significant Labs:   BMP:   Recent Labs   Lab 08/03/20  1027   *   *   K 4.1      CO2 28   BUN 23   CREATININE 0.9   CALCIUM 9.2     CBC:   Recent Labs   Lab 08/03/20  1027   WBC 9.63   HGB 14.3   HCT 45.7          Significant Imaging:  CTA- Acute L cerebellar infarct

## 2020-08-03 NOTE — HOSPITAL COURSE
Mr. Maciel is a 64 yo male that presents to the hospital with R sided visual changes and what he says is confusion and lack of focus. Of note, the patient is a difficult historian. What I can gather is the following:  The patient has a history of a recent stroke and is on ASA and Plavix. He came to the ED on Thursday with complaints of R sided visual changes and confusion.  He was to be admitted but he left the ED AMA. He was supposed to have some outpatient imaging but unable to have and was told to come to the ED.  He currently does not have the symptoms he presented with on Thursday.  He was found by CTA to have an acute L cerebellar stroke. Neuro has been consulted.  MRI confirmed stroke. Echo heart- normal. No LAE or clotts.  Patient's symptoms resolved. No PT/OT needs. Discussed case with neuro- ok to D/C with ASA and Plavix and follow up with PCP and Neuro in one week. Activity as tolerated.  Diet- low NA, ADA 2000 sebastian diet.

## 2020-08-03 NOTE — H&P
[facility] Hospital Medicine  History & Physical    Patient Name: Lorena Rodriguez  MRN: [id number]  Admission Date: 8/3/2020  Attending Physician: Britni Lanza MD   Primary Care Provider: Sha Soliman MD         Patient information was obtained from patient and ER records.     Subjective:     Principal Problem:Stroke    Chief Complaint:   Chief Complaint   Patient presents with    MRI test     Pt states he needs a MRI seen in ED Thursday for TIA        HPI: Mr. Maciel is a 66 yo male that presents to the hospital with R sided visual changes and what he says is confusion and lack of focus. Of note, the patient is a difficult historian. What I can gather is the following:  The patient has a history of a recent stroke and is on ASA and Plavix. He came to the ED on Thursday with complaints of R sided visual changes and confusion.  He was to be admitted but he left the ED AMA. He was supposed to have some outpatient imaging but unable to have and was told to come to the ED.  He currently does not have the symptoms he presented with on Thursday.  He was found by CTA to have an acute L cerebellar stroke. Neuro has been consulted.       Past Medical History:   Diagnosis Date    Coronary artery disease     Diabetes mellitus     pre-diabetes    Hypertension     Stroke     multiple tia's       Past Surgical History:   Procedure Laterality Date    ARTHROSCOPIC REPAIR OF ROTATOR CUFF OF SHOULDER Right 8/20/2019    Procedure: REPAIR, ROTATOR CUFF, ARTHROSCOPIC;  Surgeon: Nj You MD;  Location: [facility];  Service: Orthopedics;  Laterality: Right;    ARTHROSCOPY OF SHOULDER WITH DECOMPRESSION OF SUBACROMIAL SPACE Right 8/20/2019    Procedure: ARTHROSCOPY, SHOULDER, WITH SUBACROMIAL SPACE DECOMPRESSION;  Surgeon: Nj You MD;  Location: [facility];  Service: Orthopedics;  Laterality: Right;  WITH BLOCK    CHONDROPLASTY OF SHOULDER Right 8/20/2019    Procedure: CHONDROPLASTY, SHOULDER;  Surgeon:  Nj You MD;  [facility];  Service: Orthopedics;  Laterality: Right;    CORONARY STENT PLACEMENT      EYE SURGERY      HAND AMPUTATION THROUGH METACARPAL      right    HEMORRHOID SURGERY      HERNIA REPAIR      SHOULDER SURGERY      right       Review of patient's allergies indicates:  No Known Allergies    No current facility-administered medications on file prior to encounter.      Current Outpatient Medications on File Prior to Encounter   Medication Sig    amLODIPine (NORVASC) 10 MG tablet Take 10 mg by mouth once daily.    aspirin 325 MG tablet Take 325 mg by mouth.    atorvastatin (LIPITOR) 40 MG tablet Take 1 tablet (40 mg total) by mouth once daily.    clopidogreL (PLAVIX) 75 mg tablet Take 75 mg by mouth once daily.    levothyroxine (SYNTHROID) 200 MCG tablet Take 1 tablet (200 mcg total) by mouth before breakfast.    metFORMIN (GLUCOPHAGE) 500 MG tablet Take 500 mg by mouth 2 (two) times daily with meals.    metoprolol succinate (TOPROL-XL) 50 MG 24 hr tablet Take 50 mg by mouth once daily.    olmesartan-hydrochlorothiazide (BENICAR HCT) 20-12.5 mg per tablet Take 1 tablet by mouth once daily.    co-enzyme Q-10 30 mg capsule Take 30 mg by mouth once daily.    NITROGLYCERIN SL Place 0.4 mg under the tongue daily as needed.    [DISCONTINUED] aspirin (ECOTRIN) 81 MG EC tablet Take 1 tablet (81 mg total) by mouth once daily. (Patient taking differently: Take by mouth once daily. )    [DISCONTINUED] ondansetron (ZOFRAN-ODT) 4 MG TbDL Take 2 tablets (8 mg total) by mouth every 8 (eight) hours as needed. (Patient not taking: Reported on 6/9/2020)    [DISCONTINUED] oxyCODONE-acetaminophen (PERCOCET)  mg per tablet Take 1 tablet by mouth every 4 (four) hours as needed. (Patient not taking: Reported on 6/9/2020)     Family History     None        Tobacco Use    Smoking status: Former Smoker     Types: Cigarettes, Cigars    Smokeless tobacco: Former User   Substance and  Sexual Activity    Alcohol use: Never     Frequency: Never    Drug use: Never    Sexual activity: Not on file     Review of Systems   Constitutional: Negative for activity change, appetite change, chills, diaphoresis, fatigue and fever.   HENT: Negative for congestion and dental problem.    Eyes: Positive for visual disturbance. Negative for discharge.   Respiratory: Negative for cough, choking, chest tightness, shortness of breath, wheezing and stridor.    Cardiovascular: Negative for chest pain, palpitations and leg swelling.   Gastrointestinal: Negative for abdominal distention, constipation, diarrhea, nausea and vomiting.   Genitourinary: Negative for difficulty urinating.   Musculoskeletal: Negative for arthralgias and back pain.   Neurological: Negative for dizziness, tremors, seizures, syncope, facial asymmetry, speech difficulty, weakness, light-headedness, numbness and headaches.   Psychiatric/Behavioral: Positive for confusion. Negative for agitation and behavioral problems.     Objective:     Vital Signs (Most Recent):  Temp: 97.9 °F (36.6 °C) (08/03/20 0949)  Pulse: 68 (08/03/20 1053)  Resp: 20 (08/03/20 1052)  BP: (!) 153/77 (08/03/20 1053)  SpO2: 96 % (08/03/20 1053) Vital Signs (24h Range):  Temp:  [97.9 °F (36.6 °C)] 97.9 °F (36.6 °C)  Pulse:  [68] 68  Resp:  [18-20] 20  SpO2:  [96 %-97 %] 96 %  BP: (153-164)/(77) 153/77     Weight: 117.9 kg (260 lb)  Body mass index is 39.53 kg/m².    Physical Exam  Vitals signs and nursing note reviewed.   Constitutional:       General: He is not in acute distress.     Appearance: He is obese. He is not ill-appearing, toxic-appearing or diaphoretic.   HENT:      Head: Normocephalic and atraumatic.   Cardiovascular:      Rate and Rhythm: Normal rate and regular rhythm.      Heart sounds: No murmur. No gallop.    Pulmonary:      Effort: Pulmonary effort is normal. No respiratory distress.      Breath sounds: No wheezing or rales.   Abdominal:      General: There  is no distension.      Palpations: Abdomen is soft.   Neurological:      General: No focal deficit present.      Mental Status: He is alert and oriented to person, place, and time.      Cranial Nerves: No cranial nerve deficit.      Sensory: No sensory deficit.      Motor: No weakness.   Psychiatric:         Mood and Affect: Mood normal.         Behavior: Behavior normal.             Significant Labs:   BMP:   Recent Labs   Lab 08/03/20  1027   *   *   K 4.1      CO2 28   BUN 23   CREATININE 0.9   CALCIUM 9.2     CBC:   Recent Labs   Lab 08/03/20  1027   WBC 9.63   HGB 14.3   HCT 45.7          Significant Imaging:  CTA- Acute L cerebellar infarct     Assessment/Plan:     * Stroke  L cerebellar infarct.  Already on ASA and Plavix. Will check echo. Likely small vessel disease from hypertension and DM II.  Neuro consult. Patient states he currently has no symptoms.       Obesity  Body mass index is 39.53 kg/m².  Weight loss as out patient       Diabetes mellitus  Will check an A1c. Accu checks/SS      H/O ischemic right PCA stroke  Recent. Already on ASA and Plavix       Hyperlipemia  Already on statin       Essential (primary) hypertension  Given Stroke was likely days ago- will start to lower BP.        Hypothyroidism- continue synthroid.     VTE Risk Mitigation (From admission, onward)    None             Alphonso Perez MD  Department of Hospital Medicine   [facility]

## 2020-08-03 NOTE — ED NOTES
Gave report to Ms. López and told her pt had to be transported to MRI with cardiac monitoring while being accompanied by nurse. Pt will be transferred to unit after the MRI.

## 2020-08-03 NOTE — ASSESSMENT & PLAN NOTE
L cerebellar infarct.  Already on ASA and Plavix. Will check echo. Likely small vessel disease from hypertension and DM II.  Neuro consult. Patient states he currently has no symptoms.

## 2020-08-03 NOTE — ED TRIAGE NOTES
"Patient was here Thursday for a "mini-stroke" but refused to be admitted. He called Neurologist Friday am & MD was on vacation for 2 weeks. He called the hotline today and states he was instructed to come to ER for MRI. States he has had "3 to 4 more strokes since Thursday night. My vision goes cloudy."   "

## 2020-08-04 ENCOUNTER — HOSPITAL ENCOUNTER (EMERGENCY)
Facility: HOSPITAL | Age: 65
Discharge: LEFT BEFORE DISCHARGE | End: 2020-08-04
Attending: EMERGENCY MEDICINE
Payer: MEDICARE

## 2020-08-04 VITALS
RESPIRATION RATE: 18 BRPM | OXYGEN SATURATION: 96 % | SYSTOLIC BLOOD PRESSURE: 139 MMHG | DIASTOLIC BLOOD PRESSURE: 70 MMHG | BODY MASS INDEX: 38.49 KG/M2 | WEIGHT: 254 LBS | HEART RATE: 90 BPM | HEIGHT: 68 IN | TEMPERATURE: 98 F

## 2020-08-04 VITALS
TEMPERATURE: 98 F | WEIGHT: 254.44 LBS | HEIGHT: 68 IN | SYSTOLIC BLOOD PRESSURE: 135 MMHG | BODY MASS INDEX: 38.56 KG/M2 | DIASTOLIC BLOOD PRESSURE: 73 MMHG | RESPIRATION RATE: 16 BRPM | HEART RATE: 74 BPM | OXYGEN SATURATION: 93 %

## 2020-08-04 DIAGNOSIS — G45.9 TIA (TRANSIENT ISCHEMIC ATTACK): Primary | ICD-10-CM

## 2020-08-04 DIAGNOSIS — I63.9 STROKE: ICD-10-CM

## 2020-08-04 PROBLEM — I63.512 ACUTE ISCHEMIC LEFT MCA STROKE: Status: ACTIVE | Noted: 2020-08-04

## 2020-08-04 PROBLEM — I63.542 CEREBRAL INFARCTION INVOLVING LEFT CEREBELLAR ARTERY: Status: ACTIVE | Noted: 2020-08-04

## 2020-08-04 LAB
ALBUMIN SERPL BCP-MCNC: 4 G/DL (ref 3.5–5.2)
ALP SERPL-CCNC: 65 U/L (ref 55–135)
ALT SERPL W/O P-5'-P-CCNC: 33 U/L (ref 10–44)
ANION GAP SERPL CALC-SCNC: 8 MMOL/L (ref 8–16)
AST SERPL-CCNC: 20 U/L (ref 10–40)
BASOPHILS # BLD AUTO: 0.06 K/UL (ref 0–0.2)
BASOPHILS NFR BLD: 0.6 % (ref 0–1.9)
BILIRUB SERPL-MCNC: 0.3 MG/DL (ref 0.1–1)
BUN SERPL-MCNC: 22 MG/DL (ref 8–23)
CALCIUM SERPL-MCNC: 9.7 MG/DL (ref 8.7–10.5)
CHLORIDE SERPL-SCNC: 100 MMOL/L (ref 95–110)
CHOLEST SERPL-MCNC: 136 MG/DL (ref 120–199)
CHOLEST/HDLC SERPL: 3.2 {RATIO} (ref 2–5)
CO2 SERPL-SCNC: 28 MMOL/L (ref 23–29)
CREAT SERPL-MCNC: 1.3 MG/DL (ref 0.5–1.4)
DIFFERENTIAL METHOD: ABNORMAL
EOSINOPHIL # BLD AUTO: 0.2 K/UL (ref 0–0.5)
EOSINOPHIL NFR BLD: 2.4 % (ref 0–8)
ERYTHROCYTE [DISTWIDTH] IN BLOOD BY AUTOMATED COUNT: 14.9 % (ref 11.5–14.5)
EST. GFR  (AFRICAN AMERICAN): >60 ML/MIN/1.73 M^2
EST. GFR  (NON AFRICAN AMERICAN): 56 ML/MIN/1.73 M^2
ESTIMATED AVG GLUCOSE: 146 MG/DL (ref 68–131)
GLUCOSE SERPL-MCNC: 125 MG/DL (ref 70–110)
GLUCOSE SERPL-MCNC: 128 MG/DL (ref 70–110)
HBA1C MFR BLD HPLC: 6.7 % (ref 4–5.6)
HCT VFR BLD AUTO: 45.4 % (ref 40–54)
HDLC SERPL-MCNC: 42 MG/DL (ref 40–75)
HDLC SERPL: 30.9 % (ref 20–50)
HGB BLD-MCNC: 14.5 G/DL (ref 14–18)
IMM GRANULOCYTES # BLD AUTO: 0.03 K/UL (ref 0–0.04)
IMM GRANULOCYTES NFR BLD AUTO: 0.3 % (ref 0–0.5)
INR PPP: 0.9 (ref 0.8–1.2)
LDLC SERPL CALC-MCNC: 64.6 MG/DL (ref 63–159)
LYMPHOCYTES # BLD AUTO: 2.5 K/UL (ref 1–4.8)
LYMPHOCYTES NFR BLD: 25.4 % (ref 18–48)
MCH RBC QN AUTO: 25.8 PG (ref 27–31)
MCHC RBC AUTO-ENTMCNC: 31.9 G/DL (ref 32–36)
MCV RBC AUTO: 81 FL (ref 82–98)
MONOCYTES # BLD AUTO: 1.4 K/UL (ref 0.3–1)
MONOCYTES NFR BLD: 14 % (ref 4–15)
NEUTROPHILS # BLD AUTO: 5.6 K/UL (ref 1.8–7.7)
NEUTROPHILS NFR BLD: 57.3 % (ref 38–73)
NONHDLC SERPL-MCNC: 94 MG/DL
NRBC BLD-RTO: 0 /100 WBC
PLATELET # BLD AUTO: 275 K/UL (ref 150–350)
PMV BLD AUTO: 9.1 FL (ref 9.2–12.9)
POCT GLUCOSE: 100 MG/DL (ref 70–110)
POCT GLUCOSE: 120 MG/DL (ref 70–110)
POTASSIUM SERPL-SCNC: 3.9 MMOL/L (ref 3.5–5.1)
PROT SERPL-MCNC: 7.8 G/DL (ref 6–8.4)
PROTHROMBIN TIME: 10.3 SEC (ref 9–12.5)
RBC # BLD AUTO: 5.61 M/UL (ref 4.6–6.2)
SODIUM SERPL-SCNC: 136 MMOL/L (ref 136–145)
T4 FREE SERPL-MCNC: 1.46 NG/DL (ref 0.71–1.51)
TRIGL SERPL-MCNC: 147 MG/DL (ref 30–150)
TSH SERPL DL<=0.005 MIU/L-ACNC: 0.03 UIU/ML (ref 0.4–4)
WBC # BLD AUTO: 9.82 K/UL (ref 3.9–12.7)

## 2020-08-04 PROCEDURE — 93010 EKG 12-LEAD: ICD-10-PCS | Mod: ,,, | Performed by: INTERNAL MEDICINE

## 2020-08-04 PROCEDURE — 99285 EMERGENCY DEPT VISIT HI MDM: CPT | Mod: 25

## 2020-08-04 PROCEDURE — 80053 COMPREHEN METABOLIC PANEL: CPT

## 2020-08-04 PROCEDURE — 85025 COMPLETE CBC W/AUTO DIFF WBC: CPT

## 2020-08-04 PROCEDURE — 85610 PROTHROMBIN TIME: CPT

## 2020-08-04 PROCEDURE — 93005 ELECTROCARDIOGRAM TRACING: CPT

## 2020-08-04 PROCEDURE — 99214 PR OFFICE/OUTPT VISIT, EST, LEVL IV, 30-39 MIN: ICD-10-PCS | Mod: ,,, | Performed by: PSYCHIATRY & NEUROLOGY

## 2020-08-04 PROCEDURE — G0378 HOSPITAL OBSERVATION PER HR: HCPCS

## 2020-08-04 PROCEDURE — 82962 GLUCOSE BLOOD TEST: CPT

## 2020-08-04 PROCEDURE — G0425 PR INPT TELEHEALTH CONSULT 30M: ICD-10-PCS | Mod: 95,G0,, | Performed by: PSYCHIATRY & NEUROLOGY

## 2020-08-04 PROCEDURE — 84443 ASSAY THYROID STIM HORMONE: CPT

## 2020-08-04 PROCEDURE — 96374 THER/PROPH/DIAG INJ IV PUSH: CPT

## 2020-08-04 PROCEDURE — G0425 INPT/ED TELECONSULT30: HCPCS | Mod: 95,G0,, | Performed by: PSYCHIATRY & NEUROLOGY

## 2020-08-04 PROCEDURE — 93010 ELECTROCARDIOGRAM REPORT: CPT | Mod: ,,, | Performed by: INTERNAL MEDICINE

## 2020-08-04 PROCEDURE — 25000003 PHARM REV CODE 250: Performed by: INTERNAL MEDICINE

## 2020-08-04 PROCEDURE — 80061 LIPID PANEL: CPT

## 2020-08-04 PROCEDURE — 84439 ASSAY OF FREE THYROXINE: CPT

## 2020-08-04 PROCEDURE — 99214 OFFICE O/P EST MOD 30 MIN: CPT | Mod: ,,, | Performed by: PSYCHIATRY & NEUROLOGY

## 2020-08-04 PROCEDURE — 63600175 PHARM REV CODE 636 W HCPCS: Performed by: EMERGENCY MEDICINE

## 2020-08-04 RX ORDER — NALOXONE HCL 0.4 MG/ML
0.4 VIAL (ML) INJECTION
Status: COMPLETED | OUTPATIENT
Start: 2020-08-04 | End: 2020-08-04

## 2020-08-04 RX ADMIN — LEVOTHYROXINE SODIUM 200 MCG: 100 TABLET ORAL at 05:08

## 2020-08-04 RX ADMIN — CLOPIDOGREL 75 MG: 75 TABLET, FILM COATED ORAL at 08:08

## 2020-08-04 RX ADMIN — ASPIRIN 325 MG ORAL TABLET 325 MG: 325 PILL ORAL at 08:08

## 2020-08-04 RX ADMIN — AMLODIPINE BESYLATE 10 MG: 5 TABLET ORAL at 08:08

## 2020-08-04 RX ADMIN — NALOXONE HYDROCHLORIDE 0.4 MG: 0.4 INJECTION, SOLUTION INTRAMUSCULAR; INTRAVENOUS; SUBCUTANEOUS at 03:08

## 2020-08-04 NOTE — PLAN OF CARE
TN introduced herself and explained the 's role. TN utilized 2 patient identifiers: Name & .  TN discussed what Help At Home means to the patient and the name of the person spouse/Jocelin, who helps at home. TN discussed with patient the things the patient is responsible for to HELP manage patient's  healthcare at home. TN taught Symptoms and Problems with patient for STROKE .Patient verbalized understanding & teachback:1.One sided weakness, 2. Blurred or double vision . Patient is insulin dependent. On plavix at home. Cardiologist is Eldon Pereira. Patient says he had another stroke 2 months. Patient prefers [facility] on [address].     20 0853   Discharge Assessment   Assessment Type Discharge Planning Assessment   Confirmed/corrected address and phone number on facesheet? Yes   Assessment information obtained from? Patient   Expected Length of Stay (days) 2   Communicated expected length of stay with patient/caregiver no   Prior to hospitilization cognitive status: Alert/Oriented   Prior to hospitalization functional status: Independent   Current cognitive status: Alert/Oriented   Current Functional Status: Independent   Lives With spouse   Able to Return to Prior Arrangements yes   Is patient able to care for self after discharge? Yes   Who are your caregiver(s) and their phone number(s)? spouse, Jocelin Rodriguez [phone]   Patient's perception of discharge disposition home or selfcare   Readmission Within the Last 30 Days no previous admission in last 30 days   Patient currently being followed by outpatient case management? No   Patient currently receives any other outside agency services? No   Equipment Currently Used at Home glucometer   Do you have any problems affording any of your prescribed medications? No   Is the patient taking medications as prescribed? yes   Does the patient have transportation home? Yes   Transportation Anticipated family or friend will provide   Does the patient  receive services at the [facility]? No   Discharge Plan A Home with family   Discharge Plan B Home with family   Patient/Family in Agreement with Plan yes     [id number] - [place] - [address] AT [facility] [address]  [address]  [place] [address]  Phone: [phone] Fax: [phone]

## 2020-08-04 NOTE — CONSULTS
[facility]  Vascular Neurology  Comprehensive Stroke Center  Tele-Consultation Note      Consults    Consulting Provider: ALONSO TILLEY  Current Providers  No providers found    Patient Location:  [facility] EMERGENCY DEPARTMENT Emergency Department  Spoke hospital nurse at bedside with patient assisting consultant.     Patient information was obtained from patient.         Assessment/Plan:     STROKE DOCUMENTATION     Acute Stroke Times:   Acute Stroke Times   Last Known Normal Date: 08/04/20  Last Known Normal Time: 1500  Stroke Team Called Date: 08/04/20  Stroke Team Called Time: 1526  Stroke Team Arrival Date: 08/04/20  Stroke Team Arrival Time: 1534  CT Interpretation Time: 1534    NIH Scale:  1a. Level of Consciousness: 0-->Alert, keenly responsive  1b. LOC Questions: 0-->Answers both questions correctly  1c. LOC Commands: 0-->Performs both tasks correctly  2. Best Gaze: 0-->Normal  3. Visual: 2-->Complete hemianopia  4. Facial Palsy: 1-->Minor paralysis (flattened nasolabial fold, asymmetry on smiling)  5a. Motor Arm, Left: 0-->No drift, limb holds 90 (or 45) degrees for full 10 secs  5b. Motor Arm, Right: 1-->Drift, limb holds 90 (or 45) degrees, but drifts down before full 10 secs, does not hit bed or other support  6a. Motor Leg, Left: 0-->No drift, leg holds 30 degree position for full 5 secs  6b. Motor Leg, Right: 0-->No drift, leg holds 30 degree position for full 5 secs  7. Limb Ataxia: 0-->Absent  8. Sensory: 0-->Normal, no sensory loss  9. Best Language: 0-->No aphasia, normal  10. Dysarthria: 1-->Mild-to-moderate dysarthria, patient slurs at least some words and, at worst, can be understood with some difficulty  11. Extinction and Inattention (formerly Neglect): 0-->No abnormality  Total (NIH Stroke Scale): 5     Modified Rainer Score: 2  Cortez Coma Scale:    ABCD2 Score:    [id number] Score:   HAS -BLED Score:   ICH Score:   Hunt & Mooney Classification:        Diagnoses:   Acute ischemic left MCA stroke  66 y/o man with recent admission for L cerebellar stroke, discharged today on DAPT, who returned to ED for aphasia, R hemiparesis, which has since improved.  Concerned for L-MCA occlusion, or new thalamic infarct.  Recommend CTA head/neck and transfer to [facility] for further workup (consider GABRIEL, malignancy workup given number of strokes in last few months).          There were no vitals taken for this visit.  Alteplase Eligible?: No  Alteplase Recommendation: Alteplase not recommended due to Recent previous stroke   Possible Interventional Revascularization Candidate? Yes    Disposition Recommendation: transfer to [facility] by  ground  stat    Subjective:     History of Present Illness:  66 y/o man with HTN, T2DM, multiple recent embolic strokes (May R occipital, June R cerebellar, Aug L cerebellar), who presents today with L-MCA syndrome.  Patient was discharged from  today after brief admission for L-cerebellar infarct.  No new deficits on discharge.  He was continued on DAPT.  Once home he felt dizzy, and then developed aphasia, R hemiparesis.  This improved after arrival to ED.        Woke up with symptoms?: no    Recent bleeding noted: no  Does the patient take any Blood Thinners? no  Medications: Antiplatelets:  aspirin and clopidogrel/Plavix      Past Medical History: hypertension, diabetes and stroke    Past Surgical History: no relevant surgical history    Family History: no relevant history    Social History: smoker (active)    Allergies: No Known Allergies No relevant allergies    Review of Systems   Constitutional: Negative for fever.   HENT: Negative for hearing loss and nosebleeds.    Eyes: Negative for photophobia.   Respiratory: Negative for chest tightness.    Cardiovascular: Negative for chest pain.   Genitourinary: Negative for dysuria.   Neurological: Positive for dizziness, speech difficulty and weakness.     Objective:   Vitals: There  were no vitals taken for this visit. BP: 135/73    CT READ: Yes  Abnormal CT L cerebellar stroke with petechial hemorrhage.     Physical Exam  Constitutional:       General: He is not in acute distress.  HENT:      Head: Normocephalic and atraumatic.   Eyes:      Pupils: Pupils are equal, round, and reactive to light.   Pulmonary:      Effort: Pulmonary effort is normal.   Neurological:      Comments: MS: A&XO3, speech fluent, follows commands, no neglect  CN: PERRL, EOMI, L HH, sensation intact, R facial droop, moderate dysarthria  Motor: R arm drift  Sens: intact to LT  Coord: no ataxia on finger to nose                   Recommended the emergency room physician to have a brief discussion with the patient and/or family if available regarding the risks and benefits of treatment, and to briefly document the occurrence of that discussion in his clinical encounter note.     The attending portion of this evaluation, treatment, and documentation was performed per Britni Nelson MD via audiovisual.    Billing code:  (time dependent stroke, complex case, unstable patient, hemorrhages, any intervention, some mimics)    · This patient has a very critical neurological condition/illness, with very high morbidity and mortality.  · There is a very high probability for acute neurological change leading to clinical and possibly life-threatening deterioration requiring highest level of physician preparedness for urgent intervention.  · There is possibility that this condition will require treatment with high risk medications as quickly as possible.  · There is also a possibility that the patient may benefit from further, more advance complex therapies (e.g. endovascular therapy) that will require prompt diagnosis and care.  · Care was coordinated with other physicians involved in the patient's care.  · Radiologic studies and laboratory data were reviewed and interpreted, and plan of care was re-assessed based on the  results.  · Diagnosis, treatment options and prognosis may have been discussed with the patient and/or family members or caregiver.  · Further advanced medical management and further evaluation is warranted for his care.      In your opinion, this was a: Tier 1 Van Positive    Consult End Time: 4:11 PM     Britni Nelson MD  [facility]  Vascular Neurology   [facility]

## 2020-08-04 NOTE — SUBJECTIVE & OBJECTIVE
Interval History: no complaints. No symptoms. Requesting discharge       Review of Systems   Constitutional: Negative for activity change, appetite change, chills, diaphoresis, fatigue and fever.   HENT: Negative for congestion and dental problem.    Eyes: Negative for discharge and visual disturbance.   Respiratory: Negative for cough, choking, chest tightness, shortness of breath, wheezing and stridor.    Cardiovascular: Negative for chest pain, palpitations and leg swelling.   Gastrointestinal: Negative for abdominal distention, constipation, diarrhea, nausea and vomiting.   Genitourinary: Negative for difficulty urinating.   Musculoskeletal: Negative for arthralgias and back pain.   Neurological: Negative for dizziness, tremors, seizures, syncope, facial asymmetry, speech difficulty, weakness, light-headedness, numbness and headaches.   Psychiatric/Behavioral: Negative for agitation, behavioral problems and confusion.     Objective:     Vital Signs (Most Recent):  Temp: 98 °F (36.7 °C) (08/04/20 0747)  Pulse: 70 (08/04/20 0747)  Resp: 16 (08/04/20 0747)  BP: 137/75 (08/04/20 0747)  SpO2: 95 % (08/04/20 0747) Vital Signs (24h Range):  Temp:  [97.5 °F (36.4 °C)-98.4 °F (36.9 °C)] 98 °F (36.7 °C)  Pulse:  [61-70] 70  Resp:  [16-23] 16  SpO2:  [93 %-97 %] 95 %  BP: (127-183)/(64-86) 137/75     Weight: 115.4 kg (254 lb 6.6 oz)  Body mass index is 38.68 kg/m².    Intake/Output Summary (Last 24 hours) at 8/4/2020 1111  Last data filed at 8/3/2020 2300  Gross per 24 hour   Intake 200 ml   Output --   Net 200 ml      Physical Exam  Vitals signs and nursing note reviewed.   Constitutional:       General: He is not in acute distress.     Appearance: He is obese. He is not ill-appearing, toxic-appearing or diaphoretic.   HENT:      Head: Normocephalic and atraumatic.   Cardiovascular:      Rate and Rhythm: Normal rate and regular rhythm.      Heart sounds: No murmur. No gallop.    Pulmonary:      Effort: Pulmonary effort is  normal. No respiratory distress.      Breath sounds: No wheezing or rales.   Abdominal:      General: There is no distension.      Palpations: Abdomen is soft.   Neurological:      General: No focal deficit present.      Mental Status: He is alert and oriented to person, place, and time.      Cranial Nerves: No cranial nerve deficit.      Sensory: No sensory deficit.      Motor: No weakness.   Psychiatric:         Mood and Affect: Mood normal.         Behavior: Behavior normal.         Significant Labs:   BMP:   Recent Labs   Lab 08/03/20  1027   *   *   K 4.1      CO2 28   BUN 23   CREATININE 0.9   CALCIUM 9.2     CBC:   Recent Labs   Lab 08/03/20  1027   WBC 9.63   HGB 14.3   HCT 45.7          Significant Imaging:

## 2020-08-04 NOTE — PROGRESS NOTES
[facility] WRITTEN HEALTHCARE AND DISCHARGE INFORMATION :  Follow-up Information     Sha Soliman MD On 8/13/2020.    Specialty: Family Medicine  Why: out patient services: 1:30pm follow up from the hospital  Contact information:  [address] [place] [address] [address]  [address]  [place] [id number] [phone]                   Help at Home           [phone]  After discharge for assistance [facility] On Call Nurse Care Line 24/7  Assistance   Things You are responsible For To Manage Your Care At Home:  1.    Getting your prescriptions filled   2.    Taking your medications as directed, DO NOT MISS ANY DOSES!  3.    Going to your follow-up doctor appointment. This is important because it  allow the doctor to monitor your progress and determine if  any changes need to made to your treatment plan.   Thank you for choosing [facility] for your care.  Please answer any calls you may receive from [facility] we want to continue to support you as you manage your healthcare needs. [facility] is happy to have the opportunity to serve you.    Sincerely,  Your [facility] Team,  Brittany Alejandro RN, BSN, STN [facility]  8/4/2020  [phone]

## 2020-08-04 NOTE — PROGRESS NOTES
"[facility]  Neurology  Progress Note    Patient Name: Lorena Rodriguez  MRN: [id number]  Admission Date: 8/3/2020  Hospital Length of Stay: 1 days  Code Status: Full Code   Attending Provider: Alphonso Millan MD  Primary Care Physician: Sha Soliman MD   Principal Problem:Stroke    Subjective:     Interval History: 66 y/o male with medical Hx as listed below comes to ED after several days of blurry vision and slurred speech. He has had at least four episodes in the last four days. He attempted to see his neurologist but  No available appointment until end of this month. 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 No headaches, speech disturbances, vertigo, or numbness.    -8/4/20: Mr. Rodriguez reports no new symptoms. "I just wan to go home".    Current Neurological Medications:     Current Facility-Administered Medications   Medication Dose Route Frequency Provider Last Rate Last Dose    amLODIPine tablet 10 mg  10 mg Oral Daily Alphonso Millan MD   10 mg at 08/04/20 0842    aspirin tablet 325 mg  325 mg Oral Daily Alphonso Millan MD   325 mg at 08/04/20 0842    atorvastatin tablet 40 mg  40 mg Oral QHS Andreas Sifuentes Jr., NP   40 mg at 08/03/20 2038    clopidogreL tablet 75 mg  75 mg Oral Daily Alphonso Millan MD   75 mg at 08/04/20 0842    dextrose 50% injection 12.5 g  12.5 g Intravenous PRN Alphonso Millan MD        dextrose 50% injection 25 g  25 g Intravenous PRN Alphonso Millan MD        glucagon (human recombinant) injection 1 mg  1 mg Intramuscular PRN Alphonso Millan MD        glucose chewable tablet 16 g  16 g Oral PRN Alphonso Millan MD        glucose chewable tablet 24 g  24 g Oral PRN Alphonso Millan MD        insulin aspart U-100 pen 1-10 Units  1-10 Units Subcutaneous QID (AC + HS) PRN Alphonso Millan MD        levothyroxine tablet 200 mcg  200 mcg Oral Before breakfast Alphonso Millan MD   200 mcg at " 08/04/20 0547    sodium chloride 0.9% flush 10 mL  10 mL Intravenous PRN Britni Lanza MD           Review of Systems   Constitutional: Negative for fever.   HENT: Negative for trouble swallowing.    Eyes: Negative for photophobia.   Respiratory: Negative for shortness of breath.    Cardiovascular: Negative for chest pain.   Gastrointestinal: Negative for abdominal pain.   Genitourinary: Negative for dysuria.   Musculoskeletal: Negative for back pain.   Neurological: Negative for headaches.     Objective:     Vital Signs (Most Recent):  Temp: 98 °F (36.7 °C) (08/04/20 0747)  Pulse: 70 (08/04/20 0747)  Resp: 16 (08/04/20 0747)  BP: 137/75 (08/04/20 0747)  SpO2: 95 % (08/04/20 0747) Vital Signs (24h Range):  Temp:  [97.5 °F (36.4 °C)-98.4 °F (36.9 °C)] 98 °F (36.7 °C)  Pulse:  [61-70] 70  Resp:  [16-23] 16  SpO2:  [93 %-97 %] 95 %  BP: (127-183)/(64-86) 137/75     Weight: 115.4 kg (254 lb 6.6 oz)  Body mass index is 38.68 kg/m².    Physical Exam  Constitutional:       General: He is not in acute distress.     Appearance: He is not ill-appearing.   HENT:      Right Ear: External ear normal.      Left Ear: External ear normal.   Eyes:      General:         Right eye: No discharge.         Left eye: No discharge.   Neck:      Musculoskeletal: No neck rigidity or muscular tenderness.   Cardiovascular:      Rate and Rhythm: Normal rate and regular rhythm.   Pulmonary:      Effort: No respiratory distress.      Breath sounds: No stridor.   Abdominal:      Palpations: Abdomen is soft.   Musculoskeletal:         General: No tenderness.   Neurological:      Mental Status: He is oriented to person, place, and time.   Psychiatric:         Speech: Speech normal.            NEUROLOGICAL EXAMINATION:      MENTAL STATUS   Oriented to person, place, and time.   Speech: speech is normal   Level of consciousness: alert     CRANIAL NERVES      CN III, IV, VI   Right pupil: Size: 3 mm. Shape: regular.   Left pupil: Size: 3 mm.  Shape: regular.   Nystagmus: none   Ophthalmoparesis: none  Conjugate gaze: present     CN V   Right facial sensation deficit: none  Left facial sensation deficit: none     CN VII   Right facial weakness: none  Left facial weakness: none     CN IX, X   Palate: symmetric     CN XI   Right trapezius strength: normal  Left trapezius strength: normal     CN XII   Tongue deviation: none     MOTOR EXAM        UE's: 5/5  RLE: 5/5; LLE: 4/5      SENSORY EXAM   Right arm light touch: normal  Left arm light touch: normal  Right leg light touch: normal  Left leg light touch: normal       Significant Labs:   CBC:   Recent Labs   Lab 08/03/20  1027 08/04/20  1520   WBC 9.63 9.82   HGB 14.3 14.5   HCT 45.7 45.4    275     CMP:   Recent Labs   Lab 08/03/20  1027 08/04/20  1520   * 128*   * 136   K 4.1 3.9    100   CO2 28 28   BUN 23 22   CREATININE 0.9 1.3   CALCIUM 9.2 9.7   PROT 7.8 7.8   ALBUMIN 3.8 4.0   BILITOT 0.4 0.3   ALKPHOS 66 65   AST 22 20   ALT 33 33   ANIONGAP 7* 8   EGFRNONAA >60 56*       Significant Imaging: I have reviewed all pertinent imaging results/findings within the past 24 hours.  MRI brain  Impression:     1. Left cerebellar hemisphere acute infarction.  Internal gradient susceptibility is seen presumably representing small amount of internal hemorrhagic conversion.  Close monitoring and short-term CT follow-up are recommended.  2. Small acute right occipital lobe infarct.  3. Remote infarcts with encephalomalacia involving the right cerebellar hemisphere and right occipital lobe.  This report was flagged in Epic as abnormal.        Electronically signed by: Derek Marroquin MD  Date:                                            08/03/2020  Time:                                           17:11    Assessment and Plan:     66 y/o male with acute stroke     1. Stroke: acute left cerebellar infarction and right occipital infarct. Left AICA is occluded.            Pt has had previous  posterior circulation strokes. Embolic? Etiology is unclear a this point. New echo shows mod LAE; may need GABRIEL if this repeats.           -Recommend clopidogrel 75 mg daily . ASA 81 mg daily.           -Statin.           -No arrhythmias on telemetry.    -Neurology follow up.    Active Diagnoses:    Diagnosis Date Noted POA    PRINCIPAL PROBLEM:  Stroke [I63.9]  Yes    Diabetes mellitus [E11.9]  Yes    Obesity [E66.9]  Yes    H/O ischemic right PCA stroke [Z86.73] 07/23/2020 Not Applicable    Essential (primary) hypertension [I10] 06/06/2020 Yes    Hyperlipemia [E78.5] 06/06/2020 Yes      Problems Resolved During this Admission:    Diagnosis Date Noted Date Resolved POA    Cerebral infarction involving left cerebellar artery [I63.542] 08/03/2020 08/03/2020 Yes       VTE Risk Mitigation (From admission, onward)         Ordered     IP VTE HIGH RISK PATIENT  Once      08/03/20 1835     Place sequential compression device  Until discontinued      08/03/20 1835                Harvinder Mitchell MD  Neurology  [facility]

## 2020-08-04 NOTE — DISCHARGE SUMMARY
[facility] Hospital Medicine  Discharge Summary      Patient Name: Lorena Rodriguez  MRN: [id number]  Admission Date: 8/3/2020  Hospital Length of Stay: 1 days  Discharge Date and Time:  08/04/2020 11:15 AM  Attending Physician: Alphonso Millan MD   Discharging Provider: Alphonso Millan MD  Primary Care Provider: Sha Soliman MD      HPI:   Mr. Maciel is a 64 yo male that presents to the hospital with R sided visual changes and what he says is confusion and lack of focus. Of note, the patient is a difficult historian. What I can gather is the following:  The patient has a history of a recent stroke and is on ASA and Plavix. He came to the ED on Thursday with complaints of R sided visual changes and confusion.  He was to be admitted but he left the ED AMA. He was supposed to have some outpatient imaging but unable to have and was told to come to the ED.  He currently does not have the symptoms he presented with on Thursday.  He was found by CTA to have an acute L cerebellar stroke. Neuro has been consulted.       * No surgery found *      Hospital Course:   Mr. Maciel is a 64 yo male that presents to the hospital with R sided visual changes and what he says is confusion and lack of focus. Of note, the patient is a difficult historian. What I can gather is the following:  The patient has a history of a recent stroke and is on ASA and Plavix. He came to the ED on Thursday with complaints of R sided visual changes and confusion.  He was to be admitted but he left the ED AMA. He was supposed to have some outpatient imaging but unable to have and was told to come to the ED.  He currently does not have the symptoms he presented with on Thursday.  He was found by CTA to have an acute L cerebellar stroke. Neuro has been consulted.  MRI confirmed stroke. Echo heart- normal. No LAE or clotts.  Patient's symptoms resolved. No PT/OT needs. Discussed case with neuro- ok to D/C with ASA and Plavix and follow up  with PCP and Neuro in one week. Activity as tolerated.  Diet- low NA, ADA 2000 sebastian diet.      Consults:   Consults (From admission, onward)        Status Ordering Provider     Inpatient consult to neurology  Once     Provider:  Harvinder Mitchell MD    Completed YOSELIN YANES          No new Assessment & Plan notes have been filed under this hospital service since the last note was generated.  Service: Hospital Medicine    Final Active Diagnoses:    Diagnosis Date Noted POA    PRINCIPAL PROBLEM:  Stroke [I63.9]  Yes    Diabetes mellitus [E11.9]  Yes    Obesity [E66.9]  Yes    H/O ischemic right PCA stroke [Z86.73] 07/23/2020 Not Applicable    Essential (primary) hypertension [I10] 06/06/2020 Yes    Hyperlipemia [E78.5] 06/06/2020 Yes      Problems Resolved During this Admission:    Diagnosis Date Noted Date Resolved POA    Cerebral infarction involving left cerebellar artery [I63.542] 08/03/2020 08/03/2020 Yes       Discharged Condition: good    Disposition:  Home     Follow Up:  Follow-up Information     Sha Soliman MD On 8/13/2020.    Specialty: Family Medicine  Why: out patient services: 1:30pm follow up from the hospital  Contact information:  [address] [place] [address]  [address]  [facility] [id number] [phone]             Sha Soliman MD In 1 week.    Specialty: Family Medicine  Contact information:  [address] [place] [address]  [address]  [facility] [id number] [phone]             Dante Lincoln MD In 1 week.    Specialty: Neurology  Contact information:  [address] [facility] [address]  [address]  [facility] [id number] [phone]                 Patient Instructions:   No discharge procedures on file.    Significant Diagnostic Studies:     Pending Diagnostic Studies:     None         Medications:  Reconciled Home Medications:      Medication List      CONTINUE taking these medications    amLODIPine 10 MG tablet  Commonly known as: NORVASC  Take 10 mg by mouth once daily.     aspirin 325 MG tablet  Take  325 mg by mouth.     atorvastatin 40 MG tablet  Commonly known as: LIPITOR  Take 1 tablet (40 mg total) by mouth once daily.     clopidogreL 75 mg tablet  Commonly known as: PLAVIX  Take 75 mg by mouth once daily.     co-enzyme Q-10 30 mg capsule  Take 30 mg by mouth once daily.     levothyroxine 200 MCG tablet  Commonly known as: SYNTHROID  Take 1 tablet (200 mcg total) by mouth before breakfast.     metFORMIN 500 MG tablet  Commonly known as: GLUCOPHAGE  Take 500 mg by mouth 2 (two) times daily with meals.     metoprolol succinate 50 MG 24 hr tablet  Commonly known as: TOPROL-XL  Take 50 mg by mouth once daily.     NITROGLYCERIN SL  Place 0.4 mg under the tongue daily as needed.     olmesartan-hydrochlorothiazide 20-12.5 mg per tablet  Commonly known as: BENICAR HCT  Take 1 tablet by mouth once daily.            Indwelling Lines/Drains at time of discharge:   Lines/Drains/Airways     None                 Time spent on the discharge of patient: < 30 minutes  Patient was seen and examined on the date of discharge and determined to be suitable for discharge.         Alphonso Perez MD  Department of Hospital Medicine  [facility]

## 2020-08-04 NOTE — SUBJECTIVE & OBJECTIVE
Woke up with symptoms?: no    Recent bleeding noted: no  Does the patient take any Blood Thinners? no  Medications: Antiplatelets:  aspirin and clopidogrel/Plavix      Past Medical History: hypertension, diabetes and stroke    Past Surgical History: no relevant surgical history    Family History: no relevant history    Social History: smoker (active)    Allergies: No Known Allergies No relevant allergies    Review of Systems   Constitutional: Negative for fever.   HENT: Negative for hearing loss and nosebleeds.    Eyes: Negative for photophobia.   Respiratory: Negative for chest tightness.    Cardiovascular: Negative for chest pain.   Genitourinary: Negative for dysuria.   Neurological: Positive for dizziness, speech difficulty and weakness.     Objective:   Vitals: There were no vitals taken for this visit. BP: 135/73    CT READ: Yes  Abnormal CT L cerebellar stroke with petechial hemorrhage.     Physical Exam  Constitutional:       General: He is not in acute distress.  HENT:      Head: Normocephalic and atraumatic.   Eyes:      Pupils: Pupils are equal, round, and reactive to light.   Pulmonary:      Effort: Pulmonary effort is normal.   Neurological:      Comments: MS: A&XO3, speech fluent, follows commands, no neglect  CN: PERRL, EOMI, L HH, sensation intact, R facial droop, moderate dysarthria  Motor: R arm drift  Sens: intact to LT  Coord: no ataxia on finger to nose

## 2020-08-04 NOTE — PROGRESS NOTES
[facility] Hospital Medicine  Progress Note    Patient Name: Lorena Rodriguez  MRN: [id number]  Patient Class: IP- Inpatient   Admission Date: 8/3/2020  Length of Stay: 1 days  Attending Physician: Alphonso Millan MD  Primary Care Provider: Sha Soliman MD        Subjective:     Principal Problem:Stroke        HPI:  Mr. Maciel is a 64 yo male that presents to the hospital with R sided visual changes and what he says is confusion and lack of focus. Of note, the patient is a difficult historian. What I can gather is the following:  The patient has a history of a recent stroke and is on ASA and Plavix. He came to the ED on Thursday with complaints of R sided visual changes and confusion.  He was to be admitted but he left the ED AMA. He was supposed to have some outpatient imaging but unable to have and was told to come to the ED.  He currently does not have the symptoms he presented with on Thursday.  He was found by CTA to have an acute L cerebellar stroke. Neuro has been consulted.       Overview/Hospital Course:  Mr. Maciel is a 64 yo male that presents to the hospital with R sided visual changes and what he says is confusion and lack of focus. Of note, the patient is a difficult historian. What I can gather is the following:  The patient has a history of a recent stroke and is on ASA and Plavix. He came to the ED on Thursday with complaints of R sided visual changes and confusion.  He was to be admitted but he left the ED AMA. He was supposed to have some outpatient imaging but unable to have and was told to come to the ED.  He currently does not have the symptoms he presented with on Thursday.  He was found by CTA to have an acute L cerebellar stroke. Neuro has been consulted.  MRI confirmed stroke. Echo heart- normal. No LAE or clotts.  Patient's symptoms resolved. No PT/OT needs. Discussed case with neuro- ok to D/C with ASA and Plavix and follow up with PCP and Neuro in one week. Activity as  tolerated.  Diet- low NA, ADA 2000 sebastian diet.     Interval History: no complaints. No symptoms. Requesting discharge       Review of Systems   Constitutional: Negative for activity change, appetite change, chills, diaphoresis, fatigue and fever.   HENT: Negative for congestion and dental problem.    Eyes: Negative for discharge and visual disturbance.   Respiratory: Negative for cough, choking, chest tightness, shortness of breath, wheezing and stridor.    Cardiovascular: Negative for chest pain, palpitations and leg swelling.   Gastrointestinal: Negative for abdominal distention, constipation, diarrhea, nausea and vomiting.   Genitourinary: Negative for difficulty urinating.   Musculoskeletal: Negative for arthralgias and back pain.   Neurological: Negative for dizziness, tremors, seizures, syncope, facial asymmetry, speech difficulty, weakness, light-headedness, numbness and headaches.   Psychiatric/Behavioral: Negative for agitation, behavioral problems and confusion.     Objective:     Vital Signs (Most Recent):  Temp: 98 °F (36.7 °C) (08/04/20 0747)  Pulse: 70 (08/04/20 0747)  Resp: 16 (08/04/20 0747)  BP: 137/75 (08/04/20 0747)  SpO2: 95 % (08/04/20 0747) Vital Signs (24h Range):  Temp:  [97.5 °F (36.4 °C)-98.4 °F (36.9 °C)] 98 °F (36.7 °C)  Pulse:  [61-70] 70  Resp:  [16-23] 16  SpO2:  [93 %-97 %] 95 %  BP: (127-183)/(64-86) 137/75     Weight: 115.4 kg (254 lb 6.6 oz)  Body mass index is 38.68 kg/m².    Intake/Output Summary (Last 24 hours) at 8/4/2020 1111  Last data filed at 8/3/2020 2300  Gross per 24 hour   Intake 200 ml   Output --   Net 200 ml      Physical Exam  Vitals signs and nursing note reviewed.   Constitutional:       General: He is not in acute distress.     Appearance: He is obese. He is not ill-appearing, toxic-appearing or diaphoretic.   HENT:      Head: Normocephalic and atraumatic.   Cardiovascular:      Rate and Rhythm: Normal rate and regular rhythm.      Heart sounds: No murmur. No  gallop.    Pulmonary:      Effort: Pulmonary effort is normal. No respiratory distress.      Breath sounds: No wheezing or rales.   Abdominal:      General: There is no distension.      Palpations: Abdomen is soft.   Neurological:      General: No focal deficit present.      Mental Status: He is alert and oriented to person, place, and time.      Cranial Nerves: No cranial nerve deficit.      Sensory: No sensory deficit.      Motor: No weakness.   Psychiatric:         Mood and Affect: Mood normal.         Behavior: Behavior normal.         Significant Labs:   BMP:   Recent Labs   Lab 08/03/20  1027   *   *   K 4.1      CO2 28   BUN 23   CREATININE 0.9   CALCIUM 9.2     CBC:   Recent Labs   Lab 08/03/20  1027   WBC 9.63   HGB 14.3   HCT 45.7          Significant Imaging:       Assessment/Plan:      * Stroke  L cerebellar infarct.  Already on ASA and Plavix. Will check echo. Likely small vessel disease from hypertension and DM II.  Neuro consult. Patient states he currently has no symptoms.    ASA and Plavix. No symptoms. Discussed with Neuro. OK to d/c and follow up out patient     Obesity  Body mass index is 39.53 kg/m².  Weight loss as out patient       Diabetes mellitus  Will check an A1c. Accu checks/SS      H/O ischemic right PCA stroke  Recent. Already on ASA and Plavix       Hyperlipemia  Already on statin       Essential (primary) hypertension  Permissive hypertension for now.         VTE Risk Mitigation (From admission, onward)         Ordered     IP VTE HIGH RISK PATIENT  Once      08/03/20 1835     Place sequential compression device  Until discontinued      08/03/20 1835                Will d/c to home         Alphonso Perez MD  Department of Hospital Medicine   [facility]

## 2020-08-04 NOTE — NURSING
1910 Received bedside report from ANNIE López. Patient alert and oriented. Comfortably sitting in the chair. No pain. No sign of distress. IV line intact - saline locked. On room air. Cardiac monitor in place. Reported independently walking to the bathroom without difficulties. Call bell given on his reach. Instructed to call for assistance all the time. Bed on lowest position.

## 2020-08-04 NOTE — ED NOTES
"Patient had attempted to refuse EMS and not come to the hospital. Once he was here and more alert he asked to be discharged home. Was able to discuss with patient the importance of further testing and to be evaluated by neuro specialist at [facility] because the risk for additional and more severe strokes is high. Patient states, "I know. I am so sorry to upset you but if I have a big stroke they can just pull the plug. I don't think there's anything that can be done. I just want to go home." Patient very pleasant but adament he wants to leave AMA.   "

## 2020-08-04 NOTE — ED PROVIDER NOTES
EM PHYSICIAN NOTE    HPI  This patient presents with a complaint of No chief complaint on file.      HPI: Lorena Rodriguez is a 65 year old male with a history of multiple TIA's who presents to the ED via EMS with a sudden onset of left-sided weakness, facial droop, and speech difficulty beginning 45 minutes ago. Per EMS, he was found ambulatory on scene with these symptoms, and rapidly deteriorated. CBG of 153. Patient is typically verbal at baseline, however was only able to nod yes and no to questions today. Denies any pain. Past medical history significant for CAD and HTN. Known disconjugate vision.    History is limited due to the condition of the patient.    REVIEW of PMH, SOC History and Family History:  Past Medical History:   Diagnosis Date    Coronary artery disease     Diabetes mellitus     pre-diabetes    Hypertension     Stroke     multiple tia's     Past Surgical History:   Procedure Laterality Date    ARTHROSCOPIC REPAIR OF ROTATOR CUFF OF SHOULDER Right 8/20/2019    Procedure: REPAIR, ROTATOR CUFF, ARTHROSCOPIC;  Surgeon: Nj You MD;  Location: [facility] OR;  Service: Orthopedics;  Laterality: Right;    ARTHROSCOPY OF SHOULDER WITH DECOMPRESSION OF SUBACROMIAL SPACE Right 8/20/2019    Procedure: ARTHROSCOPY, SHOULDER, WITH SUBACROMIAL SPACE DECOMPRESSION;  Surgeon: Nj You MD;  Location: [facility] OR;  Service: Orthopedics;  Laterality: Right;  WITH BLOCK    CHONDROPLASTY OF SHOULDER Right 8/20/2019    Procedure: CHONDROPLASTY, SHOULDER;  Surgeon: Nj You MD;  Location: [facility] OR;  Service: Orthopedics;  Laterality: Right;    CORONARY STENT PLACEMENT      EYE SURGERY      HAND AMPUTATION THROUGH METACARPAL      right    HEMORRHOID SURGERY      HERNIA REPAIR      SHOULDER SURGERY      right     Review of patient's allergies indicates:  No Known Allergies    Family history and social history reviewed.      REVIEW of SYSTEMS  Source: EMS, Patient, Limited secondary to acuity  The  "nurse's notes and triage vital signs were reviewed.  NEUROLOGICAL: See HPI  The remainder of the ROS is negative.        PHYSICAL EXAMINATION    /70 (BP Location: Right arm, Patient Position: Lying)   Pulse 90   Temp 97.9 °F (36.6 °C) (Oral)   Resp 18   Ht 5' 8" (1.727 m)   Wt 115.2 kg (254 lb)   SpO2 96%   BMI 38.62 kg/m²       Vital signs and Pulse Ox reviewed in clinical context. Abnormalities noted: wnl  Pt's level of consciousness is lethargic, and the patient is in distress.  Skin: warm, pink and dry  Mucosa:moist  Head and Neck: Right-sided facial droop.  Eyes: Pupils are 2-3 mm bilaterally. 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 Expressive aphasia. 5/5 strength of the bilateral lower extremities and left upper extremity.     Initial Impression: 65 year old male arrives via EMS with right-sided weakness, facial asymmetry, and expressive aphasia. Nods/shakes head to some questions. POCT glucose greater than 100.  Plan: Based on these symptoms and my findings, I ordered a code stroke to be called overhead for immediate intervention and neurology consult.  Ordered a CBC, CMP, protime-INR, TSH, LDL, and CT head without contrast. Given naloxone 0.4 mg.  Romy Arnold MD, 3:18 PM 8/4/2020      Medical decision making:   Nurses notes and Vital Signs reviewed.  Orders Placed This Encounter   Procedures    CT Head Without Contrast    CBC W/ AUTO DIFFERENTIAL    Comprehensive metabolic panel    Protime-INR    TSH    LDL - Lipid Panel    Diet NPO    Cardiac Monitoring - Adult    Vital signs    Neuro checks Every 30 minutes until admission orders completed or patient transferred. If patient receives " tPA, follow tPA monitoring flowsheet.    Nursing swallow assessment    Inpatient consult to Telemedicine-Stroke    Pulse Oximetry Continuous    Oxygen Continuous    POCT glucose    ECG 12 lead    Insert peripheral IV       ED Course as of Aug 04 1707   Tue Aug 04, 2020   1516 Seen at door when EMS arrived.  Patient with right-sided facial droop, expressive aphasia and right arm weakness.  Concerning for LVO.    [MH]   1518 Pt very lethargic.  Patient reports that he did take pain medicine today but is not sure what it was.  Does confused and his pupils are 2-3 mm bilaterally.  Chart review does not reveal of the patient is on opiates but I have ordered a dose of Narcan.  Nurse will accompany patient to CT and monitor pulse oximetry.    [MH]   1545 Symptoms largely resolved in its CT scan.  Patient is now left with a mild pronator drift and dysarthria.    [MH]   1548 Per telestroke team: obtain CTA head and neck, and likely transfer to [facility].    [MH]   1548 No change after narcan...  Symptoms improved before Narcan was given    []      ED Course User Index  [MH] Romy Arnold MD       Diagnoses that have been ruled out:   None   Diagnoses that are still under consideration:   None   Final diagnoses:   Stroke         Disposition:   Disposition: AMA  Condition: Fair    Follow-up Information    None       ED Prescriptions     None         Medical decision making:  Patient is adamant that he does not wish to be admitted or transferred.  He is not wish to stay for further testing.  He demonstrates the capacity to understand the risks versus benefits.  I expressed my concern about the possibility of him having a permanent stroke leaving him debilitated or dead and again he is not willing to stay.    This note was created using Dictation Software.  This program may occasionally misinterpret certain words and phrases.      SCRIBE ATTESTATION NOTE:  I attest that I personally performed the services documented by  the scribe and acknowledged and confirm the content of the note.   Nurses notes were reviewed.  Romy Arnold        Nurses notes were reviewed.     Romy Arnold MD  08/04/20 3902

## 2020-08-04 NOTE — PLAN OF CARE
Problem: Fall Injury Risk  Goal: Absence of Fall and Fall-Related Injury  Outcome: Ongoing, Progressing  Intervention: Identify and Manage Contributors to Fall Injury Risk  Flowsheets (Taken 8/4/2020 0640)  Self-Care Promotion:   independence encouraged   BADL personal objects within reach  Medication Review/Management: medications reviewed  Intervention: Promote Injury-Free Environment  Flowsheets (Taken 8/4/2020 0640)  Safety Promotion/Fall Prevention:   assistive device/personal item within reach   instructed to call staff for mobility   side rails raised x 2   lighting adjusted  Environmental Safety Modification:   assistive device/personal items within reach   clutter free environment maintained

## 2020-08-04 NOTE — PLAN OF CARE
08/04/20 1248   Final Note   Assessment Type Final Discharge Note   Anticipated Discharge Disposition Home   Hospital Follow Up  Appt(s) scheduled? Yes   Discharge plans and expectations educations in teach back method with documentation complete? Yes   Right Care Referral Info   Post Acute Recommendation No Care   Post-Acute Status   Post-Acute Authorization Other   Other Status No Post-Acute Service Needs   Discharge Delays None known at this time

## 2020-08-04 NOTE — NURSING
Arrived on unit on stretcher awake and alert. Patient oriented to surroundings  Bed low call light in reach  Plan of care reviewed with patient .

## 2020-08-04 NOTE — ED TRIAGE NOTES
Pt presents to the ED via ambulance with stroke symptoms and right sided weakness. Pt was asymptomatic upon discharge earlier today. Once home EMS was called because. Pt had a facial droop, lethargic, and expressive aphasia on arrival. Brought directly to STAT CT.

## 2020-08-04 NOTE — ASSESSMENT & PLAN NOTE
L cerebellar infarct.  Already on ASA and Plavix. Will check echo. Likely small vessel disease from hypertension and DM II.  Neuro consult. Patient states he currently has no symptoms.    ASA and Plavix. No symptoms. Discussed with Neuro. OK to d/c and follow up out patient

## 2020-08-04 NOTE — NURSING
Report received from ANNIE Turner. Visualized patient and assessed patient's overall condition and appearance. No acute distress noted. Will continue to monitor

## 2020-08-04 NOTE — ASSESSMENT & PLAN NOTE
64 y/o man with recent admission for L cerebellar stroke, discharged today on DAPT, who returned to ED for aphasia, R hemiparesis, which has since improved.  Concerned for L-MCA occlusion, or new thalamic infarct.  Recommend CTA head/neck and transfer to [facility] for further workup (consider GABRIEL, malignancy workup given number of strokes in last few months).

## 2020-08-06 ENCOUNTER — OFFICE VISIT (OUTPATIENT)
Dept: ENDOCRINOLOGY | Facility: CLINIC | Age: 65
End: 2020-08-06
Payer: MEDICARE

## 2020-08-06 VITALS
HEIGHT: 68 IN | BODY MASS INDEX: 38.35 KG/M2 | DIASTOLIC BLOOD PRESSURE: 68 MMHG | WEIGHT: 253.06 LBS | RESPIRATION RATE: 18 BRPM | SYSTOLIC BLOOD PRESSURE: 130 MMHG | HEART RATE: 68 BPM

## 2020-08-06 DIAGNOSIS — I10 ESSENTIAL (PRIMARY) HYPERTENSION: ICD-10-CM

## 2020-08-06 DIAGNOSIS — Z79.4 TYPE 2 DIABETES MELLITUS WITH HYPERGLYCEMIA, WITH LONG-TERM CURRENT USE OF INSULIN: Primary | ICD-10-CM

## 2020-08-06 DIAGNOSIS — E11.65 TYPE 2 DIABETES MELLITUS WITH HYPERGLYCEMIA, WITH LONG-TERM CURRENT USE OF INSULIN: Primary | ICD-10-CM

## 2020-08-06 DIAGNOSIS — E66.9 OBESITY, UNSPECIFIED CLASSIFICATION, UNSPECIFIED OBESITY TYPE, UNSPECIFIED WHETHER SERIOUS COMORBIDITY PRESENT: ICD-10-CM

## 2020-08-06 DIAGNOSIS — E78.5 HYPERLIPIDEMIA, UNSPECIFIED HYPERLIPIDEMIA TYPE: ICD-10-CM

## 2020-08-06 DIAGNOSIS — I25.10 CORONARY ARTERY DISEASE, ANGINA PRESENCE UNSPECIFIED, UNSPECIFIED VESSEL OR LESION TYPE, UNSPECIFIED WHETHER NATIVE OR TRANSPLANTED HEART: ICD-10-CM

## 2020-08-06 DIAGNOSIS — I63.9 CEREBROVASCULAR ACCIDENT (CVA), UNSPECIFIED MECHANISM: ICD-10-CM

## 2020-08-06 DIAGNOSIS — E03.9 HYPOTHYROIDISM, UNSPECIFIED TYPE: ICD-10-CM

## 2020-08-06 DIAGNOSIS — E03.8 OTHER SPECIFIED HYPOTHYROIDISM: ICD-10-CM

## 2020-08-06 LAB — POCT GLUCOSE: 92 MG/DL (ref 70–110)

## 2020-08-06 PROCEDURE — 99204 OFFICE O/P NEW MOD 45 MIN: CPT | Mod: S$PBB,,, | Performed by: INTERNAL MEDICINE

## 2020-08-06 PROCEDURE — 99999 PR PBB SHADOW E&M-EST. PATIENT-LVL V: CPT | Mod: PBBFAC,,, | Performed by: INTERNAL MEDICINE

## 2020-08-06 PROCEDURE — 99204 PR OFFICE/OUTPT VISIT, NEW, LEVL IV, 45-59 MIN: ICD-10-PCS | Mod: S$PBB,,, | Performed by: INTERNAL MEDICINE

## 2020-08-06 PROCEDURE — 99999 PR PBB SHADOW E&M-EST. PATIENT-LVL V: ICD-10-PCS | Mod: PBBFAC,,, | Performed by: INTERNAL MEDICINE

## 2020-08-06 PROCEDURE — 99215 OFFICE O/P EST HI 40 MIN: CPT | Mod: PBBFAC | Performed by: INTERNAL MEDICINE

## 2020-08-06 RX ORDER — METFORMIN HYDROCHLORIDE 1000 MG/1
1000 TABLET ORAL 2 TIMES DAILY WITH MEALS
Qty: 180 TABLET | Refills: 3 | Status: SHIPPED | OUTPATIENT
Start: 2020-08-06 | End: 2021-11-30

## 2020-08-06 RX ORDER — DULAGLUTIDE 0.75 MG/.5ML
0.75 INJECTION, SOLUTION SUBCUTANEOUS WEEKLY
Qty: 4 PEN | Refills: 5 | Status: SHIPPED | OUTPATIENT
Start: 2020-08-06 | End: 2020-12-31

## 2020-08-06 RX ORDER — LEVOTHYROXINE SODIUM 200 UG/1
200 TABLET ORAL
Qty: 30 TABLET | Refills: 11 | Status: CANCELLED | OUTPATIENT
Start: 2020-08-06 | End: 2021-08-06

## 2020-08-06 NOTE — PROGRESS NOTES
Lorena Rodriguez is a 65 y.o. male with CAD, CVA, HTN, HLD referred by [facility] for evaluation of T2DM    History of Present Illness  T2DM  Diagnosed around 2015  Known complications: CAD, CVA    Over last few weeks he has had multiple TIAs and presents today with concern that sugar may be contributing.  He has checked glucose during some episodes and it has been in the 200-300's. Will take extra doses of toujeo when it is high    Current Diabetes Regimen:  Metformin 500 mg BID  Toujeo 40 units daily    Prior mediations tried:  none    Diet/Exercise:  Eggs and sausage  Chicken stirfry   1 piece bread daily    No exercise    Recent Hgb A1C:  Lab Results   Component Value Date    HGBA1C 6.7 (H) 08/03/2020       Glucose Monitoring:              Hypoglycemic Episodes:none    Screening / DM Complications:    Nephropathy:  ACEi/ARB: Taking  No results found for: MICALBCREAT    Last Lipid Panel:  Statin: Taking  Lab Results   Component Value Date    LDLCALC 64.6 08/04/2020       Last foot exam Most Recent Foot Exam Date: Not Found  Last eye exam: outside exam in last year; denies DR    B12:  No results found for: [id number]    Hypothyroidism  Diagnosed 2010  Unsure of current dose of LT4. He thinks he is taking 150 + 50 mcg but unsure  Takes appropriately without food first thing in the morning    Weight: denies  Fatigue: denies  Cold intolerance: denies  Bowel movements: denies constipation  Tremor: denies  Palpitations: denies      Denied neck enlargement, hoarseness, dysphagia.              Current Outpatient Medications:     amLODIPine (NORVASC) 10 MG tablet, Take 10 mg by mouth once daily., Disp: , Rfl:     aspirin 325 MG tablet, Take 325 mg by mouth., Disp: , Rfl:     atorvastatin (LIPITOR) 40 MG tablet, Take 1 tablet (40 mg total) by mouth once daily., Disp: 90 tablet, Rfl: 3    clopidogreL (PLAVIX) 75 mg tablet, Take 75 mg by mouth once daily., Disp: , Rfl:     metoprolol succinate (TOPROL-XL) 50 MG 24 hr  tablet, Take 50 mg by mouth once daily., Disp: , Rfl:     NITROGLYCERIN SL, Place 0.4 mg under the tongue daily as needed., Disp: , Rfl:     olmesartan-hydrochlorothiazide (BENICAR HCT) 20-12.5 mg per tablet, Take 1 tablet by mouth once daily., Disp: , Rfl:     co-enzyme Q-10 30 mg capsule, Take 30 mg by mouth once daily., Disp: , Rfl:     dulaglutide (TRULICITY) 0.75 mg/0.5 mL pen injector, Inject 0.75 mg into the skin once a week., Disp: 4 pen, Rfl: 5    metFORMIN (GLUCOPHAGE) 1000 MG tablet, Take 1 tablet (1,000 mg total) by mouth 2 (two) times daily with meals., Disp: 180 tablet, Rfl: 3    Review of Systems   Constitutional: Negative for fever.   Eyes: Negative for pain.   Respiratory: Negative for shortness of breath.    Cardiovascular: Negative for chest pain and leg swelling.   Gastrointestinal: Negative for abdominal pain.   Genitourinary: Negative for dysuria.   Musculoskeletal: Negative for arthralgias.   Skin: Negative for rash.   Neurological: Negative for headaches.   Psychiatric/Behavioral: Negative for confusion.       Objective:     Vitals:    08/06/20 0829   BP: 130/68   Pulse: 68   Resp: 18     Wt Readings from Last 3 Encounters:   08/06/20 114.8 kg (253 lb 1.4 oz)   08/04/20 115.2 kg (254 lb)   08/04/20 115.4 kg (254 lb 6.6 oz)     Body mass index is 38.48 kg/m².  Physical Exam  Constitutional:       Appearance: He is well-developed.   HENT:      Head: Normocephalic.      Right Ear: External ear normal.      Left Ear: External ear normal.   Eyes:      Conjunctiva/sclera: Conjunctivae normal.      Pupils: Pupils are equal, round, and reactive to light.   Neck:      Thyroid: No thyromegaly.      Trachea: No tracheal deviation.   Cardiovascular:      Rate and Rhythm: Normal rate and regular rhythm.      Heart sounds: No murmur.      Comments: No LE edema  Pulmonary:      Effort: Pulmonary effort is normal.      Breath sounds: Normal breath sounds.   Abdominal:      General: There is no  distension.      Palpations: Abdomen is soft. There is no mass.      Tenderness: There is no abdominal tenderness.      Hernia: No hernia is present.   Skin:     General: Skin is warm.      Findings: No rash.      Comments: No nodules   Neurological:      Mental Status: He is alert and oriented to person, place, and time.   Psychiatric:         Judgment: Judgment normal.         Labs    Chemistry        Component Value Date/Time     08/04/2020 1520    K 3.9 08/04/2020 1520     08/04/2020 1520    CO2 28 08/04/2020 1520    BUN 22 08/04/2020 1520    CREATININE 1.3 08/04/2020 1520     (H) 08/04/2020 1520        Component Value Date/Time    CALCIUM 9.7 08/04/2020 1520    ALKPHOS 65 08/04/2020 1520    AST 20 08/04/2020 1520    ALT 33 08/04/2020 1520    BILITOT 0.3 08/04/2020 1520    ESTGFRAFRICA >60 08/04/2020 1520    EGFRNONAA 56 (A) 08/04/2020 1520        Lab Results   Component Value Date    TSH 0.027 (L) 08/04/2020           Assessment and Plan     Type 2 diabetes mellitus with hyperglycemia, with long-term current use of insulin  Given recent CVA and known CAD would benefit from addition of medication with known CVD benefit  Will make the following changes:  Increase metformin to 1000 mg twice a day  Start taking Trulicity 0.75 mg once a week  Decrease Toujeo to 20 units at night    Check sugar 2-3 times a day and bring log to your next visit    I will refer you to diabetes education for a visit    Consider addition of SGLT2 at next visit    Stroke  Glycemic control as above  Discussed checking glucose at time of stroke symptom to ensure not hypoglycemic although seems he has had hyperglycemia with previous episodes  Discussed that DM increases risk of CVA but I do not think hyperglycemia is cause of recent episodes as glucose overall well controlled    CAD (coronary artery disease)  As above    Obesity  Start GLP1 as above  Discussed dietary modification, refer to DM education    Essential  (primary) hypertension  BP at goal today    Hyperlipemia  Cont statin    Other specified hypothyroidism  Will call to give me current dose  TSH suppressed, will decrease and repeat TSH 6 weeks        RTC 4 weeks with NP    Maritza Perez MD

## 2020-08-06 NOTE — ASSESSMENT & PLAN NOTE
Given recent CVA and known CAD would benefit from addition of medication with known CVD benefit  Will make the following changes:  Increase metformin to 1000 mg twice a day  Start taking Trulicity 0.75 mg once a week  Decrease Toujeo to 20 units at night    Check sugar 2-3 times a day and bring log to your next visit    I will refer you to diabetes education for a visit    Consider addition of SGLT2 at next visit

## 2020-08-06 NOTE — TELEPHONE ENCOUNTER
----- Message from Dia Sousa sent at 8/6/2020 10:18 AM CDT -----  Levothyroxine  200 mcg and 50 mcg           Contact Info [phone] (home)

## 2020-08-06 NOTE — PATIENT INSTRUCTIONS
Increase metformin to 1000 mg twice a day  Start taking Trulicity 0.75 mg once a week  Decrease Toujeo to 20 units at night    Check sugar 2-3 times a day and bring log to your next visit    I will refer you to diabetes education for a visit    When you get home please call and let us know your dose of levothyroxine so we can reduce it    We will check labs again in 6 weeks

## 2020-08-06 NOTE — ASSESSMENT & PLAN NOTE
Glycemic control as above  Discussed checking glucose at time of stroke symptom to ensure not hypoglycemic although seems he has had hyperglycemia with previous episodes  Discussed that DM increases risk of CVA but I do not think hyperglycemia is cause of recent episodes as glucose overall well controlled

## 2020-08-14 ENCOUNTER — TELEPHONE (OUTPATIENT)
Dept: NEUROLOGY | Facility: CLINIC | Age: 65
End: 2020-08-14

## 2020-08-14 NOTE — TELEPHONE ENCOUNTER
----- Message from Jackie Vivar sent at 8/14/2020 10:53 AM CDT -----  Regarding: pt consult  Contact: Dr. Soliman @ [phone]  Dr. Soliman calling to do a pt consult for the patient due to have multiple strokes.

## 2020-08-25 ENCOUNTER — HOME CARE VISIT (OUTPATIENT)
Dept: NEUROLOGY | Facility: HOSPITAL | Age: 65
End: 2020-08-25

## 2020-08-25 NOTE — PROGRESS NOTES
Phone visit completed with patient, caregiver present. Patient had ER visits with hospital stays for reoccurring strokes. Will follow up with eye doctor and neurologist in the next week. Patient reports CBG ranges 103-156 and BP sys 130-142 dys 90-77. He also reports continued visual disturbances. Discussed with patient possible OT therapy for residual visual effects of stroke. Reinforced education on management of stroke RF to include HTN and DM.

## 2020-08-31 ENCOUNTER — CLINICAL SUPPORT (OUTPATIENT)
Dept: DIABETES | Facility: CLINIC | Age: 65
End: 2020-08-31
Payer: MEDICARE

## 2020-08-31 DIAGNOSIS — E11.65 TYPE 2 DIABETES MELLITUS WITH HYPERGLYCEMIA, WITH LONG-TERM CURRENT USE OF INSULIN: ICD-10-CM

## 2020-08-31 DIAGNOSIS — Z79.4 TYPE 2 DIABETES MELLITUS WITH HYPERGLYCEMIA, WITH LONG-TERM CURRENT USE OF INSULIN: ICD-10-CM

## 2020-08-31 PROCEDURE — G0108 DIAB MANAGE TRN  PER INDIV: HCPCS | Mod: PBBFAC,PN | Performed by: DIETITIAN, REGISTERED

## 2020-08-31 NOTE — PROGRESS NOTES
Diabetes Education  Author: Amalia Staples RD  Date: 8/31/2020    Diabetes Care Management Summary  Discussed diabetes self management w emphasis on CHO awareness/counting, meal planning/label reading, SMBG, exercise, and meds. Discussed lowering saturated fats and sodium in meals  Diabetes Education Record Assessment/Progress: Initial  Current Diabetes Risk Level: Moderate     Last A1c:   Lab Results   Component Value Date    HGBA1C 6.7 (H) 08/03/2020     Last visit with Diabetes Educator: : 08/31/2020      Diabetes Type  Diabetes Type : Type II    Diabetes History  Diabetes Diagnosis: 5-10 years  Current Treatment: Oral Medication, Diet, Injectable, Insulin  Reviewed Problem List with Patient: Yes    Health Maintenance was reviewed today with patient. Discussed with patient importance of routine eye exams, foot exams/foot care, blood work (i.e.: A1c, microalbumin, and lipid), dental visits, yearly flu vaccine, and pneumonia vaccine as indicated by PCP. Patient verbalized understanding.     Health Maintenance Topics with due status: Not Due       Topic Last Completion Date    Hemoglobin A1c 08/03/2020    Lipid Panel 08/04/2020    High Dose Statin 08/06/2020    Aspirin/Antiplatelet Therapy 08/06/2020    Influenza Vaccine Not Due     Health Maintenance Due   Topic Date Due    Hepatitis C Screening  1955    Foot Exam  01/01/1963    Eye Exam  01/01/1963    TETANUS VACCINE  01/01/1971    Shingles Vaccine (1 of 2) 01/01/2003    Colorectal Cancer Screening  01/01/2003    Pneumococcal Vaccine (65+ Low/Medium Risk) (1 of 2 - PCV13) 01/01/2018    Abdominal Aortic Aneurysm Screening  01/01/2018       Nutrition  Meal Planning: water, eats out seldom, 3 meals per day, snacks between meal  What type of beverages do you drink?: water, milk  Meal Plan 24 Hour Recall - Breakfast: 2 eggs, 2-3 sausage links, 1 sl toast plain, coffee w truvia (in past drank 2-3 POTS of coffee each day)  Meal Plan 24 Hour Recall -  Lunch: tuna salad w crackers or on carina;  or deli meat and american cheese on 2 sl bread or 1 carina, coffee  Meal Plan 24 Hour Recall - Dinner: chicken stir-graham w 1/2 c ready to eat brown rice, 2 cups milk  Meal Plan 24 Hour Recall - Snack: yougrt w 1/2c frozen fruit or nuts or cheese and fruit, pickles, olives    Monitoring   Monitoring: Other  Self Monitoring : checks BG 2x/day fasting and alternating  Blood Glucose Logs: Yes(fasting , 2 hr less than 180)  Do you use a personal continuous glucose monitor?: No  In the last month, how often have you had a low blood sugar reaction?: never  Can you tell when your blood sugar is too high?: no    Exercise   Exercise Type: none(recovering from CVA)    Current Diabetes Treatment   Current Treatment: Oral Medication, Diet, Injectable, Insulin    Social History  Preferred Learning Method: Face to Face  Primary Support: Self, Spouse  Educational Level: High School  Occupation: retired  Smoking Status: Ex Smoker  Alcohol Use: Never  Barriers to Change  Barriers to Change: None  Learning Challenges : None  Readiness to Learn   Readiness to Learn : Eager  Cultural Influences  Cultural Influences: No    Diabetes Education Assessment/Progress  Diabetes Disease Process (diabetes disease process and treatment options): Discussion, Written Materials Provided, Individual Session, Demonstrates Understanding/Competency(verbalizes/demonstrates)  Nutrition (Incorporating nutritional management into one's lifestyle): Discussion, Individual Session, Demonstrates Understanding/Competency (verbalizes/demonstrates), Instructed  -diet recall indicates pt attempting to control carbs in starches but still over consumes in fruit and milk products. Diet recall notes high Na intake in condiments, deli meats, processed cheese and ready-to-eat sides  -pt states he has reduced coffee intake from 2-3 pots/day to just 2-3 cups /day  Discussed carb vs non-carb foods, appropriate amount of carbs to  have at meals/snacks and acceptiable serving sizes of individual carb items.  Reviewed need to limit total/saturated fats and sodium; tips guide provided  - Instructed on appropriate label reading and serving sizes of specific carb containing foods., portion control (hand) and using the plate method of meal planning.   -Encouraged decreasing portion sizes of CHO to 3-4 servings (45-60g) per meal, 3 evenly-spaced meals daily and limiting snacks to mostly 0-5g CHO. If does have CHO snack occasionally, no more than 15g    Physical Activity (incorporating physical activity into one's lifestyle): Written Materials Provided, Discussion, Individual Session, Comprehends Key Points  Medications (states correct name, dose, onset, peak, duration, side effects & timing of meds): Written Materials Provided, Discussion, Individual Session, Demonstrates Understanding/Competency(verbalizes/demonstrates), Instructed  - Reviewed medication changes; pt states all going well and takes meds as directed  Increase metformin to 1000 mg twice a day  Start taking Trulicity 0.75 mg once a week  Decrease Toujeo to 20 units at night    Monitoring (monitoring blood glucose/other parameters & using results): Written Materials Provided, Discussion, Individual Session, Demonstrates Understanding/Competency (verbalizes/demonstrates), Instructed  -pt keeps BG and BP log and brings to clinic appt for review  --Educated regarding purpose of monitoring blood sugar. Discussed goal Blood sugars for different times of day and in relation to meals.  -Reviewed A1c goal of 7% or less and BGgoals of  pre meal/fasting, <180 2hrpp.   Discussed BG readings and how to use data in DM self management; rec'd continue SMBG 2-3x daily, fasting and alternating times.     Acute Complications (preventing, detecting, and treating acute complications): Written Materials Provided, Discussion, Individual Session, Demonstrates Understanding/Competency  "(verbalizes/demonstrates)  -Reviewed s/s, causes, and treatment of hyperglycemia and hypoglycemia and use of  "rule of 15" w/ hypoglycemia.    Chronic Complications (preventing, detecting, and treating chronic complications): Written Materials Provided, Discussion, Individual Session, Demonstrates Understanding/Competency (verbalizes/demonstrates)  Clinical (diabetes, other pertinent medical history, and relevant comorbidities reviewed during visit): Written Materials Provided, Individual Session, Discussion  Cognitive (knowledge of self-management skills, functional health literacy): Discussion, Individual Session, Demonstrates Understanding/Competency (verbalizes/demonstrates)  -Arrives with adequate health management knowledge - poor specific knowledge of diabetes management. Leaves with increased knowledge base     Psychosocial (emotional response to diabetes): Discussion  Diabetes Distress and Support Systems: Discussion, Comprehends Key Points  Behavioral (readiness for change, lifestyle practices, self-care behaviors): Discussion, Individual Session  -Pt indicated importance of changing present behaviors to allow for improved health long term and pt appears  motivated to make recommended changes    Goals  Patient has selected/evaluated goals during today's session: Yes, selected  Healthy Eating: Set(carb count and distribute carbs into 3 meals/day, 45-60 g carb/meal)  Start Date: 08/31/20  Target Date: 09/14/20    Diabetes Care Plan/Intervention  Education Plan/Intervention: Individual Follow-Up DSMT    Diabetes Meal Plan  Restrictions: Low Sodium, Restricted Carbohydrate, Low Fat  Carbohydrate Per Meal: 45-60g  Carbohydrate Per Snack : 15-20g    Today's Self-Management Care Plan was developed with the patient's input and is based on barriers identified during today's assessment.  The long and short-term goals in the care plan were written with the patient/caregiver's input. The patient has agreed to work " toward these goals to improve his overall diabetes control.    The patient received a copy of today's self-management plan and verbalized understanding of the care plan, goals, and all of today's instructions.    The patient was encouraged to communicate with his physician and care team regarding his condition(s) and treatment.  I provided the patient with my contact information today and encouraged him to contact me via phone or patient portal as needed.     Education Units of Time   Time Spent: 60 min

## 2020-09-03 ENCOUNTER — OFFICE VISIT (OUTPATIENT)
Dept: NEUROLOGY | Facility: CLINIC | Age: 65
End: 2020-09-03
Payer: MEDICARE

## 2020-09-03 VITALS
DIASTOLIC BLOOD PRESSURE: 71 MMHG | HEART RATE: 66 BPM | WEIGHT: 250.44 LBS | SYSTOLIC BLOOD PRESSURE: 125 MMHG | BODY MASS INDEX: 37.96 KG/M2 | HEIGHT: 68 IN

## 2020-09-03 DIAGNOSIS — H53.469 HOMONYMOUS HEMIANOPSIA FOLLOWING CEREBROVASCULAR ACCIDENT: ICD-10-CM

## 2020-09-03 DIAGNOSIS — Z86.73 HISTORY OF ISCHEMIC MULTIFOCAL MULTIPLE VASCULAR TERRITORIES STROKE: Primary | ICD-10-CM

## 2020-09-03 DIAGNOSIS — I69.398 HOMONYMOUS HEMIANOPSIA FOLLOWING CEREBROVASCULAR ACCIDENT: ICD-10-CM

## 2020-09-03 DIAGNOSIS — I63.512 ACUTE ISCHEMIC LEFT MCA STROKE: ICD-10-CM

## 2020-09-03 DIAGNOSIS — Z79.4 TYPE 2 DIABETES MELLITUS WITH HYPERGLYCEMIA, WITH LONG-TERM CURRENT USE OF INSULIN: ICD-10-CM

## 2020-09-03 DIAGNOSIS — I10 ESSENTIAL (PRIMARY) HYPERTENSION: ICD-10-CM

## 2020-09-03 DIAGNOSIS — H53.462 HEMIANOPIA OF LEFT EYE: ICD-10-CM

## 2020-09-03 DIAGNOSIS — E78.5 HYPERLIPIDEMIA, UNSPECIFIED HYPERLIPIDEMIA TYPE: ICD-10-CM

## 2020-09-03 DIAGNOSIS — Z86.73 CHRONIC ISCHEMIC LEFT POSTERIOR CEREBRAL ARTERY (PCA) STROKE: ICD-10-CM

## 2020-09-03 DIAGNOSIS — I63.541 CEREBRAL INFARCTION INVOLVING RIGHT CEREBELLAR ARTERY: ICD-10-CM

## 2020-09-03 DIAGNOSIS — I63.542 CEREBRAL INFARCTION INVOLVING LEFT CEREBELLAR ARTERY: ICD-10-CM

## 2020-09-03 DIAGNOSIS — E11.65 TYPE 2 DIABETES MELLITUS WITH HYPERGLYCEMIA, WITH LONG-TERM CURRENT USE OF INSULIN: ICD-10-CM

## 2020-09-03 DIAGNOSIS — I63.441 EMBOLIC STROKE INVOLVING RIGHT CEREBELLAR ARTERY: ICD-10-CM

## 2020-09-03 DIAGNOSIS — E03.8 OTHER SPECIFIED HYPOTHYROIDISM: ICD-10-CM

## 2020-09-03 DIAGNOSIS — I25.10 CORONARY ARTERY DISEASE, ANGINA PRESENCE UNSPECIFIED, UNSPECIFIED VESSEL OR LESION TYPE, UNSPECIFIED WHETHER NATIVE OR TRANSPLANTED HEART: ICD-10-CM

## 2020-09-03 DIAGNOSIS — Z86.73 CHRONIC ISCHEMIC RIGHT PCA STROKE: ICD-10-CM

## 2020-09-03 DIAGNOSIS — I74.8 EMBOLISM AND THROMBOSIS OF OTHER ARTERIES: ICD-10-CM

## 2020-09-03 PROCEDURE — 99999 PR PBB SHADOW E&M-EST. PATIENT-LVL IV: ICD-10-PCS | Mod: PBBFAC,GC,, | Performed by: STUDENT IN AN ORGANIZED HEALTH CARE EDUCATION/TRAINING PROGRAM

## 2020-09-03 PROCEDURE — 99215 OFFICE O/P EST HI 40 MIN: CPT | Mod: S$PBB,GC,, | Performed by: STUDENT IN AN ORGANIZED HEALTH CARE EDUCATION/TRAINING PROGRAM

## 2020-09-03 PROCEDURE — 99215 PR OFFICE/OUTPT VISIT, EST, LEVL V, 40-54 MIN: ICD-10-PCS | Mod: S$PBB,GC,, | Performed by: STUDENT IN AN ORGANIZED HEALTH CARE EDUCATION/TRAINING PROGRAM

## 2020-09-03 PROCEDURE — 99999 PR PBB SHADOW E&M-EST. PATIENT-LVL IV: CPT | Mod: PBBFAC,GC,, | Performed by: STUDENT IN AN ORGANIZED HEALTH CARE EDUCATION/TRAINING PROGRAM

## 2020-09-03 PROCEDURE — 99214 OFFICE O/P EST MOD 30 MIN: CPT | Mod: PBBFAC | Performed by: STUDENT IN AN ORGANIZED HEALTH CARE EDUCATION/TRAINING PROGRAM

## 2020-09-03 RX ORDER — LEVOTHYROXINE SODIUM 50 UG/1
0.05 TABLET ORAL
COMMUNITY
Start: 2020-07-30 | End: 2020-09-21

## 2020-09-04 PROBLEM — Z86.73: Status: ACTIVE | Noted: 2020-09-04

## 2020-09-04 PROBLEM — Z86.73 CHRONIC ISCHEMIC RIGHT PCA STROKE: Status: ACTIVE | Noted: 2020-09-04

## 2020-09-04 PROBLEM — I74.8 EMBOLISM AND THROMBOSIS OF OTHER ARTERIES: Status: ACTIVE | Noted: 2020-09-04

## 2020-09-04 NOTE — PROGRESS NOTES
"  Neurology Clinic  Follow-up Visit    Patient Name: Lorena Rodriguez  MRN: [id number]    CC: Stroke follow up    Interval History 9/4/2020: Since last assessment, patient reports "multiple stroke".   - 7/30/20 - Unknown symptoms, presented to [facility] where CT Head showed no acute intracranial abnormalities. Patient states he left AMA prior to any additional work up.   - 7/31/20 - Increased slurred speech noted by family, spontaneously resolved and refused to seek medical care  - 8/2/20 - difficult to arouse and then noted L facial droop and dysarthria, represented to [facility] where head imaging showed acute infarct in L cerebellar hemisphere with some hemorrhagic conversion. Patient was admitted, TTE showed moderate L atrial enlargement. Patient discharged per request 8/3 after resolution of symptoms. Post discharge, patient "felt funny" and then had a witnessed near-syncopal episode. He was brought back to the hospital on 8/4 where a CTH showed an evolving acute hemorrhagic infarction in the left cerebellum and prior infarcts in the right cerebellum and right occipital lobe. Patient again states that he left after symptoms resolved.     In clinic today, we discussed the importance of a full work up after repeated strokes in multiple vascular territories including malignancy and hypercoagulable labs. We discussed that he needs to seek help for any new neurologic symptoms. Patient and wife are in agreement to seek additional help as needed. Patient to obtain results on cardiac event monitor given repeated strokes and dilated L atrium on echo from [facility] cardiologist.     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 Patient states that he developed a sudden left sided vision loss on May 26, 2020 for which he presented to his " ophthalmologist who recommended that the patient go immediately to the emergency room for further evaluation and stroke workup.  Patient presented to [facility] on 05/29 where a CT showed a subacute parasagittal occipital right infarct for which he was admitted.  Patient states that his symptoms at that time were a left lateral hemianopia, left facial droop, dysarthria, and transient right lower extremity weakness.  Patient states that he did not have an MRI and left AMA after not seeing a neurologist for several days.  Patient presented to [facility] on 06/07 for further evaluation where an MRI showed a moderate-sized signal abnormality right superior cerebellum most compatible with recent infarction as well as signal abnormality right parasagittal occipital lobe with susceptibility and T1 shortening along the cortex and underlying vasogenic edema concerning for subacute age cortical hemorrhage and vasogenic edema.    Patient was admitted to Vascular Neurology where a full stroke work up was completed. MRA Head and Neck did not show clinically significant stenosis and an echo was normal. Patient was discharged on 6/9 on ASA, Plavix and Atorvastatin. Patient states that he continues to have a left hemianopsia and slight dysarthria which is confirmed by his wife. Patient is seeing cardiology at [facility] where his evaluation is ongoing .    Review of Systems   Constitutional: Negative for malaise/fatigue and weight loss.   HENT: Negative for hearing loss.    Eyes: Positive for blurred vision. Negative for photophobia.   Respiratory: Negative for shortness of breath.    Cardiovascular: Negative for chest pain.   Gastrointestinal: Negative for nausea and vomiting.   Musculoskeletal: Negative for back pain and neck pain.   Neurological: Positive for speech change. Negative for dizziness, tingling, tremors, sensory change, focal weakness, seizures, loss of consciousness, weakness and headaches.   Psychiatric/Behavioral: Negative  for hallucinations and memory loss. The patient does not have insomnia.        Past Medical History  Past Medical History:   Diagnosis Date    Coronary artery disease     Diabetes mellitus     pre-diabetes    Hypertension     Stroke     multiple tia's       Medications    Current Outpatient Medications:     amLODIPine (NORVASC) 10 MG tablet, Take 10 mg by mouth once daily., Disp: , Rfl:     aspirin 325 MG tablet, Take 325 mg by mouth., Disp: , Rfl:     atorvastatin (LIPITOR) 40 MG tablet, Take 1 tablet (40 mg total) by mouth once daily., Disp: 90 tablet, Rfl: 3    clopidogreL (PLAVIX) 75 mg tablet, Take 75 mg by mouth once daily., Disp: , Rfl:     dulaglutide (TRULICITY) 0.75 mg/0.5 mL pen injector, Inject 0.75 mg into the skin once a week., Disp: 4 pen, Rfl: 5    levothyroxine (SYNTHROID) 50 MCG tablet, 0.05 mcg., Disp: , Rfl:     metFORMIN (GLUCOPHAGE) 1000 MG tablet, Take 1 tablet (1,000 mg total) by mouth 2 (two) times daily with meals., Disp: 180 tablet, Rfl: 3    metoprolol succinate (TOPROL-XL) 50 MG 24 hr tablet, Take 50 mg by mouth once daily., Disp: , Rfl:     NITROGLYCERIN SL, Place 0.4 mg under the tongue daily as needed., Disp: , Rfl:     olmesartan-hydrochlorothiazide (BENICAR HCT) 20-12.5 mg per tablet, Take 1 tablet by mouth once daily., Disp: , Rfl:   Any other notable medications as documented in HPI    Allergies  Review of patient's allergies indicates:  No Known Allergies    Social History  Social History     Socioeconomic History    Marital status:      Spouse name: Not on file    Number of children: Not on file    Years of education: Not on file    Highest education level: Not on file   Occupational History    Not on file   Social Needs    Financial resource strain: Not on file    Food insecurity     Worry: Not on file     Inability: Not on file    Transportation needs     Medical: Not on file     Non-medical: Not on file   Tobacco Use    Smoking status: Former  "Smoker     Types: Cigarettes, Cigars    Smokeless tobacco: Former User   Substance and Sexual Activity    Alcohol use: Never     Frequency: Never    Drug use: Never    Sexual activity: Not on file   Lifestyle    Physical activity     Days per week: Not on file     Minutes per session: Not on file    Stress: Not on file   Relationships    Social connections     Talks on phone: Not on file     Gets together: Not on file     Attends Taoist service: Not on file     Active member of club or organization: Not on file     Attends meetings of clubs or organizations: Not on file     Relationship status: Not on file   Other Topics Concern    Not on file   Social History Narrative    Not on file     Any other notable Social History as documented in HPI.    Family History  No family history on file.  Any other notable FMH as documented in HPI.    Physical Exam  /71   Pulse 66   Ht 5' 8" (1.727 m)   Wt 113.6 kg (250 lb 7.1 oz)   BMI 38.08 kg/m²     Physical Exam  Vitals signs reviewed.   Constitutional:       Appearance: Normal appearance.   HENT:      Head: Normocephalic and atraumatic.      Nose: Nose normal.   Eyes:      Extraocular Movements: Extraocular movements intact.      Pupils: Pupils are equal, round, and reactive to light.      Comments: L lateral heminopsia   Neck:      Musculoskeletal: Normal range of motion.   Pulmonary:      Effort: Pulmonary effort is normal. No respiratory distress.   Skin:     General: Skin is warm and dry.   Neurological:      Mental Status: He is alert and oriented to person, place, and time.      Sensory: No sensory deficit.      Motor: No weakness.      Coordination: Coordination normal.      Gait: Gait normal.      Deep Tendon Reflexes: Reflexes normal.         Neurologic Exam: The patient is awake, alert and oriented. Language is fluent.  Fund of knowledge is appropriate.     Cranial nerves:   Pupils are round and reactive to light and accommodation.   Visual field " testing shows a left hemianopsia  Ocular motility is full in all cardinal positions of gaze.   Facial sensation is normal to light touch.   Facial activation is symmetric.   Hearing is symmetric bilaterally.   Palate elevates symmetrically.   Shoulder elevation is symmetric and full strength bilaterally.   Tongue is midline and neck rotation strength is normal bilaterally.     Motor examination of all extremities demonstrates normal bulk and tone in all four limbs. There are no atrophy or fasciculations. Strength is 5/5 in the upper and lower extremities bilaterally.    Sensory examination   Sensation to light touch: intact in BUE and BLE  Sensation to temperature: intact in BUE and BLE  Sensation to vibration: intact in BUE and BLE  Sensation to pinprick: intact in BUE and BLE  Proprioception: intact in BUE and BLE    Deep tendon reflexes are 2+ and symmetric in the upper and lower extremities bilaterally.  No clonus, downgoing toes b/l.    Gait: Normal tandem, and casual gait.    Coordination: Finger to nose and heel to shin testing is normal in both upper and lower extremities.    Lab and Test Results  All prior available lab work has been personally reviewed and interpreted by myself.  Relevant studies as listed below:    Echocardiogram wnl    Images:  All prior available relevant imaging has been personally reviewed and interpreted by myself.  Relevant imaging and radiology reports are summarized as below.      CT Head WO 8/4/2020  Evolving acute hemorrhagic infarction in the left cerebellum.  No new hemorrhage.  Old infarction in the right cerebellum and right occipital lobe    MRI Brain WO 8/3/2020  1. Left cerebellar hemisphere acute infarction.  Internal gradient susceptibility is seen presumably representing small amount of internal hemorrhagic conversion.  Close monitoring and short-term CT follow-up are recommended.  2. Small acute right occipital lobe infarct.  3. Remote infarcts with encephalomalacia  involving the right cerebellar hemisphere and right occipital lobe.    CTA Head and Neck 8/3/2020  1. Left cerebellar hemisphere acute infarct.  No intracranial hemorrhage.  2. Left AICA not definitively seen and may be markedly hypoplastic or potentially occluded.  3. Elsewhere in the head and neck there is only mild atherosclerosis with less than 50% stenosis.  No aneurysm or dissection seen.  4. Right parasagittal occipital lobe and right cerebellar encephalomalacia consistent with remote infarcts.  5. Generalized cerebral atrophy and mild chronic small vessel ischemic change.    MRI Brain WO 6/6/2020  Moderate-sized signal abnormality right superior cerebellum most compatible with recent infarction.  No significant mass effect or hydrocephalus.     Moderate signal abnormality right parasagittal occipital lobe with susceptibility and T1 shortening along the cortex and underlying vasogenic edema concerning for subacute age cortical hemorrhage and vasogenic edema, however underlying hemorrhagic mass cannot be excluded.  Clinical correlation and follow-up advised.  This could be further characterized with postcontrast imaging as well as CT.  Comparison with prior imaging may be of further diagnostic value.     Moderate patchy opacities in the paranasal sinuses.    MRA Brain and Neck WO 6/6/2020  MRA head: Unremarkable MRA of the head specifically without evidence for focal stenosis or proximal occlusion.     MRA neck: Irregularity of the carotid bifurcation and proximal ICAs although distorted by motion concerning for atherosclerotic disease with overall less than 50% proximal ICA stenosis by NASCET criteria.      Assessment and Plan    65M with multiple vascular risk factors as above presenting to clinic for evaluation and management of recurrent strokes in multiple vascular territories.   - Imaging with evidence of infarcts in bilateral cerebellar hemispheres and right occipital lobe  - Exam with L hemianopsia, no  balance disturbance despite cerebellar involvement   - TTE with evidence of L atrial enlargement   - on DAPT and statin  - Cardiology work up ongoing at [facility]  - Discussed with patient and his wife at length the importance of a complete workup for any evidence of malignancy or hypercoagulability.  - patient wife are in agreement to complete his workup within the oximeter system and to obtain outside records from [facility] cardiologist (30 day event monitor to assess for afib)  - Will order GABRIEL to rule out intracardiac clot  - Hypercoagulable labs as below  - CT C/A/P to rule out occult malignancy as reason for hypercoagulable state  - RTC in 6 weeks  - OSH records requested from Dr Pereira - Cardiologist ([phone]) and Dr Soliman - PCP ([phone])    Problem List Items Addressed This Visit        Neuro    Embolic stroke involving right cerebellar artery    Current Assessment & Plan     As above         Homonymous hemianopsia following cerebrovascular accident    Current Assessment & Plan     As above         Cerebral infarction involving right cerebellar artery    Current Assessment & Plan     As above         History of ischemic multifocal multiple vascular territories stroke - Primary    Current Assessment & Plan     As above         Relevant Orders    Sedimentation rate (Completed)    C-REACTIVE PROTEIN (Completed)    Transesophageal echo (GABRIEL)    D-DIMER, QUANTITATIVE (Completed)    Homocysteine, serum (Completed)    Cardiolipin antibody    Comprehensive metabolic panel (Completed)    LUPUS ANTICOAGULANT (DRVVT)    PROTIME-INR (Completed)    ANTITHROMBIN III    FACTOR 5 LEIDEN    CTA Chest Abdomen Pelvis    Cerebral infarction involving left cerebellar artery    Current Assessment & Plan     As above         Acute ischemic left MCA stroke    Current Assessment & Plan     As above         Chronic ischemic left posterior cerebral artery (PCA) stroke    Current Assessment & Plan      As above         Relevant Orders    Sedimentation rate (Completed)    C-REACTIVE PROTEIN (Completed)    Transesophageal echo (GABRIEL)    D-DIMER, QUANTITATIVE (Completed)    Homocysteine, serum (Completed)    Cardiolipin antibody    Comprehensive metabolic panel (Completed)    LUPUS ANTICOAGULANT (DRVVT)    PROTIME-INR (Completed)    ANTITHROMBIN III    FACTOR 5 LEIDEN    CTA Chest Abdomen Pelvis    Chronic ischemic right PCA stroke    Current Assessment & Plan     As above         Relevant Orders    Sedimentation rate (Completed)    C-REACTIVE PROTEIN (Completed)    Transesophageal echo (GABRIEL)    D-DIMER, QUANTITATIVE (Completed)    Homocysteine, serum (Completed)    Cardiolipin antibody    Comprehensive metabolic panel (Completed)    LUPUS ANTICOAGULANT (DRVVT)    PROTIME-INR (Completed)    ANTITHROMBIN III    FACTOR 5 LEIDEN    CTA Chest Abdomen Pelvis       Ophtho    Hemianopia of left eye    Current Assessment & Plan     As above            Cardiac/Vascular    Essential (primary) hypertension    Current Assessment & Plan     As above         Hyperlipemia    Current Assessment & Plan     As above         CAD (coronary artery disease)    Current Assessment & Plan     As above            Hematology    Embolism and thrombosis of other arteries     Current Assessment & Plan     As above         Relevant Orders    Transesophageal echo (GABRIEL)       Endocrine    Type 2 diabetes mellitus with hyperglycemia, with long-term current use of insulin    Overview     pre-diabetes         Current Assessment & Plan     As above         Other specified hypothyroidism    Current Assessment & Plan     As above                 Nj Milian MD  Neurology Resident - PGY3  [facility]  [address] [place] [address]

## 2020-09-10 ENCOUNTER — TELEPHONE (OUTPATIENT)
Dept: NEUROLOGY | Facility: CLINIC | Age: 65
End: 2020-09-10

## 2020-09-17 ENCOUNTER — LAB VISIT (OUTPATIENT)
Dept: LAB | Facility: HOSPITAL | Age: 65
End: 2020-09-17
Attending: INTERNAL MEDICINE
Payer: MEDICARE

## 2020-09-17 DIAGNOSIS — Z79.4 TYPE 2 DIABETES MELLITUS WITH HYPERGLYCEMIA, WITH LONG-TERM CURRENT USE OF INSULIN: ICD-10-CM

## 2020-09-17 DIAGNOSIS — E11.65 TYPE 2 DIABETES MELLITUS WITH HYPERGLYCEMIA, WITH LONG-TERM CURRENT USE OF INSULIN: ICD-10-CM

## 2020-09-17 DIAGNOSIS — E03.9 HYPOTHYROIDISM, UNSPECIFIED TYPE: ICD-10-CM

## 2020-09-17 LAB
T4 FREE SERPL-MCNC: 1.16 NG/DL (ref 0.71–1.51)
TSH SERPL DL<=0.005 MIU/L-ACNC: 0.06 UIU/ML (ref 0.4–4)

## 2020-09-17 PROCEDURE — 84443 ASSAY THYROID STIM HORMONE: CPT

## 2020-09-17 PROCEDURE — 84439 ASSAY OF FREE THYROXINE: CPT

## 2020-09-17 PROCEDURE — 36415 COLL VENOUS BLD VENIPUNCTURE: CPT | Mod: PN

## 2020-09-18 NOTE — PROGRESS NOTES
Subjective:      Patient ID: Lorena Rodriguez is a 65 y.o. male.    Chief Complaint:  Diabetes    History of Present Illness  Lorena Rodriguez with Type 2 diabetes complicated by CAD and CVA, HTN, HLD presents today for follow up of Type 2 Diabetes and hypothyroidism. Previous patient of Dr. Perez. Last seen in Aug 2020. This is his first visit with me.     We first met patient in Aug 2020 whereas he was having multiple TIAs/CVA he is here to discern whether blood sugar may be contributing. He has checked glucose during some episodes and it has been in the 200-300's.  Has seen neurology since his last visit and learned he had one large stroke over 5 episodes in 6 days. Residual slurred speech.    He is accompanied by his wife Jocelin today.     With regards to the diabetes:    Diagnosed with Type 2 DM around   Family History of Type 2 DM: none; parents  when he was young  Known complications:  DKA/HHS: denies  RN: left hemiopsia from stroke; up to date with eye exam  PN: right 1st toes   Nephropathy: denies  Gastroparesis: denies  CAD: stents in     Current regimen:  Metformin 500 mg twice a day-unable to tolerate-weak and tired   Trulicity 0.75 mg once a week  Toujeo 20 units at night  Fasting BG on lab of 83    Missed doses-denies    Other medications tried:  denies    2 # times a day testing  AM: 108-132   PM: 108-110  Log reviewed: none Oral recall  Hypoglycemic event-denies    Yes, did not need assistance   Knows how to correct with 15 grams of carbs- juice, coke, or a peppermint.     Dietary recall: He feels that he is following diabetic diet  Eats 3 meals a day.   B: eggs, cereal, pancakes-paleo  L; as below  D: cooked at home; chicken fish steak or shrimp with vegetable and sweet potato  Snacks: sometimes snacks instead of lunch-cheese, yogurt, grapes  Drinks: water, coffee with stevia, and milk    Exercise - tried to stay active. No formal exercise.      Education - last visit: 2020-[employer]    Has a  Medic alert tag-does not have    Diabetes Management Status  Statin: Taking  ACE/ARB: Taking    Lab Results   Component Value Date    HGBA1C 6.7 (H) 08/03/2020    HGBA1C 7.0 (H) 06/05/2020     Screening or Prevention Patient's value Goal Complete/Controlled?   HgA1C Testing and Control   Lab Results   Component Value Date    HGBA1C 6.7 (H) 08/03/2020      Annually/Less than 8% Yes   Lipid profile : 08/04/2020 Annually Yes   LDL control Lab Results   Component Value Date    LDLCALC 64.6 08/04/2020    Annually/Less than 100 mg/dl  Yes   Nephropathy screening No results found for: LABMICR  Lab Results   Component Value Date    PROTEINUA Negative 08/03/2020    Annually Yes   Blood pressure BP Readings from Last 1 Encounters:   09/21/20 120/71    Less than 140/90 Yes   Dilated retinal exam Most Recent Eye Exam Date: Not Found Annually No   Foot exam   : 09/21/2020 Annually No     With regards to hypothyroidism,   Family history of thyroid disease: unknown    Dx in 2010  Current medication:  Generic LT4 175 mcg daily (decreased from 175 +50 after his last visit) we looked at pill pictures to find out which pill he was on    Takes thyroid medication on an empty stomach and waits 30-45 minutes before eating drinking or taking other medications  Missed doses: denies    Denied neck enlargement, hoarseness, or dysphagia.    current symptoms:     [] weight gain  [] Fatigue  [] Constipation           [] Hair loss  [] Brittle nails   [] Mental fog [] Chest pain, palpations, or sob   []  Heat/cold intolerance  [x] Denies complaints     Ref. Range 9/17/2020 07:33   TSH Latest Ref Range: 0.400 - 4.000 uIU/mL 0.064 (L)   Free T4 Latest Ref Range: 0.71 - 1.51 ng/dL 1.16     Review of Systems   Constitutional: Negative for fatigue.   Eyes: Negative for visual disturbance.   Respiratory: Negative for shortness of breath.    Cardiovascular: Negative for chest pain.   Gastrointestinal: Negative for abdominal pain.   Musculoskeletal:  "Negative for arthralgias.   Skin: Negative for wound.   Neurological: Negative for headaches.   Hematological: Does not bruise/bleed easily.   Psychiatric/Behavioral: Negative for sleep disturbance.     Objective:   Physical Exam  Vitals signs reviewed.   Constitutional:       Appearance: He is well-developed.   Neck:      Thyroid: No thyromegaly.   Cardiovascular:      Rate and Rhythm: Normal rate.   Pulmonary:      Effort: Pulmonary effort is normal.   Abdominal:      Palpations: Abdomen is soft.     injection sites are without edema or erythema.  Lipo hypertropthy to LLQ-encouraged rotation of sites.   Diabetes Foot Exam:   Denies sores or lesions to bilateral feet  Shoes appropriate  sensation intact to vibration and monofilament    Visit Vitals  /71   Ht 5' 8" (1.727 m)   Wt 113.6 kg (250 lb 7.1 oz)   BMI 38.08 kg/m²        Lab Review:   Lab Results   Component Value Date    HGBA1C 6.7 (H) 08/03/2020    HGBA1C 7.0 (H) 06/05/2020      Lab Results   Component Value Date    CHOL 136 08/04/2020    HDL 42 08/04/2020    LDLCALC 64.6 08/04/2020    TRIG 147 08/04/2020    CHOLHDL 30.9 08/04/2020     Lab Results   Component Value Date     09/03/2020    K 3.4 (L) 09/03/2020     09/03/2020    CO2 26 09/03/2020    GLU 83 09/03/2020    BUN 18 09/03/2020    CREATININE 0.9 09/03/2020    CALCIUM 10.2 09/03/2020    PROT 8.1 09/03/2020    ALBUMIN 4.2 09/03/2020    BILITOT 0.3 09/03/2020    ALKPHOS 68 09/03/2020    AST 21 09/03/2020    ALT 40 09/03/2020    ANIONGAP 13 09/03/2020    ESTGFRAFRICA >60.0 09/03/2020    EGFRNONAA >60.0 09/03/2020    TSH 0.064 (L) 09/17/2020     No results found for: [id number]  Assessment and Plan     1. Type 2 diabetes mellitus with hyperglycemia, with long-term current use of insulin  empagliflozin (JARDIANCE) 10 mg tablet    Comprehensive metabolic panel    Hemoglobin A1C    Microalbumin/creatinine urine ratio    Ambulatory referral/consult to Podiatry   2. Cerebrovascular " accident (CVA), unspecified mechanism     3. Coronary artery disease, angina presence unspecified, unspecified vessel or lesion type, unspecified whether native or transplanted heart     4. Obesity, unspecified classification, unspecified obesity type, unspecified whether serious comorbidity present     5. Other specified hypothyroidism  TSH    levothyroxine (SYNTHROID) 150 MCG tablet   6. Essential (primary) hypertension     7. Hyperlipidemia, unspecified hyperlipidemia type         Type 2 diabetes mellitus with hyperglycemia, with long-term current use of insulin  Given recent CVA and known CAD would benefit from addition of medication with known CVD benefit  Will make the following changes:   Continue Metformin 500 mg twice daily   Continue Trulicity 0.75 mg weekly      Stop Toujeo 20 units    Start Jardiance 10 mg daily: Discussed MOA and SE. Discussed MOA is to make her urinate out sugar. Discussed side effects include UTIs/yeast infections, Comfort's gangrene, DKA, and orthostatic hypotension. Encouragd to call for intolerable side effects, instructed to drink at least 4-16 oz bottles of water daily, use good bathroom hygiene, eat at least 30 grams of carbs per each meal, call for persistent nausea, vomiting, diarrhea, and change positions carefully. Hold medication if you are ill. Hold medication 3 day prior to surgery.    Check blood sugar 2 times daily and send me a log on Friday. If blood sugars are increased, may increase Trulicity and Jardiance prior to resumption of insulin.  -- Reviewed goals of therapy are to get the best control we can without hypoglycemia  -- Advised frequent self blood glucose monitoring.  Patient encouraged to document glucose results and bring them to every clinic visit    -- Hypoglycemia precautions discussed. Instructed on precautions before driving.    -- Call for Bg repeatedly < 90 or > 180.   -- Close adherence to lifestyle changes recommended.   -- Periodic follow ups for  eye evaluations, foot care and dental care suggested.  -- Given information on diabetic diet and hypoglycemia  -- Having pain on medial right great toe-consult podiatry           Stroke  Glycemic control as above  Start SGLT2 as above. 38% decreased risk of MACE and repeat stroke with Jardiance.    CAD (coronary artery disease)  As above    Obesity  Continue GLP1 as above. Start SGLT2 as above. Stop insulin as above which may contribute to weight gain.   Discussed dietary modification, refer to DM education    Other specified hypothyroidism  Stop LT4 175 mcg daily  Start LT4 150 mcg daily  Repeat TSH 6 weeks    Essential (primary) hypertension  BP at goal today    Hyperlipemia  Controlled  Cont statin         Follow up in about 3 months (around 12/21/2020).

## 2020-09-21 ENCOUNTER — OFFICE VISIT (OUTPATIENT)
Dept: ENDOCRINOLOGY | Facility: CLINIC | Age: 65
End: 2020-09-21
Payer: MEDICARE

## 2020-09-21 VITALS
HEIGHT: 68 IN | BODY MASS INDEX: 37.96 KG/M2 | SYSTOLIC BLOOD PRESSURE: 120 MMHG | DIASTOLIC BLOOD PRESSURE: 71 MMHG | WEIGHT: 250.44 LBS

## 2020-09-21 DIAGNOSIS — E66.9 OBESITY, UNSPECIFIED CLASSIFICATION, UNSPECIFIED OBESITY TYPE, UNSPECIFIED WHETHER SERIOUS COMORBIDITY PRESENT: ICD-10-CM

## 2020-09-21 DIAGNOSIS — E03.8 OTHER SPECIFIED HYPOTHYROIDISM: ICD-10-CM

## 2020-09-21 DIAGNOSIS — Z79.4 TYPE 2 DIABETES MELLITUS WITH HYPERGLYCEMIA, WITH LONG-TERM CURRENT USE OF INSULIN: Primary | ICD-10-CM

## 2020-09-21 DIAGNOSIS — I10 ESSENTIAL (PRIMARY) HYPERTENSION: ICD-10-CM

## 2020-09-21 DIAGNOSIS — I63.9 CEREBROVASCULAR ACCIDENT (CVA), UNSPECIFIED MECHANISM: ICD-10-CM

## 2020-09-21 DIAGNOSIS — E78.5 HYPERLIPIDEMIA, UNSPECIFIED HYPERLIPIDEMIA TYPE: ICD-10-CM

## 2020-09-21 DIAGNOSIS — I25.10 CORONARY ARTERY DISEASE, ANGINA PRESENCE UNSPECIFIED, UNSPECIFIED VESSEL OR LESION TYPE, UNSPECIFIED WHETHER NATIVE OR TRANSPLANTED HEART: ICD-10-CM

## 2020-09-21 DIAGNOSIS — E11.65 TYPE 2 DIABETES MELLITUS WITH HYPERGLYCEMIA, WITH LONG-TERM CURRENT USE OF INSULIN: Primary | ICD-10-CM

## 2020-09-21 PROCEDURE — 99999 PR PBB SHADOW E&M-EST. PATIENT-LVL IV: CPT | Mod: PBBFAC,,, | Performed by: NURSE PRACTITIONER

## 2020-09-21 PROCEDURE — 99214 PR OFFICE/OUTPT VISIT, EST, LEVL IV, 30-39 MIN: ICD-10-PCS | Mod: S$PBB,,, | Performed by: NURSE PRACTITIONER

## 2020-09-21 PROCEDURE — 99999 PR PBB SHADOW E&M-EST. PATIENT-LVL IV: ICD-10-PCS | Mod: PBBFAC,,, | Performed by: NURSE PRACTITIONER

## 2020-09-21 PROCEDURE — 99214 OFFICE O/P EST MOD 30 MIN: CPT | Mod: PBBFAC | Performed by: NURSE PRACTITIONER

## 2020-09-21 PROCEDURE — 99214 OFFICE O/P EST MOD 30 MIN: CPT | Mod: S$PBB,,, | Performed by: NURSE PRACTITIONER

## 2020-09-21 RX ORDER — LEVOTHYROXINE SODIUM 150 UG/1
150 TABLET ORAL
Qty: 30 TABLET | Refills: 11 | Status: SHIPPED | OUTPATIENT
Start: 2020-09-21 | End: 2021-08-23

## 2020-09-21 RX ORDER — EMPAGLIFLOZIN 10 MG/1
10 TABLET, FILM COATED ORAL DAILY
Qty: 30 TABLET | Refills: 4 | Status: SHIPPED | OUTPATIENT
Start: 2020-09-21 | End: 2021-01-31

## 2020-09-21 NOTE — ASSESSMENT & PLAN NOTE
Continue GLP1 as above. Start SGLT2 as above. Stop insulin as above which may contribute to weight gain.   Discussed dietary modification, refer to DM education

## 2020-09-21 NOTE — ASSESSMENT & PLAN NOTE
Given recent CVA and known CAD would benefit from addition of medication with known CVD benefit  Will make the following changes:   Continue Metformin 500 mg twice daily   Continue Trulicity 0.75 mg weekly      Stop Toujeo 20 units    Start Jardiance 10 mg daily: Discussed MOA and SE. Discussed MOA is to make her urinate out sugar. Discussed side effects include UTIs/yeast infections, Comfort's gangrene, DKA, and orthostatic hypotension. Encouragd to call for intolerable side effects, instructed to drink at least 4-16 oz bottles of water daily, use good bathroom hygiene, eat at least 30 grams of carbs per each meal, call for persistent nausea, vomiting, diarrhea, and change positions carefully. Hold medication if you are ill. Hold medication 3 day prior to surgery.    Check blood sugar 2 times daily and send me a log on Friday. If blood sugars are increased, may increase Trulicity and Jardiance prior to resumption of insulin.  -- Reviewed goals of therapy are to get the best control we can without hypoglycemia  -- Advised frequent self blood glucose monitoring.  Patient encouraged to document glucose results and bring them to every clinic visit    -- Hypoglycemia precautions discussed. Instructed on precautions before driving.    -- Call for Bg repeatedly < 90 or > 180.   -- Close adherence to lifestyle changes recommended.   -- Periodic follow ups for eye evaluations, foot care and dental care suggested.  -- Given information on diabetic diet and hypoglycemia  -- Having pain on medial right great toe-consult podiatry

## 2020-09-21 NOTE — ASSESSMENT & PLAN NOTE
Glycemic control as above  Start SGLT2 as above. 38% decreased risk of MACE and repeat stroke with Jardiance.

## 2020-09-21 NOTE — PATIENT INSTRUCTIONS
Hypothyroidism   Check TSH in 6 weeks   Stop Levothyroxine 175 mcg    Start levothyroxine 150 mcg daily    Diabetes   Continue Metformin 500 mg twice daily   Continue Trulicity 0.75 mg weekly      Stop Toujeo 20 units    Start Jardiance 10 mg daily    Check blood sugar 2 times daily and send me a log on Friday. If blood sugars are increased, may increase Trulicity.     Discussed MOA is to make him urinate out sugar. Discussed side effects include UTIs/yeast infections, Comfort's gangrene, DKA, and orthostatic hypotension. Encouragd to call for intolerable side effects, instructed to drink at least 4-16 oz bottles of water daily, use good bathroom hygiene, eat at least 30 grams of carbs per each meal, call for persistent nausea, vomiting, diarrhea, and change positions carefully. Hold medication if you are ill. Hold medication 3 day prior to surgery.      Hypoglycemia - Low Blood Sugar    What can you do?  Rule of 15:    Test your blood sugar   If glucose is between 50-70 mg/dL then ingest 15 grams of fast-acting carbs   If glucose is less than 50 mg/dL then ingest 30 grams of fast-acting carbs   Ingest 15 grams of fast-acting carbohydrate - such as:   a. 3-4 glucose tablets  b. 4 oz juice  c. ½ can regular soda pop  d. 15 skittles or mini jelly beans    Re-check your blood sugar in 15 minutes. If its less than 70mg/dl, repeat steps 2 and 3.   If your next meal is more than 1 hour away, eat an additional 15 grams of carbohydrate and 1 ounce of protein (examples include crackers with cheese or one-half of a sandwich with peanut butter). It is important not to eat too much because this can raise your blood sugar above the target level.      After your blood sugar has normalized, think about why you went low. If you notice a pattern of low blood sugars, contact your Diabetes Team. We may need to adjust your medication.      Diabetic drinks we recommend:   Water -BEST  Crystal light sugar free packets  Gatorade zero    Powerade zero  Tea without sweetener    Diabetic drinks we do not recommend:  Coke or any sugary regular soft drink  Coffee with sugar  Milk  Juice  Beer  Liquor    Sweeteners we recommend:  Jannie Aebl    Note: Caffeine can increase your blood sugar     Snacks can be an important part of a balanced, healthy meal plan. They allow you to eat more frequently, feeling full and satisfied throughout the day. Also, they allow you to spread carbohydrates evenly, which may stabilize blood sugars.  Plus, snacks are enjoyable!     The amount of carbohydrate needed at snacks varies. Generally, about 15-30 grams of carbohydrate per snack is recommended.  Below you will find some tasty treats.       0-5 gm carb   Crystal Light   Vitamin Water Zero   Herbal tea, unsweetened   2 tsp peanut butter on celery   1./2 cup sugar-free jell-o   1 sugar-free popsicle   ¼ cup blueberries   8oz Blue Shannan unsweetened almond milk   5 baby carrots & celery sticks, cucumbers, bell peppers dipped in ¼ cup salsa, 2Tbsp light ranch dressing or 2Tbsp plain Greek yogurt   10 Goldfish crackers   ½ oz low-fat cheese or string cheese   1 closed handful of nuts, unsalted   1 Tbsp of sunflower seeds, unsalted   1 cup Smart Pop popcorn   1 whole grain brown rice cake        15 gm carb   1 small piece of fruit or ½ banana or 1/2 cup lite canned fruit   3 compa cracker squares   3 cups Smart Pop popcorn, top spray butter, [place] lite salt or cinnamon and Truvia   5 Vanilla Wafers   ½ cup low fat, no added sugar ice cream or frozen yogurt (Blue bell, Blue Bunny, Weight Watchers, Skinny Cow)   ½ turkey, ham, or chicken sandwich   ½ c fruit with ½ c Cottage cheese   4-6 unsalted wheat crackers with 1 oz low fat cheese or 1 tbsp peanut butter    30-45 goldfish crackers (depending on flavor)    7-8 Congregational mini brown rice cakes (caramel, apple cinnamon, chocolate)    12 Congregational mini brown rice cakes (cheddar, bbq, ranch)     1/3 cup hummus dip with raw veg   1/2 whole wheat carina, 1Tbsp hummus   Mini Pizza (1/2 whole wheat English muffin, low-fat  cheese, tomato sauce)   100 calorie snack pack (Oreo, Chips Ahoy, Ritz Mix, Baked Cheetos)   4-6 oz. light or Greek Style yogurt (Chobani, Yoplait, Okios, [place]oneyfield)   ½ cup sugar-free pudding     6 in. wheat tortilla or carina oven toasted chips (topped with spray butter flavoring, cinnamon, Truvia OR spray butter, garlic powder, chili powder)    18 BBQ Popchips (available at [employer], [employer], Fresh Market)

## 2020-09-23 ENCOUNTER — PATIENT MESSAGE (OUTPATIENT)
Dept: NEUROLOGY | Facility: CLINIC | Age: 65
End: 2020-09-23

## 2020-09-24 ENCOUNTER — PATIENT MESSAGE (OUTPATIENT)
Dept: ENDOCRINOLOGY | Facility: CLINIC | Age: 65
End: 2020-09-24

## 2020-09-24 DIAGNOSIS — E03.9 HYPOTHYROIDISM, UNSPECIFIED TYPE: Primary | ICD-10-CM

## 2020-09-28 ENCOUNTER — HOSPITAL ENCOUNTER (OUTPATIENT)
Dept: RADIOLOGY | Facility: HOSPITAL | Age: 65
Discharge: HOME | End: 2020-09-28
Attending: STUDENT IN AN ORGANIZED HEALTH CARE EDUCATION/TRAINING PROGRAM
Payer: MEDICARE

## 2020-09-28 DIAGNOSIS — Z86.73 HISTORY OF ISCHEMIC MULTIFOCAL MULTIPLE VASCULAR TERRITORIES STROKE: ICD-10-CM

## 2020-09-28 DIAGNOSIS — Z86.73 CHRONIC ISCHEMIC RIGHT PCA STROKE: ICD-10-CM

## 2020-09-28 DIAGNOSIS — Z86.73 CHRONIC ISCHEMIC LEFT POSTERIOR CEREBRAL ARTERY (PCA) STROKE: ICD-10-CM

## 2020-09-28 PROCEDURE — 74177 CT ABD & PELVIS W/CONTRAST: CPT | Mod: 26,,, | Performed by: RADIOLOGY

## 2020-09-28 PROCEDURE — 74177 CT ABD & PELVIS W/CONTRAST: CPT | Mod: TC

## 2020-09-28 PROCEDURE — 25500020 PHARM REV CODE 255: Performed by: STUDENT IN AN ORGANIZED HEALTH CARE EDUCATION/TRAINING PROGRAM

## 2020-09-28 PROCEDURE — 71260 CT CHEST ABDOMEN PELVIS WITH CONTRAST (XPD): ICD-10-PCS | Mod: 26,,, | Performed by: RADIOLOGY

## 2020-09-28 PROCEDURE — 71260 CT THORAX DX C+: CPT | Mod: 26,,, | Performed by: RADIOLOGY

## 2020-09-28 PROCEDURE — 74177 CT CHEST ABDOMEN PELVIS WITH CONTRAST (XPD): ICD-10-PCS | Mod: 26,,, | Performed by: RADIOLOGY

## 2020-09-28 RX ADMIN — IOHEXOL 100 ML: 350 INJECTION, SOLUTION INTRAVENOUS at 07:09

## 2020-09-28 RX ADMIN — IOHEXOL 15 ML: 350 INJECTION, SOLUTION INTRAVENOUS at 07:09

## 2020-09-29 ENCOUNTER — HOME CARE VISIT (OUTPATIENT)
Dept: NEUROLOGY | Facility: HOSPITAL | Age: 65
End: 2020-09-29

## 2020-09-29 DIAGNOSIS — Z86.73 CHRONIC ISCHEMIC RIGHT PCA STROKE: Primary | ICD-10-CM

## 2020-09-29 DIAGNOSIS — Z01.812 PRE-PROCEDURE LAB EXAM: ICD-10-CM

## 2020-09-30 ENCOUNTER — TELEPHONE (OUTPATIENT)
Dept: NEUROLOGY | Facility: CLINIC | Age: 65
End: 2020-09-30

## 2020-09-30 NOTE — TELEPHONE ENCOUNTER
Spoke to the patient to let him know he has to fast the night before his test on 10/6. I explained to the patient he will need to complete a lab on 10/3 on the [facility]. [facility] Patient verbalized understanding.

## 2020-10-01 ENCOUNTER — PATIENT MESSAGE (OUTPATIENT)
Dept: NEUROLOGY | Facility: CLINIC | Age: 65
End: 2020-10-01

## 2020-10-02 NOTE — TELEPHONE ENCOUNTER
Hey! He can take everything except his diabetic medication. Others he would have to take with small sips. I don't know if they are going to call him to give him instructions.

## 2020-10-03 ENCOUNTER — LAB VISIT (OUTPATIENT)
Dept: URGENT CARE | Facility: CLINIC | Age: 65
End: 2020-10-03
Payer: MEDICARE

## 2020-10-03 DIAGNOSIS — Z86.73 CHRONIC ISCHEMIC RIGHT PCA STROKE: ICD-10-CM

## 2020-10-03 DIAGNOSIS — Z01.812 PRE-PROCEDURE LAB EXAM: ICD-10-CM

## 2020-10-03 PROCEDURE — 99211 OFF/OP EST MAY X REQ PHY/QHP: CPT | Mod: S$GLB,,, | Performed by: NURSE PRACTITIONER

## 2020-10-03 PROCEDURE — U0003 INFECTIOUS AGENT DETECTION BY NUCLEIC ACID (DNA OR RNA); SEVERE ACUTE RESPIRATORY SYNDROME CORONAVIRUS 2 (SARS-COV-2) (CORONAVIRUS DISEASE [COVID-19]), AMPLIFIED PROBE TECHNIQUE, MAKING USE OF HIGH THROUGHPUT TECHNOLOGIES AS DESCRIBED BY CMS-2020-01-R: HCPCS

## 2020-10-03 PROCEDURE — 99211 PR OFFICE/OUTPT VISIT, EST, LEVL I: ICD-10-PCS | Mod: S$GLB,,, | Performed by: NURSE PRACTITIONER

## 2020-10-04 LAB — SARS-COV-2 RNA RESP QL NAA+PROBE: NOT DETECTED

## 2020-10-06 ENCOUNTER — HOSPITAL ENCOUNTER (OUTPATIENT)
Dept: CARDIOLOGY | Facility: HOSPITAL | Age: 65
Discharge: HOME | End: 2020-10-06
Attending: STUDENT IN AN ORGANIZED HEALTH CARE EDUCATION/TRAINING PROGRAM
Payer: MEDICARE

## 2020-10-06 ENCOUNTER — ANESTHESIA (OUTPATIENT)
Dept: MEDSURG UNIT | Facility: HOSPITAL | Age: 65
End: 2020-10-06
Payer: MEDICARE

## 2020-10-06 ENCOUNTER — ANESTHESIA EVENT (OUTPATIENT)
Dept: MEDSURG UNIT | Facility: HOSPITAL | Age: 65
End: 2020-10-06
Payer: MEDICARE

## 2020-10-06 VITALS
WEIGHT: 246 LBS | DIASTOLIC BLOOD PRESSURE: 63 MMHG | BODY MASS INDEX: 37.28 KG/M2 | HEIGHT: 68 IN | SYSTOLIC BLOOD PRESSURE: 131 MMHG

## 2020-10-06 DIAGNOSIS — I74.8 EMBOLISM AND THROMBOSIS OF OTHER ARTERIES: ICD-10-CM

## 2020-10-06 DIAGNOSIS — Z86.73 HISTORY OF ISCHEMIC MULTIFOCAL MULTIPLE VASCULAR TERRITORIES STROKE: ICD-10-CM

## 2020-10-06 DIAGNOSIS — Z86.73 CHRONIC ISCHEMIC LEFT POSTERIOR CEREBRAL ARTERY (PCA) STROKE: ICD-10-CM

## 2020-10-06 DIAGNOSIS — Z86.73 CHRONIC ISCHEMIC RIGHT PCA STROKE: ICD-10-CM

## 2020-10-06 LAB
ASCENDING AORTA: 3.9 CM
BSA FOR ECHO PROCEDURE: 2.31 M2
SINUS: 4.6 CM
STJ: 3.7 CM

## 2020-10-06 PROCEDURE — 25000003 PHARM REV CODE 250: Performed by: NURSE ANESTHETIST, CERTIFIED REGISTERED

## 2020-10-06 PROCEDURE — 93320 TRANSESOPHAGEAL ECHO (TEE) (CUPID ONLY): ICD-10-PCS | Mod: 26,,, | Performed by: INTERNAL MEDICINE

## 2020-10-06 PROCEDURE — 93325 DOPPLER ECHO COLOR FLOW MAPG: CPT

## 2020-10-06 PROCEDURE — 93312 TRANSESOPHAGEAL ECHO (TEE) (CUPID ONLY): ICD-10-PCS | Mod: 26,,, | Performed by: INTERNAL MEDICINE

## 2020-10-06 PROCEDURE — 63600175 PHARM REV CODE 636 W HCPCS: Performed by: NURSE ANESTHETIST, CERTIFIED REGISTERED

## 2020-10-06 PROCEDURE — D9220A PRA ANESTHESIA: Mod: CRNA,,, | Performed by: NURSE ANESTHETIST, CERTIFIED REGISTERED

## 2020-10-06 PROCEDURE — 93325 DOPPLER ECHO COLOR FLOW MAPG: CPT | Mod: 26,,, | Performed by: INTERNAL MEDICINE

## 2020-10-06 PROCEDURE — D9220A PRA ANESTHESIA: ICD-10-PCS | Mod: CRNA,,, | Performed by: NURSE ANESTHETIST, CERTIFIED REGISTERED

## 2020-10-06 PROCEDURE — D9220A PRA ANESTHESIA: Mod: ANES,,, | Performed by: ANESTHESIOLOGY

## 2020-10-06 PROCEDURE — 25000003 PHARM REV CODE 250: Performed by: STUDENT IN AN ORGANIZED HEALTH CARE EDUCATION/TRAINING PROGRAM

## 2020-10-06 PROCEDURE — 93320 DOPPLER ECHO COMPLETE: CPT | Mod: 26,,, | Performed by: INTERNAL MEDICINE

## 2020-10-06 PROCEDURE — D9220A PRA ANESTHESIA: ICD-10-PCS | Mod: ANES,,, | Performed by: ANESTHESIOLOGY

## 2020-10-06 PROCEDURE — 93312 ECHO TRANSESOPHAGEAL: CPT | Mod: 26,,, | Performed by: INTERNAL MEDICINE

## 2020-10-06 PROCEDURE — 93325 TRANSESOPHAGEAL ECHO (TEE) (CUPID ONLY): ICD-10-PCS | Mod: 26,,, | Performed by: INTERNAL MEDICINE

## 2020-10-06 RX ORDER — PROPOFOL 10 MG/ML
VIAL (ML) INTRAVENOUS CONTINUOUS PRN
Status: DISCONTINUED | OUTPATIENT
Start: 2020-10-06 | End: 2020-10-06

## 2020-10-06 RX ORDER — LIDOCAINE HYDROCHLORIDE 20 MG/ML
INJECTION INTRAVENOUS
Status: DISCONTINUED | OUTPATIENT
Start: 2020-10-06 | End: 2020-10-06

## 2020-10-06 RX ORDER — LIDOCAINE HYDROCHLORIDE 20 MG/ML
SOLUTION OROPHARYNGEAL
Status: DISCONTINUED | OUTPATIENT
Start: 2020-10-06 | End: 2020-10-06

## 2020-10-06 RX ORDER — PROPOFOL 10 MG/ML
VIAL (ML) INTRAVENOUS
Status: DISCONTINUED | OUTPATIENT
Start: 2020-10-06 | End: 2020-10-06

## 2020-10-06 RX ADMIN — PROPOFOL 150 MCG/KG/MIN: 10 INJECTION, EMULSION INTRAVENOUS at 11:10

## 2020-10-06 RX ADMIN — SODIUM CHLORIDE: 0.9 INJECTION, SOLUTION INTRAVENOUS at 11:10

## 2020-10-06 RX ADMIN — PROPOFOL 20 MG: 10 INJECTION, EMULSION INTRAVENOUS at 11:10

## 2020-10-06 RX ADMIN — LIDOCAINE HYDROCHLORIDE 100 MG: 20 INJECTION, SOLUTION INTRAVENOUS at 11:10

## 2020-10-06 RX ADMIN — LIDOCAINE HYDROCHLORIDE 5 ML: 20 SOLUTION OROPHARYNGEAL at 11:10

## 2020-10-06 RX ADMIN — PROPOFOL 50 MG: 10 INJECTION, EMULSION INTRAVENOUS at 11:10

## 2020-10-06 NOTE — TRANSFER OF CARE
"Anesthesia Transfer of Care Note    Patient: Lorena Rodriguez    Procedure(s) Performed: Procedure(s) (LRB):  ECHOCARDIOGRAM,TRANSESOPHAGEAL (N/A)    Patient location: PACU    Anesthesia Type: general    Transport from OR: Transported from OR on 2-3 L/min O2 by NC with adequate spontaneous ventilation    Post pain: adequate analgesia    Post assessment: no apparent anesthetic complications    Post vital signs: stable    Level of consciousness: awake    Nausea/Vomiting: no nausea/vomiting    Complications: none    Transfer of care protocol was followed      Last vitals:   Visit Vitals  /67 (BP Location: Right arm, Patient Position: Sitting)   Pulse 69   Temp 36 °C (96.8 °F) (Oral)   Resp 18   Ht 5' 8" (1.727 m)   Wt 111.6 kg (246 lb)   SpO2 95%   BMI 37.40 kg/m²     "

## 2020-10-06 NOTE — ANESTHESIA PREPROCEDURE EVALUATION
10/06/2020  Pre-operative evaluation for Procedure(s) (LRB):  ECHOCARDIOGRAM,TRANSESOPHAGEAL (N/A)    Lorena Rodriguez is a 65 y.o. male with hx of CVA here for GABRIEL to evaluate    Patient Active Problem List   Diagnosis    Complete rotator cuff tear or rupture of right shoulder, not specified as traumatic    Impingement syndrome of shoulder, right    Status post shoulder surgery    Weakness of shoulder    Decreased right shoulder range of motion    Acute pain of right shoulder    Embolic stroke involving right cerebellar artery    Essential (primary) hypertension    Hyperlipemia    Homonymous hemianopsia following cerebrovascular accident    Cytotoxic cerebral edema    Hemianopia of left eye    Cerebral infarction involving right cerebellar artery    History of ischemic multifocal multiple vascular territories stroke    Stroke    Type 2 diabetes mellitus with hyperglycemia, with long-term current use of insulin    Obesity    Cerebral infarction involving left cerebellar artery    Acute ischemic left MCA stroke    CAD (coronary artery disease)    Other specified hypothyroidism    Chronic ischemic left posterior cerebral artery (PCA) stroke    Chronic ischemic right PCA stroke    Embolism and thrombosis of other arteries        Review of patient's allergies indicates:  No Known Allergies    No current facility-administered medications on file prior to encounter.      Current Outpatient Medications on File Prior to Encounter   Medication Sig Dispense Refill    amLODIPine (NORVASC) 10 MG tablet Take 10 mg by mouth once daily.      atorvastatin (LIPITOR) 40 MG tablet Take 1 tablet (40 mg total) by mouth once daily. 90 tablet 3    clopidogreL (PLAVIX) 75 mg tablet Take 75 mg by mouth once daily.      empagliflozin (JARDIANCE) 10 mg tablet Take 1 tablet (10 mg total) by mouth once daily. 30  tablet 4    levothyroxine (SYNTHROID) 150 MCG tablet Take 1 tablet (150 mcg total) by mouth before breakfast. 30 tablet 11    metFORMIN (GLUCOPHAGE) 1000 MG tablet Take 1 tablet (1,000 mg total) by mouth 2 (two) times daily with meals. 180 tablet 3    metoprolol succinate (TOPROL-XL) 50 MG 24 hr tablet Take 50 mg by mouth once daily.      olmesartan-hydrochlorothiazide (BENICAR HCT) 20-12.5 mg per tablet Take 1 tablet by mouth once daily.      aspirin 325 MG tablet Take 325 mg by mouth.      dulaglutide (TRULICITY) 0.75 mg/0.5 mL pen injector Inject 0.75 mg into the skin once a week. 4 pen 5    NITROGLYCERIN SL Place 0.4 mg under the tongue daily as needed.         Past Surgical History:   Procedure Laterality Date    ARTHROSCOPIC REPAIR OF ROTATOR CUFF OF SHOULDER Right 8/20/2019    Procedure: REPAIR, ROTATOR CUFF, ARTHROSCOPIC;  Surgeon: Nj You MD;  Location: [facility];  Service: Orthopedics;  Laterality: Right;    ARTHROSCOPY OF SHOULDER WITH DECOMPRESSION OF SUBACROMIAL SPACE Right 8/20/2019    Procedure: ARTHROSCOPY, SHOULDER, WITH SUBACROMIAL SPACE DECOMPRESSION;  Surgeon: Nj You MD;  Location: [facility] OR;  Service: Orthopedics;  Laterality: Right;  WITH BLOCK    CHONDROPLASTY OF SHOULDER Right 8/20/2019    Procedure: CHONDROPLASTY, SHOULDER;  Surgeon: Nj You MD;  Location: [facility];  Service: Orthopedics;  Laterality: Right;    CORONARY STENT PLACEMENT      EYE SURGERY      HAND AMPUTATION THROUGH METACARPAL      right    HEMORRHOID SURGERY      HERNIA REPAIR      SHOULDER SURGERY      right       Social History     Socioeconomic History    Marital status:      Spouse name: Not on file    Number of children: Not on file    Years of education: Not on file    Highest education level: Not on file   Occupational History    Not on file   Social Needs    Financial resource strain: Not on file    Food insecurity     Worry: Not on file     Inability: Not on file     Transportation needs     Medical: Not on file     Non-medical: Not on file   Tobacco Use    Smoking status: Former Smoker     Types: Cigarettes, Cigars    Smokeless tobacco: Former User   Substance and Sexual Activity    Alcohol use: Never     Frequency: Never    Drug use: Never    Sexual activity: Not on file   Lifestyle    Physical activity     Days per week: Not on file     Minutes per session: Not on file    Stress: Not on file   Relationships    Social connections     Talks on phone: Not on file     Gets together: Not on file     Attends Yarsanism service: Not on file     Active member of club or organization: Not on file     Attends meetings of clubs or organizations: Not on file     Relationship status: Not on file   Other Topics Concern    Not on file   Social History Narrative    Not on file             Anesthesia Evaluation    I have reviewed the Patient Summary Reports.    I have reviewed the Nursing Notes. I have reviewed the NPO Status.      Review of Systems  Anesthesia Hx:  No problems with previous Anesthesia    Cardiovascular:   Hypertension CAD      Pulmonary:  Pulmonary Normal    Renal/:  Renal/ Normal     Hepatic/GI:  Hepatic/GI Normal    Neurological:   CVA    Endocrine:   Diabetes Hypothyroidism        Physical Exam  General:  Well nourished    Airway/Jaw/Neck:  Airway Findings: Mouth Opening: Normal Tongue: Normal  General Airway Assessment: Adult  Mallampati: II  TM Distance: Normal, at least 6 cm       Chest/Lungs:  Chest/Lungs Findings: Clear to auscultation, Normal Respiratory Rate     Heart/Vascular:  Heart Findings: Rate: Normal  Rhythm: Regular Rhythm             Anesthesia Plan  Type of Anesthesia, risks & benefits discussed:  Anesthesia Type:  general, MAC  Patient's Preference:   Intra-op Monitoring Plan: standard ASA monitors  Intra-op Monitoring Plan Comments:   Post Op Pain Control Plan: multimodal analgesia and IV/PO Opioids PRN  Post Op Pain Control Plan  Comments:   Induction:   IV  Beta Blocker:         Informed Consent: Patient understands risks and agrees with Anesthesia plan.  Questions answered. Anesthesia consent signed with patient.  ASA Score: 3     Day of Surgery Review of History & Physical:            Ready For Surgery From Anesthesia Perspective.

## 2020-10-07 NOTE — ANESTHESIA POSTPROCEDURE EVALUATION
Anesthesia Post Evaluation    Patient: Lorena Rodriguez    Procedure(s) Performed: Procedure(s) (LRB):  ECHOCARDIOGRAM,TRANSESOPHAGEAL (N/A)    Final Anesthesia Type: general    Patient location during evaluation: PACU  Patient participation: Yes- Able to Participate  Level of consciousness: awake and alert  Post-procedure vital signs: reviewed and stable  Pain management: adequate  Airway patency: patent    PONV status at discharge: No PONV  Anesthetic complications: no      Cardiovascular status: blood pressure returned to baseline  Respiratory status: unassisted  Follow-up not needed.          Vitals Value Taken Time   /67 10/06/20 1231   Temp 36.4 °C (97.5 °F) 10/06/20 1153   Pulse 64 10/06/20 1235   Resp 16 10/06/20 1230   SpO2 95 % 10/06/20 1235   Vitals shown include unvalidated device data.      No case tracking events are documented in the log.      Pain/Duke Score: Duke Score: 10 (10/6/2020 12:30 PM)

## 2020-10-12 VITALS
BODY MASS INDEX: 38.44 KG/M2 | WEIGHT: 252.81 LBS | DIASTOLIC BLOOD PRESSURE: 70 MMHG | SYSTOLIC BLOOD PRESSURE: 122 MMHG | OXYGEN SATURATION: 100 % | HEART RATE: 64 BPM

## 2020-10-12 NOTE — PROGRESS NOTES
Mr. Rodriguez denies any ER visit or hospital stays since last SM encounter. VS are WNL on today's visit. CBG's fasting have ranges 120's-140's. LHE educated patient and wife on purpose of SM program, stroke RF present, DM, HTN, and elevated HLP.

## 2020-10-14 ENCOUNTER — OFFICE VISIT (OUTPATIENT)
Dept: PODIATRY | Facility: CLINIC | Age: 65
End: 2020-10-14
Payer: MEDICARE

## 2020-10-14 VITALS
WEIGHT: 246 LBS | DIASTOLIC BLOOD PRESSURE: 78 MMHG | HEIGHT: 68 IN | SYSTOLIC BLOOD PRESSURE: 139 MMHG | BODY MASS INDEX: 37.28 KG/M2

## 2020-10-14 DIAGNOSIS — M54.31 SCIATICA, RIGHT SIDE: ICD-10-CM

## 2020-10-14 DIAGNOSIS — E11.9 COMPREHENSIVE DIABETIC FOOT EXAMINATION, TYPE 2 DM, ENCOUNTER FOR: Primary | ICD-10-CM

## 2020-10-14 DIAGNOSIS — M20.41 HAMMER TOES OF BOTH FEET: ICD-10-CM

## 2020-10-14 DIAGNOSIS — E11.65 TYPE 2 DIABETES MELLITUS WITH HYPERGLYCEMIA, WITH LONG-TERM CURRENT USE OF INSULIN: ICD-10-CM

## 2020-10-14 DIAGNOSIS — M20.5X2 HALLUX LIMITUS, ACQUIRED, LEFT: ICD-10-CM

## 2020-10-14 DIAGNOSIS — M20.5X1 HALLUX LIMITUS, ACQUIRED, RIGHT: ICD-10-CM

## 2020-10-14 DIAGNOSIS — R20.2 PARESTHESIA OF RIGHT FOOT: ICD-10-CM

## 2020-10-14 DIAGNOSIS — M20.42 HAMMER TOES OF BOTH FEET: ICD-10-CM

## 2020-10-14 DIAGNOSIS — Z79.4 TYPE 2 DIABETES MELLITUS WITH HYPERGLYCEMIA, WITH LONG-TERM CURRENT USE OF INSULIN: ICD-10-CM

## 2020-10-14 PROCEDURE — 99215 OFFICE O/P EST HI 40 MIN: CPT | Mod: PBBFAC,PO | Performed by: PODIATRIST

## 2020-10-14 PROCEDURE — 99999 PR PBB SHADOW E&M-EST. PATIENT-LVL V: ICD-10-PCS | Mod: PBBFAC,,, | Performed by: PODIATRIST

## 2020-10-14 PROCEDURE — 99999 PR PBB SHADOW E&M-EST. PATIENT-LVL V: CPT | Mod: PBBFAC,,, | Performed by: PODIATRIST

## 2020-10-14 PROCEDURE — 99203 OFFICE O/P NEW LOW 30 MIN: CPT | Mod: S$PBB,,, | Performed by: PODIATRIST

## 2020-10-14 PROCEDURE — 99203 PR OFFICE/OUTPT VISIT, NEW, LEVL III, 30-44 MIN: ICD-10-PCS | Mod: S$PBB,,, | Performed by: PODIATRIST

## 2020-10-14 NOTE — PROGRESS NOTES
Subjective:      Patient ID: Lorena Rodriguez is a 65 y.o. male.    Chief Complaint: Diabetes Mellitus (PCP  ), Foot Pain (Right foot great toe ), and Foot Problem    Lorena is a 65 y.o. male who presents to the clinic upon referral from Dr. Leiva  for evaluation and treatment of diabetic feet. Lorena has a past medical history of Coronary artery disease, Diabetes mellitus, Hypertension, and Stroke. Patient relates self-limiting tingling and numbness to the medial aspect of the right great toe for the last several years.  He states that it has not been well controlled with any treatment modalities that he has tried other than applying a sharp instruments to the side of the toe when it hurts.  He relates that the symptoms primarily occur at rest and when he is trying to go to sleep.  He relates history of sciatica on his right side treated with injections.      PCP: Sha Soliman MD    Date Last Seen by PCP:   Chief Complaint   Patient presents with    Diabetes Mellitus     PCP      Foot Pain     Right foot great toe     Foot Problem     Current shoe gear: Casual shoes    Hemoglobin A1C   Date Value Ref Range Status   08/03/2020 6.7 (H) 4.0 - 5.6 % Final     Comment:     ADA Screening Guidelines:  5.7-6.4%  Consistent with prediabetes  >or=6.5%  Consistent with diabetes  High levels of fetal hemoglobin interfere with the HbA1C  assay. Heterozygous hemoglobin variants (HbS, HgC, etc)do  not significantly interfere with this assay.   However, presence of multiple variants may affect accuracy.     06/05/2020 7.0 (H) <5.7 % Final               Patient Active Problem List   Diagnosis    Complete rotator cuff tear or rupture of right shoulder, not specified as traumatic    Impingement syndrome of shoulder, right    Status post shoulder surgery    Weakness of shoulder    Decreased right shoulder range of motion    Acute pain of right shoulder    Embolic stroke involving right cerebellar artery    Essential  (primary) hypertension    Hyperlipemia    Homonymous hemianopsia following cerebrovascular accident    Cytotoxic cerebral edema    Hemianopia of left eye    Cerebral infarction involving right cerebellar artery    History of ischemic multifocal multiple vascular territories stroke    Stroke    Type 2 diabetes mellitus with hyperglycemia, with long-term current use of insulin    Obesity    Cerebral infarction involving left cerebellar artery    Acute ischemic left MCA stroke    CAD (coronary artery disease)    Other specified hypothyroidism    Chronic ischemic left posterior cerebral artery (PCA) stroke    Chronic ischemic right PCA stroke    Embolism and thrombosis of other arteries        Current Outpatient Medications on File Prior to Visit   Medication Sig Dispense Refill    amLODIPine (NORVASC) 10 MG tablet Take 10 mg by mouth once daily.      aspirin 325 MG tablet Take 325 mg by mouth.      atorvastatin (LIPITOR) 40 MG tablet Take 1 tablet (40 mg total) by mouth once daily. 90 tablet 3    clopidogreL (PLAVIX) 75 mg tablet Take 75 mg by mouth once daily.      dulaglutide (TRULICITY) 0.75 mg/0.5 mL pen injector Inject 0.75 mg into the skin once a week. 4 pen 5    empagliflozin (JARDIANCE) 10 mg tablet Take 1 tablet (10 mg total) by mouth once daily. 30 tablet 4    levothyroxine (SYNTHROID) 150 MCG tablet Take 1 tablet (150 mcg total) by mouth before breakfast. 30 tablet 11    metFORMIN (GLUCOPHAGE) 1000 MG tablet Take 1 tablet (1,000 mg total) by mouth 2 (two) times daily with meals. 180 tablet 3    metoprolol succinate (TOPROL-XL) 50 MG 24 hr tablet Take 50 mg by mouth once daily.      NITROGLYCERIN SL Place 0.4 mg under the tongue daily as needed.      olmesartan-hydrochlorothiazide (BENICAR HCT) 20-12.5 mg per tablet Take 1 tablet by mouth once daily.       No current facility-administered medications on file prior to visit.        Review of patient's allergies indicates:  No Known  Allergies    Past Surgical History:   Procedure Laterality Date    ARTHROSCOPIC REPAIR OF ROTATOR CUFF OF SHOULDER Right 8/20/2019    Procedure: REPAIR, ROTATOR CUFF, ARTHROSCOPIC;  Surgeon: Nj You MD;  Location: [facility];  Service: Orthopedics;  Laterality: Right;    ARTHROSCOPY OF SHOULDER WITH DECOMPRESSION OF SUBACROMIAL SPACE Right 8/20/2019    Procedure: ARTHROSCOPY, SHOULDER, WITH SUBACROMIAL SPACE DECOMPRESSION;  Surgeon: Nj You MD;  Location: [facility];  Service: Orthopedics;  Laterality: Right;  WITH BLOCK    CHONDROPLASTY OF SHOULDER Right 8/20/2019    Procedure: CHONDROPLASTY, SHOULDER;  Surgeon: Nj You MD;  Location: [facility];  Service: Orthopedics;  Laterality: Right;    CORONARY STENT PLACEMENT      EYE SURGERY      HAND AMPUTATION THROUGH METACARPAL      right    HEMORRHOID SURGERY      HERNIA REPAIR      SHOULDER SURGERY      right       History reviewed. No pertinent family history.    Social History     Socioeconomic History    Marital status:      Spouse name: Not on file    Number of children: Not on file    Years of education: Not on file    Highest education level: Not on file   Occupational History    Not on file   Social Needs    Financial resource strain: Not on file    Food insecurity     Worry: Not on file     Inability: Not on file    Transportation needs     Medical: Not on file     Non-medical: Not on file   Tobacco Use    Smoking status: Former Smoker     Types: Cigarettes, Cigars    Smokeless tobacco: Former User   Substance and Sexual Activity    Alcohol use: Never     Frequency: Never    Drug use: Never    Sexual activity: Not on file   Lifestyle    Physical activity     Days per week: Not on file     Minutes per session: Not on file    Stress: Not on file   Relationships    Social connections     Talks on phone: Not on file     Gets together: Not on file     Attends Spiritism service: Not on file     Active member of club  "or organization: Not on file     Attends meetings of clubs or organizations: Not on file     Relationship status: Not on file   Other Topics Concern    Not on file   Social History Narrative    Not on file       Review of Systems   Constitution: Negative for chills and fever.   Cardiovascular: Negative for claudication and leg swelling.   Respiratory: Negative for cough and shortness of breath.    Skin: Negative for dry skin, itching, nail changes and rash.   Musculoskeletal: Positive for back pain, joint pain and myalgias. Negative for falls, joint swelling and muscle weakness.   Gastrointestinal: Negative for diarrhea, nausea and vomiting.   Neurological: Positive for numbness (Right medial to great toe) and paresthesias. Negative for tremors and weakness.        History of right-sided sciatica   Psychiatric/Behavioral: Negative for altered mental status and hallucinations.           Objective:      Vitals:    10/14/20 1019   BP: 139/78   Weight: 111.6 kg (246 lb)   Height: 5' 8" (1.727 m)   PainSc: 0-No pain       Physical Exam  Vitals signs and nursing note reviewed.   Constitutional:       General: He is not in acute distress.     Appearance: He is well-developed. He is not toxic-appearing or diaphoretic.      Comments: alert and oriented x 3.    Cardiovascular:      Pulses:           Dorsalis pedis pulses are 2+ on the right side and 2+ on the left side.        Posterior tibial pulses are 2+ on the right side and 2+ on the left side.      Comments:  Capillary refill time is within normal limits. Digital hair present.   Pulmonary:      Effort: No respiratory distress.   Musculoskeletal:         General: No deformity.      Right ankle: No tenderness. No lateral malleolus, no medial malleolus, no AITFL, no CF ligament and no posterior TFL tenderness found. Achilles tendon exhibits no pain, no defect and normal Rice's test results.      Left ankle: No tenderness. No lateral malleolus, no medial malleolus, no " AITFL, no CF ligament and no posterior TFL tenderness found. Achilles tendon exhibits no pain, no defect and normal Rice's test results.      Right foot: No tenderness or bony tenderness.      Left foot: No tenderness or bony tenderness.      Comments: Muscle strength is 5/5 in all groups bilaterally.    There is equinus deformity bilateral with decreased dorsiflexion at the ankle joint bilateral. No tenderness with compression of heel. Negative tinels sign. Gait analysis reveals excessive pronation through midstance and propulsion with early heel off. Shoes reveals lateral heel counter wear bilateral     Decreased first MPJ range of motion both weightbearing and nonweightbearing, no crepitus observed the first MP joint, + dorsal flag sign. Mild  bunion deformity is observed .    Patient has hammertoes of digits 2-5 bilateral partially reducible            Feet:      Right foot:      Protective Sensation: 5 sites tested. 5 sites sensed.      Left foot:      Protective Sensation: 5 sites tested. 5 sites sensed.   Lymphadenopathy:      Comments: No lymphatic streaking     Skin:     General: Skin is warm and dry.      Coloration: Skin is not pale.      Findings: No rash.      Nails: There is no clubbing.        Comments: Skin is of normal turgor.   Normal temperature gradient.  Examination of the skin reveals no evidence of significant rashes, open lesions, suspicious appearing nevi or other concerning lesions.    Neurological:      Sensory: No sensory deficit.      Motor: No atrophy.      Comments: Light touch present    [place]-Kisha 5.07 monofilament is intact bilateral feet. Sharp/dull sensation is also intact Bilateral feet.    Paresthesias, and hyperesthesia right medial hallux with no clearly identified trigger or source.       Psychiatric:         Attention and Perception: He is attentive.         Mood and Affect: Mood is not anxious. Affect is not inappropriate.         Speech: He is communicative.  Speech is not slurred.         Behavior: Behavior is not combative.               Assessment:       Encounter Diagnoses   Name Primary?    Type 2 diabetes mellitus with hyperglycemia, with long-term current use of insulin     Comprehensive diabetic foot examination, type 2 DM, encounter for Yes    Paresthesia of right foot     Sciatica, right side     Hammer toes of both feet     Hallux limitus, acquired, left     Hallux limitus, acquired, right          Plan:     Problem List Items Addressed This Visit     Type 2 diabetes mellitus with hyperglycemia, with long-term current use of insulin    Overview     pre-diabetes         Relevant Orders    DIABETIC SHOES FOR HOME USE      Other Visit Diagnoses     Comprehensive diabetic foot examination, type 2 DM, encounter for    -  Primary    Relevant Orders    DIABETIC SHOES FOR HOME USE    Paresthesia of right foot        Sciatica, right side        Hammer toes of both feet        Relevant Orders    DIABETIC SHOES FOR HOME USE    Hallux limitus, acquired, left        Relevant Orders    DIABETIC SHOES FOR HOME USE    Hallux limitus, acquired, right        Relevant Orders    DIABETIC SHOES FOR HOME USE         I counseled the patient on his conditions, their implications and medical management.    Orders Placed This Encounter    DIABETIC SHOES FOR HOME USE       Greater than 50% of this visit spent on counseling and coordination of care.    Education about the diabetic foot, neuropathy, and prevention of limb loss.    Explained possible causes of patients paresthesias including but not limited to systemic illness vs idiopathic vs edema vs low back pathology vs shoe gear.  Given his history of sciatica, paresthesias he is experiencing to his right foot is likely secondary to more proximal nerve entrapment.  Patient is already under the care of a neurologist.  Recommend the patient discuss these symptoms with neurology for further treatment options.  In the meantime,  [facility] for pain cream to be sent to the patient's home.     Shoe inspection. Diabetic Foot Education. Patient reminded of the importance of good nutrition/healthy diet/weight management and blood sugar control to help prevent podiatric complications of diabetes. Patient instructed on proper foot hygeine. Wear comfortable, proper fitting shoes. Wash feet daily. Dry well. After drying, apply moisturizer to feet (no lotion to webspaces). Inspect feet daily for skin breaks, blisters, swelling, or redness. Wear cotton socks (preferably white)  Change socks every day. Do NOT walk barefoot. Do NOT use heating pads or hot water soaks. We discussed wearing proper shoe gear, daily foot inspections, never walking without protective shoe gear.     Discussed edema control and the importance of daily moisturizer to the feet such as Gold bonds diabetic foot cream    Recommend applying vicks vaporub to thick abnormal toenails daily x 6 months to treat fungal nail infection.    Rx diabetic shoes for protection and support    Based on chart review this patient does not qualify for nail care (Patients who qualify for nail care are usually as follows: diabetic with neurological manifestations, PVD, pernicious anemia, or CKD with appropriate modifiers that indicate high amputation risk).     he will continue to monitor the areas daily, inspect his feet, wear protective shoe gear when ambulatory, moisturizer to maintain skin integrity and follow in this office in approximately 12 months, sooner p.r.n.

## 2020-10-14 NOTE — LETTER
October 14, 2020      Raegan Leiva, NP  [address]  [place] [address]           [facility]  [address] [facility]  [place] [address]  Phone: [phone]          Patient: Lorena Rodriguez   MR Number: [id number]   YOB: 1955   Date of Visit: 10/14/2020       Dear Raegan Leiva:    Thank you for referring Lorena Rodriguez to me for evaluation. Attached you will find relevant portions of my assessment and plan of care.    If you have questions, please do not hesitate to call me. I look forward to following Lorena Rodriguez along with you.    Sincerely,    Lacey Hayward, HERMINIA    Enclosure  CC:  No Recipients    If you would like to receive this communication electronically, please contact [email or web address] or [phone] to request more information on Snapchat Link access.    For providers and/or their staff who would like to refer a patient to [facility], please contact us through our one-stop-shop provider referral line, [facility], at [phone].    If you feel you have received this communication in error or would no longer like to receive these types of communications, please e-mail [email or web address]

## 2020-10-14 NOTE — PATIENT INSTRUCTIONS
"You need a shoe with: arch support and a forefoot rocker    Recommend over the counter medicine for nerve osmany:  - Capsaicin cream to rub on at night  -  Absorbine Veterinary Liniment Gel Topical Analgesic Sore Muscle and Joint Pain Relief (found at pet store)  - Alpha lipoic acid 600 mg Cap; Take 1 capsule by mouth once daily for neuropathy or a B complex vitamin daily    Recommend lotions: eucerin, eucerin for diabetics, aquaphor, A&D ointment, gold bond for diabetics, sween, [place]'s Bees all purpose baby ointment,  urea 40 with aloe (found on amazon.com)    Shoe recommendations: (try 6pm.[employer], zappos.[email or web address] [employer] , [email or web address], or shoes.[employer]SEL [employer] for discounted prices) you can visit [employer] shoes in [place]  or EDAN in the [facility] (there are also several shoe brand outlets in the [facility])    Asics (GT 2000 or gel foundations), new balance stability type shoes, saucony (stabil c3),  Rock (GTS or Beast or transcend),  propet (tennis shoe)    sofft brand, clarks, crocs, aerosoles, naturalizers, SAS, ecco, born, mita cheng, rockports (dress shoes)    Vionic, burkenstocks, fitflops, propet (sandals)    Nike comfort thong sandals, crocs, propet (house shoes)      Paraesthesias  Paraesthesia is a burning or prickling sensation that is sometimes felt in the hands, arms, legs or feet. It can also occur in other parts of the body. It can also feel like tingling or numbness, skin crawling, or itching. The feeling is not comfortable, but it is not painful. (The "pins and needles" feeling that happens when a foot or hand "falls asleep" is a temporary paraesthesia.)  Paraesthesias that last or come and go may be caused by medical issues that need to be treated. These include stroke, a bulging disk pressing on a nerve, a trapped nerve, vitamin deficiencies, or even certain medicines.  Tests are often done. These tests may include blood tests, X-ray, CT (computerized tomography) scan, or a muscle test (electromyography). " Depending on the cause, treatment may include physical therapy.  Home care  · Tell the healthcare provider about all medicines you take. This includes prescription and over-the-counter medicines, vitamins, and herbs. Ask if any of the medicines may be causing your problems. Do not make any changes to prescription medicines without talking to your healthcare provider first.  · You may be prescribed medicines to help relieve the tingling feeling or for pain. Take all medicines as directed.  · A numb hand or foot may be more prone to injury. To help protect it:  ¨ Always use oven mitts.  ¨ Test water with an unaffected hand or foot.  ¨ Use caution when trimming nails. File sharp areas.  ¨ Wear shoes that fit well to avoid pressure points, blisters, and ulcers.  ¨ Inspect your hands and feet carefully (including the soles of your feet and between your toes) at least once a week. If you see red areas, sores, or other problems, tell your healthcare provider.  Follow-up care  Follow up with your doctor or as advised by our staff. You may need further testing or evaluation.  When to seek medical advice  Call your healthcare provider right away if any of the following occur:  · Numbness or weakness of the face, one arm, or one leg  · Slurred speech, confusion, trouble speaking, walking, or seeing  · Severe headache, fainting spell, dizziness, or seizure  · Chest, arm, neck, or upper back pain  · Loss of bladder or bowel control  · Open wound with redness, swelling, or pus  Date Last Reviewed: 9/25/2015  © 9175-7526 [employer]. [address] [place] [address], [place] [address]. All rights reserved. This information is not intended as a substitute for professional medical care. Always follow your healthcare professional's instructions.          Wearing Proper Shoes                    You walk on your feet every day, forcing them to support the weight of your body. Repeated stress on your feet can cause damage over time.  The right shoes can help protect your feet. The wrong shoes can cause more foot problems. Read the information below to help you find a shoe that fits your foot needs.      A good shoe fit will cover your foot outline. A shoe that doesnt cover the outline is a bad fit.   Whats your foot shape?  To get a good fit, you need to know the shape of your foot. Do this simple test: While standing, place your foot on a piece of paper and trace around it. Is your foot straight or curved? Do you have a foot problem, such as a bunion, that causes your foot outline to show a bulge on the side of your big toe?  Finding your fit  Bring your foot outline to the shoe store to help you find the right shoe. Place a shoe you like on top of the outline to see if it matches the shape. The shoe should cover the outline. (If you have a bunion, the shoe may not cover the bulge on the outline. Look for soft leather shoes to stretch over the bunion.) Once youve found a pair of proper shoes, put them on. Walk around. Be sure the shoes dont rub or pinch. If the shoes feel good, youve found your fit!  The right shoe for you  A good shoe has features that provide comfort and support. It must also be the right size and shape for your feet. Look for a shoe made of breathable fabric and lining, such as leather or canvas. Be sure that shoes have enough tread to prevent slipping. Go to a good shoe store for help finding the right shoe.  Good shoe features  An ideal shoe has the following:  · Laces for support. If tying laces is a problem for you, try shoes with Velcro fasteners or leigh ann.  · A front of the shoe (toe box) with ½ inch space in front of your longest toes.  · An arch shape that supports your foot.  · No more than 1½ inches of heel.  · A stiff, snug back of the shoe to keep your foot from sliding around.  · A smooth lining with no rough seams.  Shoe shopping tips  Below are some dos and donts for when you go to the shoe  store.  Do:  · Select the shoes that feel right. Wear them around the house. Then bring them to your foot healthcare provider to check for fit. If they dont fit well, return them.  · Shop late in the day, when your feet will be slightly bigger.  · Each time you buy shoes, have both your feet measured while you are standing. Foot size changes with time.  · Pick shoes to suit their purpose. High heels are OK for an occasional night on the town. But for everyday wear, choose a more sensible shoe.  · Try on shoes while wearing any inserts specially made for your feet (orthoses).  · Try on both the right and left shoes. If your feet are different sizes, pick a pair that fits the larger foot.  Dont:  · Dont buy shoes based on shoe size alone. Always try on shoes, as sizes differ from brand to brand and within brands.  · Dont expect shoes to break in. If they dont fit at the store, dont buy them.  · Dont buy a shoe that doesnt match your foot shape.  What about socks?  Always wear socks with shoes. Socks help absorb sweat and reduce friction and blistering. When shopping for shoes, choose soft, padded socks with seams that dont irritate your feet.  If you have foot problems  Some foot problems cause deformities. This can make it hard to find a good fit. Look for shoes made of soft leather to stretch over the deformity. If you have bunions, buy shoes with a wider toe box. To fit hammertoes, look for shoes with a tall toe box. If you have arch problems, you may need inserts. In some cases, youll need to have custom footwear or orthoses made for your feet.  Suggested footwear  Ask your healthcare provider what kind of footwear you need. He or she may recommend a certain brand or shoe store.  Date Last Reviewed: 8/1/2016  © 1665-7646 [employer]. [address] [place] [address], [place] [address]. All rights reserved. This information is not intended as a substitute for professional medical care. Always follow  your healthcare professional's instructions.

## 2020-10-15 ENCOUNTER — PATIENT MESSAGE (OUTPATIENT)
Dept: NEUROLOGY | Facility: CLINIC | Age: 65
End: 2020-10-15

## 2020-10-20 ENCOUNTER — LAB VISIT (OUTPATIENT)
Dept: LAB | Facility: HOSPITAL | Age: 65
End: 2020-10-20
Payer: MEDICARE

## 2020-10-20 ENCOUNTER — PATIENT MESSAGE (OUTPATIENT)
Dept: ENDOCRINOLOGY | Facility: CLINIC | Age: 65
End: 2020-10-20

## 2020-10-20 DIAGNOSIS — E03.8 OTHER SPECIFIED HYPOTHYROIDISM: ICD-10-CM

## 2020-10-20 LAB — TSH SERPL DL<=0.005 MIU/L-ACNC: 0.98 UIU/ML (ref 0.4–4)

## 2020-10-20 PROCEDURE — 36415 COLL VENOUS BLD VENIPUNCTURE: CPT | Mod: PN

## 2020-10-20 PROCEDURE — 84443 ASSAY THYROID STIM HORMONE: CPT

## 2020-10-29 ENCOUNTER — HOME CARE VISIT (OUTPATIENT)
Dept: NEUROLOGY | Facility: HOSPITAL | Age: 65
End: 2020-10-29

## 2020-11-11 ENCOUNTER — PATIENT MESSAGE (OUTPATIENT)
Dept: NEUROLOGY | Facility: CLINIC | Age: 65
End: 2020-11-11

## 2020-12-01 ENCOUNTER — HOME CARE VISIT (OUTPATIENT)
Dept: NEUROLOGY | Facility: HOSPITAL | Age: 65
End: 2020-12-01

## 2020-12-04 NOTE — PROGRESS NOTES
Mr. Carrillo denies any ER visits or hospital stays. PV completed. SM nurse reinforced management of stroke RF.

## 2020-12-22 ENCOUNTER — LAB VISIT (OUTPATIENT)
Dept: LAB | Facility: HOSPITAL | Age: 65
End: 2020-12-22
Attending: NURSE PRACTITIONER
Payer: MEDICARE

## 2020-12-22 DIAGNOSIS — Z79.4 TYPE 2 DIABETES MELLITUS WITH HYPERGLYCEMIA, WITH LONG-TERM CURRENT USE OF INSULIN: ICD-10-CM

## 2020-12-22 DIAGNOSIS — E11.65 TYPE 2 DIABETES MELLITUS WITH HYPERGLYCEMIA, WITH LONG-TERM CURRENT USE OF INSULIN: ICD-10-CM

## 2020-12-22 LAB
ALBUMIN SERPL BCP-MCNC: 4.3 G/DL (ref 3.5–5.2)
ALP SERPL-CCNC: 59 U/L (ref 55–135)
ALT SERPL W/O P-5'-P-CCNC: 32 U/L (ref 10–44)
ANION GAP SERPL CALC-SCNC: 10 MMOL/L (ref 8–16)
AST SERPL-CCNC: 22 U/L (ref 10–40)
BILIRUB SERPL-MCNC: 0.3 MG/DL (ref 0.1–1)
BUN SERPL-MCNC: 21 MG/DL (ref 8–23)
CALCIUM SERPL-MCNC: 9.9 MG/DL (ref 8.7–10.5)
CHLORIDE SERPL-SCNC: 99 MMOL/L (ref 95–110)
CO2 SERPL-SCNC: 31 MMOL/L (ref 23–29)
CREAT SERPL-MCNC: 1.1 MG/DL (ref 0.5–1.4)
EST. GFR  (AFRICAN AMERICAN): >60 ML/MIN/1.73 M^2
EST. GFR  (NON AFRICAN AMERICAN): >60 ML/MIN/1.73 M^2
ESTIMATED AVG GLUCOSE: 128 MG/DL (ref 68–131)
GLUCOSE SERPL-MCNC: 116 MG/DL (ref 70–110)
HBA1C MFR BLD HPLC: 6.1 % (ref 4–5.6)
POTASSIUM SERPL-SCNC: 4.6 MMOL/L (ref 3.5–5.1)
PROT SERPL-MCNC: 8 G/DL (ref 6–8.4)
SODIUM SERPL-SCNC: 140 MMOL/L (ref 136–145)

## 2020-12-22 PROCEDURE — 36415 COLL VENOUS BLD VENIPUNCTURE: CPT | Mod: PN

## 2020-12-22 PROCEDURE — 80053 COMPREHEN METABOLIC PANEL: CPT

## 2020-12-22 PROCEDURE — 83036 HEMOGLOBIN GLYCOSYLATED A1C: CPT

## 2020-12-28 ENCOUNTER — OFFICE VISIT (OUTPATIENT)
Dept: ENDOCRINOLOGY | Facility: CLINIC | Age: 65
End: 2020-12-28
Payer: MEDICARE

## 2020-12-28 VITALS
BODY MASS INDEX: 37.54 KG/M2 | WEIGHT: 247.69 LBS | HEIGHT: 68 IN | SYSTOLIC BLOOD PRESSURE: 122 MMHG | DIASTOLIC BLOOD PRESSURE: 74 MMHG

## 2020-12-28 DIAGNOSIS — E11.65 TYPE 2 DIABETES MELLITUS WITH HYPERGLYCEMIA, WITH LONG-TERM CURRENT USE OF INSULIN: Primary | ICD-10-CM

## 2020-12-28 DIAGNOSIS — D52.1 DRUG-INDUCED FOLATE DEFICIENCY ANEMIA: ICD-10-CM

## 2020-12-28 DIAGNOSIS — I10 ESSENTIAL (PRIMARY) HYPERTENSION: ICD-10-CM

## 2020-12-28 DIAGNOSIS — E78.5 HYPERLIPIDEMIA, UNSPECIFIED HYPERLIPIDEMIA TYPE: ICD-10-CM

## 2020-12-28 DIAGNOSIS — E66.9 OBESITY, UNSPECIFIED CLASSIFICATION, UNSPECIFIED OBESITY TYPE, UNSPECIFIED WHETHER SERIOUS COMORBIDITY PRESENT: ICD-10-CM

## 2020-12-28 DIAGNOSIS — Z79.4 TYPE 2 DIABETES MELLITUS WITH HYPERGLYCEMIA, WITH LONG-TERM CURRENT USE OF INSULIN: Primary | ICD-10-CM

## 2020-12-28 DIAGNOSIS — G62.9 NEUROPATHY: ICD-10-CM

## 2020-12-28 DIAGNOSIS — E03.8 OTHER SPECIFIED HYPOTHYROIDISM: ICD-10-CM

## 2020-12-28 DIAGNOSIS — I63.9 CEREBROVASCULAR ACCIDENT (CVA), UNSPECIFIED MECHANISM: ICD-10-CM

## 2020-12-28 DIAGNOSIS — R06.83 SNORING: ICD-10-CM

## 2020-12-28 PROCEDURE — 99999 PR PBB SHADOW E&M-EST. PATIENT-LVL V: CPT | Mod: PBBFAC,,, | Performed by: NURSE PRACTITIONER

## 2020-12-28 PROCEDURE — 99999 PR PBB SHADOW E&M-EST. PATIENT-LVL V: ICD-10-PCS | Mod: PBBFAC,,, | Performed by: NURSE PRACTITIONER

## 2020-12-28 PROCEDURE — 99214 OFFICE O/P EST MOD 30 MIN: CPT | Mod: S$PBB,,, | Performed by: NURSE PRACTITIONER

## 2020-12-28 PROCEDURE — 99215 OFFICE O/P EST HI 40 MIN: CPT | Mod: PBBFAC | Performed by: NURSE PRACTITIONER

## 2020-12-28 PROCEDURE — 99214 PR OFFICE/OUTPT VISIT, EST, LEVL IV, 30-39 MIN: ICD-10-PCS | Mod: S$PBB,,, | Performed by: NURSE PRACTITIONER

## 2020-12-28 NOTE — PROGRESS NOTES
Subjective:      Patient ID: Lorena Rodriguez is a 65 y.o. male.    Chief Complaint:  Follow-up    History of Present Illness  Lorena Rodriguez with Type 2 diabetes complicated by CAD and CVA, HTN, HLD presents today for follow up of Type 2 Diabetes and hypothyroidism. Previous patient of Dr. Perez. Last seen in 2020.     We first met patient in Aug 2020 whereas he was having multiple TIAs/CVA he is here to discern whether blood sugar may be contributing. He has checked glucose during some episodes and it has been in the 200-300's.  Has seen neurology since his last visit and learned he had one large stroke over 5 episodes in 6 days. Residual slurred speech and loss of vision.    He is accompanied by his wife Jocelin today.     With regards to the diabetes:    Diagnosed with Type 2 DM around   Family History of Type 2 DM: none; parents  when he was young  Known complications:  DKA/HHS: denies  RN: left hemiopsia from stroke; up to date with eye exam  PN: right 1st toes   Nephropathy: denies  Gastroparesis: denies  CAD: stents in     Current regimen:  Metformin 500 mg twice a day-unable to tolerate more-weak and tired   Trulicity 0.75 mg once a week  Jardiance 10 mg daily    Missed doses-denies    Other medications tried:  Toujeo 20 units daily     2 # times a day testing  BG log below. Reviewed BGs are at goal            Hypoglycemic event-denies and no s/s of hypoglycemia   Yes, did not need assistance   Knows how to correct with 15 grams of carbs- juice, coke, or a peppermint.     Dietary recall: He feels that he is following diabetic diet  Eats 3 meals a day.   B: eggs, cereal, pancakes-paleo  L: as below  D: cooked at home; chicken fish steak or shrimp with vegetable and sweet potato  Snacks: sometimes snacks instead of lunch-cheese, yogurt, grapes  Drinks: water, coffee with splenda    Exercise - tried to stay active. No formal exercise. He is remodeling house     Education - last visit: 2020-ALLEY  Jhoan    Has a Medic alert tag-does not eant    Diabetes Management Status  Statin: Taking  ACE/ARB: Taking    Lab Results   Component Value Date    HGBA1C 6.1 (H) 12/22/2020    HGBA1C 6.7 (H) 08/03/2020    HGBA1C 7.0 (H) 06/05/2020     Screening or Prevention Patient's value Goal Complete/Controlled?   HgA1C Testing and Control   Lab Results   Component Value Date    HGBA1C 6.1 (H) 12/22/2020      Annually/Less than 8% Yes   Lipid profile : 08/04/2020 Annually Yes   LDL control Lab Results   Component Value Date    LDLCALC 64.6 08/04/2020    Annually/Less than 100 mg/dl  Yes   Nephropathy screening Lab Results   Component Value Date    LABMICR 5.0 12/22/2020     Lab Results   Component Value Date    PROTEINUA Negative 08/03/2020    Annually Yes   Blood pressure BP Readings from Last 1 Encounters:   12/28/20 122/74    Less than 140/90 Yes   Dilated retinal exam Most Recent Eye Exam Date: Not Found Annually No   Foot exam   : 09/21/2020 Annually No     With regards to hypothyroidism,   Family history of thyroid disease: unknown    Dx in 2010  Current medication:  Generic LT4 150 mcg daily     Takes thyroid medication on an empty stomach and waits 30-45 minutes before eating drinking or taking other medications  Missed doses: denies    Denied neck enlargement, hoarseness, or dysphagia.    current symptoms:     [] weight gain  [] Fatigue  [] Constipation           [] Hair loss  [] Brittle nails   [] Mental fog [] Chest pain, palpations, or sob   []  Heat/cold intolerance  [x] Denies complaints      Lab Results   Component Value Date    TSH 0.976 10/20/2020     With regards to dyslipidemia,     He is atorvastatin 40 mg daily     Ref. Range 8/4/2020 15:20   Cholesterol Latest Ref Range: 120 - 199 mg/dL 136   HDL Latest Ref Range: 40 - 75 mg/dL 42   HDL/Cholesterol Ratio Latest Ref Range: 20.0 - 50.0 % 30.9   LDL Cholesterol External Latest Ref Range: 63.0 - 159.0 mg/dL 64.6   Non-HDL Cholesterol Latest Units: mg/dL 94  "  Total Cholesterol/HDL Ratio Latest Ref Range: 2.0 - 5.0  3.2   Triglycerides Latest Ref Range: 30 - 150 mg/dL 147     Review of Systems   Constitutional: Negative for fatigue.   Eyes: Negative for visual disturbance.   Respiratory: Negative for shortness of breath.    Cardiovascular: Negative for chest pain.   Gastrointestinal: Negative for abdominal pain.   Musculoskeletal: Negative for arthralgias.   Skin: Negative for wound.   Neurological: Negative for headaches.   Hematological: Does not bruise/bleed easily.   Psychiatric/Behavioral: Negative for sleep disturbance.     Objective:   Physical Exam  Vitals signs reviewed.   Constitutional:       Appearance: He is well-developed.   Neck:      Thyroid: No thyromegaly.   Cardiovascular:      Rate and Rhythm: Normal rate.   Pulmonary:      Effort: Pulmonary effort is normal.   Abdominal:      Palpations: Abdomen is soft.     injection sites are without edema or erythema.  Lipo hypertropthy to LLQ-encouraged rotation of sites.   Diabetes Foot Exam:   Denies sores or lesions to bilateral feet  Shoes appropriate  Deferred done in Sept 2020    Visit Vitals  /74   Ht 5' 8" (1.727 m)   Wt 112.4 kg (247 lb 11 oz)   BMI 37.66 kg/m²        Lab Review:   Lab Results   Component Value Date    HGBA1C 6.1 (H) 12/22/2020    HGBA1C 6.7 (H) 08/03/2020    HGBA1C 7.0 (H) 06/05/2020      Lab Results   Component Value Date    CHOL 136 08/04/2020    HDL 42 08/04/2020    LDLCALC 64.6 08/04/2020    TRIG 147 08/04/2020    CHOLHDL 30.9 08/04/2020     Lab Results   Component Value Date     12/22/2020    K 4.6 12/22/2020    CL 99 12/22/2020    CO2 31 (H) 12/22/2020     (H) 12/22/2020    BUN 21 12/22/2020    CREATININE 1.1 12/22/2020    CALCIUM 9.9 12/22/2020    PROT 8.0 12/22/2020    ALBUMIN 4.3 12/22/2020    BILITOT 0.3 12/22/2020    ALKPHOS 59 12/22/2020    AST 22 12/22/2020    ALT 32 12/22/2020    ANIONGAP 10 12/22/2020    ESTGFRAFRICA >60 12/22/2020    EGFRNONAA >60 " 12/22/2020    TSH 0.976 10/20/2020     No results found for: [id number]  Assessment and Plan     1. Type 2 diabetes mellitus with hyperglycemia, with long-term current use of insulin  TSH    Hemoglobin A1C    Comprehensive Metabolic Panel   2. Cerebrovascular accident (CVA), unspecified mechanism     3. Obesity, unspecified classification, unspecified obesity type, unspecified whether serious comorbidity present     4. Other specified hypothyroidism     5. Essential (primary) hypertension     6. Hyperlipidemia, unspecified hyperlipidemia type     7. Snoring  Ambulatory referral/consult to Sleep Disorders   8. Neuropathy  Vitamin B12   9. Drug-induced folate deficiency anemia   Vitamin B12       Type 2 diabetes mellitus with hyperglycemia, with long-term current use of insulin  Given recent CVA and known CAD would benefit from addition of medication with known CVD benefit  Will make the following changes:   Continue Metformin 500 mg twice daily   Continue Trulicity 0.75 mg weekly      Continue Jardiance 10 mg daily:     Check BG 2 times daily-2 times a week or if feeling badly. Call or message me for BGs above 180's    Consult sleep medicine for possible sleep apnea    Bring glucerna or protein bar with you when you go to the house to remodel    -- Reviewed goals of therapy are to get the best control we can without hypoglycemia  -- Advised frequent self blood glucose monitoring.  Patient encouraged to document glucose results and bring them to every clinic visit    -- Hypoglycemia precautions discussed. Instructed on precautions before driving.    -- Call for Bg repeatedly < 90 or > 180.   -- Close adherence to lifestyle changes recommended.   -- Periodic follow ups for eye evaluations, foot care and dental care suggested.  -- Given information on diabetic diet and hypoglycemia            Stroke  Glycemic control as above  Continue SGLT2 as above. 38% decreased risk of MACE and repeat stroke with  Jardiance.    Obesity  Continue GLP1 as above. Continue SGLT2 as above. Discussed dietary modification, refer to DM education    Other specified hypothyroidism  Continue LT4 150 mcg daily      Essential (primary) hypertension  BP at goal today    Hyperlipemia  Controlled  Cont statin    Neuropathy  -- Check Vitamin B 12 level    Drug-induced folate deficiency anemia   -- Check Vitamin B 12 level      Snoring  -- Consult sleep medicine         Follow up in about 6 months (around 6/28/2021).

## 2020-12-28 NOTE — ASSESSMENT & PLAN NOTE
Given recent CVA and known CAD would benefit from addition of medication with known CVD benefit  Will make the following changes:   Continue Metformin 500 mg twice daily   Continue Trulicity 0.75 mg weekly      Continue Jardiance 10 mg daily:     Check BG 2 times daily-2 times a week or if feeling badly. Call or message me for BGs above 180's    Consult sleep medicine for possible sleep apnea    Bring glucerna or protein bar with you when you go to the house to remodel    -- Reviewed goals of therapy are to get the best control we can without hypoglycemia  -- Advised frequent self blood glucose monitoring.  Patient encouraged to document glucose results and bring them to every clinic visit    -- Hypoglycemia precautions discussed. Instructed on precautions before driving.    -- Call for Bg repeatedly < 90 or > 180.   -- Close adherence to lifestyle changes recommended.   -- Periodic follow ups for eye evaluations, foot care and dental care suggested.  -- Given information on diabetic diet and hypoglycemia

## 2020-12-28 NOTE — PATIENT INSTRUCTIONS
Diabetes    Continue current medications: Metformin, Trulicity, and Jardiance   Check BG 2 times daily-2 times a week or if feeling badly. Call or message me for BGs above 180's    Consult sleep medicine for possible sleep apnea    Bring glucerna or protein bar with you when you go to the house to remodel    Snacks can be an important part of a balanced, healthy meal plan. They allow you to eat more frequently, feeling full and satisfied throughout the day. Also, they allow you to spread carbohydrates evenly, which may stabilize blood sugars.  Plus, snacks are enjoyable!     The amount of carbohydrate needed at snacks varies. Generally, about 15-30 grams of carbohydrate per snack is recommended.  Below you will find some tasty treats.       0-5 gm carb   Crystal Light   Vitamin Water Zero   Herbal tea, unsweetened   2 tsp peanut butter on celery   1./2 cup sugar-free jell-o   1 sugar-free popsicle   ¼ cup blueberries   8oz Blue Shannan unsweetened almond milk   5 baby carrots & celery sticks, cucumbers, bell peppers dipped in ¼ cup salsa, 2Tbsp light ranch dressing or 2Tbsp plain Greek yogurt   10 Goldfish crackers   ½ oz low-fat cheese or string cheese   1 closed handful of nuts, unsalted   1 Tbsp of sunflower seeds, unsalted   1 cup Smart Pop popcorn   1 whole grain brown rice cake        15 gm carb   1 small piece of fruit or ½ banana or 1/2 cup lite canned fruit   3 compa cracker squares   3 cups Smart Pop popcorn, top spray butter, [place] lite salt or cinnamon and Truvia   5 Vanilla Wafers   ½ cup low fat, no added sugar ice cream or frozen yogurt (Blue bell, Blue Bunny, Weight Watchers, Skinny Cow)   ½ turkey, ham, or chicken sandwich   ½ c fruit with ½ c Cottage cheese   4-6 unsalted wheat crackers with 1 oz low fat cheese or 1 tbsp peanut butter    30-45 goldfish crackers (depending on flavor)    7-8 Episcopal mini brown rice cakes (caramel, apple cinnamon, chocolate)    12 Episcopal  mini brown rice cakes (cheddar, bbq, ranch)    1/3 cup hummus dip with raw veg   1/2 whole wheat carina, 1Tbsp hummus   Mini Pizza (1/2 whole wheat English muffin, low-fat  cheese, tomato sauce)   100 calorie snack pack (Oreo, Chips Ahoy, Ritz Mix, Baked Cheetos)   4-6 oz. light or Greek Style yogurt (Chobani, Yoplait, Okios, [place])   ½ cup sugar-free pudding     6 in. wheat tortilla or carina oven toasted chips (topped with spray butter flavoring, cinnamon, Truvia OR spray butter, garlic powder, chili powder)    18 BBQ Popchips (available at Target, Whole Foods, Fresh Market)        Thyroid      Continue levothyroxine 150 mcg daily

## 2020-12-28 NOTE — ASSESSMENT & PLAN NOTE
Glycemic control as above  Continue SGLT2 as above. 38% decreased risk of MACE and repeat stroke with Jardiance.

## 2021-01-04 ENCOUNTER — HOME CARE VISIT (OUTPATIENT)
Dept: NEUROLOGY | Facility: HOSPITAL | Age: 66
End: 2021-01-04

## 2021-01-05 ENCOUNTER — PATIENT MESSAGE (OUTPATIENT)
Dept: ENDOCRINOLOGY | Facility: CLINIC | Age: 66
End: 2021-01-05

## 2021-01-06 ENCOUNTER — PATIENT MESSAGE (OUTPATIENT)
Dept: ENDOCRINOLOGY | Facility: CLINIC | Age: 66
End: 2021-01-06

## 2021-01-06 VITALS — OXYGEN SATURATION: 98 % | HEART RATE: 68 BPM | DIASTOLIC BLOOD PRESSURE: 66 MMHG | SYSTOLIC BLOOD PRESSURE: 124 MMHG

## 2021-01-13 ENCOUNTER — OFFICE VISIT (OUTPATIENT)
Dept: SLEEP MEDICINE | Facility: CLINIC | Age: 66
End: 2021-01-13
Payer: MEDICARE

## 2021-01-13 DIAGNOSIS — Z86.73 HISTORY OF ISCHEMIC MULTIFOCAL MULTIPLE VASCULAR TERRITORIES STROKE: ICD-10-CM

## 2021-01-13 DIAGNOSIS — G93.6 CYTOTOXIC CEREBRAL EDEMA: ICD-10-CM

## 2021-01-13 DIAGNOSIS — G47.39 OTHER SLEEP APNEA: ICD-10-CM

## 2021-01-13 DIAGNOSIS — I63.441 EMBOLIC STROKE INVOLVING RIGHT CEREBELLAR ARTERY: Primary | ICD-10-CM

## 2021-01-13 DIAGNOSIS — R06.83 SNORING: ICD-10-CM

## 2021-01-13 PROCEDURE — 99442 PR PHYSICIAN TELEPHONE EVALUATION 11-20 MIN: ICD-10-PCS | Mod: 95,,, | Performed by: INTERNAL MEDICINE

## 2021-01-13 PROCEDURE — 99442 PR PHYSICIAN TELEPHONE EVALUATION 11-20 MIN: CPT | Mod: 95,,, | Performed by: INTERNAL MEDICINE

## 2021-01-14 ENCOUNTER — OFFICE VISIT (OUTPATIENT)
Dept: NEUROLOGY | Facility: CLINIC | Age: 66
End: 2021-01-14
Payer: MEDICARE

## 2021-01-14 VITALS
WEIGHT: 250.56 LBS | BODY MASS INDEX: 37.97 KG/M2 | DIASTOLIC BLOOD PRESSURE: 78 MMHG | HEART RATE: 69 BPM | SYSTOLIC BLOOD PRESSURE: 128 MMHG | HEIGHT: 68 IN

## 2021-01-14 DIAGNOSIS — I69.398 HOMONYMOUS HEMIANOPSIA FOLLOWING CEREBROVASCULAR ACCIDENT: ICD-10-CM

## 2021-01-14 DIAGNOSIS — I10 ESSENTIAL (PRIMARY) HYPERTENSION: ICD-10-CM

## 2021-01-14 DIAGNOSIS — I63.441 EMBOLIC STROKE INVOLVING RIGHT CEREBELLAR ARTERY: ICD-10-CM

## 2021-01-14 DIAGNOSIS — H53.469 HOMONYMOUS HEMIANOPSIA FOLLOWING CEREBROVASCULAR ACCIDENT: ICD-10-CM

## 2021-01-14 DIAGNOSIS — Z79.4 TYPE 2 DIABETES MELLITUS WITH HYPERGLYCEMIA, WITH LONG-TERM CURRENT USE OF INSULIN: ICD-10-CM

## 2021-01-14 DIAGNOSIS — E78.5 HYPERLIPIDEMIA, UNSPECIFIED HYPERLIPIDEMIA TYPE: ICD-10-CM

## 2021-01-14 DIAGNOSIS — Z86.73 CHRONIC ISCHEMIC RIGHT PCA STROKE: Primary | ICD-10-CM

## 2021-01-14 DIAGNOSIS — Z86.73 CHRONIC ISCHEMIC LEFT POSTERIOR CEREBRAL ARTERY (PCA) STROKE: ICD-10-CM

## 2021-01-14 DIAGNOSIS — I25.10 CORONARY ARTERY DISEASE, ANGINA PRESENCE UNSPECIFIED, UNSPECIFIED VESSEL OR LESION TYPE, UNSPECIFIED WHETHER NATIVE OR TRANSPLANTED HEART: ICD-10-CM

## 2021-01-14 DIAGNOSIS — Z86.73 HISTORY OF ISCHEMIC MULTIFOCAL MULTIPLE VASCULAR TERRITORIES STROKE: ICD-10-CM

## 2021-01-14 DIAGNOSIS — E11.65 TYPE 2 DIABETES MELLITUS WITH HYPERGLYCEMIA, WITH LONG-TERM CURRENT USE OF INSULIN: ICD-10-CM

## 2021-01-14 DIAGNOSIS — I63.542 CEREBRAL INFARCTION INVOLVING LEFT CEREBELLAR ARTERY: ICD-10-CM

## 2021-01-14 DIAGNOSIS — R06.83 SNORING: ICD-10-CM

## 2021-01-14 DIAGNOSIS — H53.462 HEMIANOPIA OF LEFT EYE: ICD-10-CM

## 2021-01-14 DIAGNOSIS — E66.9 OBESITY, UNSPECIFIED CLASSIFICATION, UNSPECIFIED OBESITY TYPE, UNSPECIFIED WHETHER SERIOUS COMORBIDITY PRESENT: ICD-10-CM

## 2021-01-14 DIAGNOSIS — E03.8 OTHER SPECIFIED HYPOTHYROIDISM: ICD-10-CM

## 2021-01-14 DIAGNOSIS — I63.541 CEREBRAL INFARCTION INVOLVING RIGHT CEREBELLAR ARTERY: ICD-10-CM

## 2021-01-14 PROCEDURE — 99214 OFFICE O/P EST MOD 30 MIN: CPT | Mod: PBBFAC | Performed by: STUDENT IN AN ORGANIZED HEALTH CARE EDUCATION/TRAINING PROGRAM

## 2021-01-14 PROCEDURE — 99213 OFFICE O/P EST LOW 20 MIN: CPT | Mod: S$PBB,GC,, | Performed by: PSYCHIATRY & NEUROLOGY

## 2021-01-14 PROCEDURE — 99999 PR PBB SHADOW E&M-EST. PATIENT-LVL IV: CPT | Mod: PBBFAC,GC,, | Performed by: STUDENT IN AN ORGANIZED HEALTH CARE EDUCATION/TRAINING PROGRAM

## 2021-01-14 PROCEDURE — 99999 PR PBB SHADOW E&M-EST. PATIENT-LVL IV: ICD-10-PCS | Mod: PBBFAC,GC,, | Performed by: STUDENT IN AN ORGANIZED HEALTH CARE EDUCATION/TRAINING PROGRAM

## 2021-01-14 PROCEDURE — 99213 PR OFFICE/OUTPT VISIT, EST, LEVL III, 20-29 MIN: ICD-10-PCS | Mod: S$PBB,GC,, | Performed by: PSYCHIATRY & NEUROLOGY

## 2021-02-04 ENCOUNTER — HOSPITAL ENCOUNTER (OUTPATIENT)
Dept: SLEEP MEDICINE | Facility: HOSPITAL | Age: 66
Discharge: HOME | End: 2021-02-04
Attending: INTERNAL MEDICINE
Payer: MEDICARE

## 2021-02-04 DIAGNOSIS — R06.83 SNORING: ICD-10-CM

## 2021-02-04 DIAGNOSIS — Z86.73 HISTORY OF ISCHEMIC MULTIFOCAL MULTIPLE VASCULAR TERRITORIES STROKE: ICD-10-CM

## 2021-02-04 DIAGNOSIS — G93.6 CYTOTOXIC CEREBRAL EDEMA: ICD-10-CM

## 2021-02-04 DIAGNOSIS — I63.441 EMBOLIC STROKE INVOLVING RIGHT CEREBELLAR ARTERY: ICD-10-CM

## 2021-02-04 DIAGNOSIS — G47.39 OTHER SLEEP APNEA: ICD-10-CM

## 2021-02-05 ENCOUNTER — HOME CARE VISIT (OUTPATIENT)
Dept: NEUROLOGY | Facility: HOSPITAL | Age: 66
End: 2021-02-05

## 2021-03-08 ENCOUNTER — HOME CARE VISIT (OUTPATIENT)
Dept: NEUROLOGY | Facility: HOSPITAL | Age: 66
End: 2021-03-08

## 2021-04-09 ENCOUNTER — HOME CARE VISIT (OUTPATIENT)
Dept: NEUROLOGY | Facility: HOSPITAL | Age: 66
End: 2021-04-09

## 2021-04-16 ENCOUNTER — PATIENT MESSAGE (OUTPATIENT)
Dept: RESEARCH | Facility: HOSPITAL | Age: 66
End: 2021-04-16

## 2021-05-18 ENCOUNTER — HOME CARE VISIT (OUTPATIENT)
Dept: NEUROLOGY | Facility: HOSPITAL | Age: 66
End: 2021-05-18

## 2021-06-01 ENCOUNTER — PATIENT MESSAGE (OUTPATIENT)
Dept: ENDOCRINOLOGY | Facility: CLINIC | Age: 66
End: 2021-06-01

## 2021-06-01 ENCOUNTER — LAB VISIT (OUTPATIENT)
Dept: LAB | Facility: HOSPITAL | Age: 66
End: 2021-06-01
Attending: NURSE PRACTITIONER
Payer: MEDICARE

## 2021-06-01 DIAGNOSIS — Z79.4 TYPE 2 DIABETES MELLITUS WITH HYPERGLYCEMIA, WITH LONG-TERM CURRENT USE OF INSULIN: ICD-10-CM

## 2021-06-01 DIAGNOSIS — E11.65 TYPE 2 DIABETES MELLITUS WITH HYPERGLYCEMIA, WITH LONG-TERM CURRENT USE OF INSULIN: ICD-10-CM

## 2021-06-01 DIAGNOSIS — G62.9 NEUROPATHY: ICD-10-CM

## 2021-06-01 DIAGNOSIS — D52.1 DRUG-INDUCED FOLATE DEFICIENCY ANEMIA: ICD-10-CM

## 2021-06-01 LAB
ALBUMIN SERPL BCP-MCNC: 4.1 G/DL (ref 3.5–5.2)
ALP SERPL-CCNC: 52 U/L (ref 55–135)
ALT SERPL W/O P-5'-P-CCNC: 26 U/L (ref 10–44)
ANION GAP SERPL CALC-SCNC: 12 MMOL/L (ref 8–16)
AST SERPL-CCNC: 18 U/L (ref 10–40)
BILIRUB SERPL-MCNC: 0.4 MG/DL (ref 0.1–1)
BUN SERPL-MCNC: 21 MG/DL (ref 8–23)
CALCIUM SERPL-MCNC: 10.3 MG/DL (ref 8.7–10.5)
CHLORIDE SERPL-SCNC: 102 MMOL/L (ref 95–110)
CO2 SERPL-SCNC: 26 MMOL/L (ref 23–29)
CREAT SERPL-MCNC: 1.2 MG/DL (ref 0.5–1.4)
EST. GFR  (AFRICAN AMERICAN): >60 ML/MIN/1.73 M^2
EST. GFR  (NON AFRICAN AMERICAN): >60 ML/MIN/1.73 M^2
ESTIMATED AVG GLUCOSE: 131 MG/DL (ref 68–131)
GLUCOSE SERPL-MCNC: 114 MG/DL (ref 70–110)
HBA1C MFR BLD: 6.2 % (ref 4–5.6)
POTASSIUM SERPL-SCNC: 4.8 MMOL/L (ref 3.5–5.1)
PROT SERPL-MCNC: 7.7 G/DL (ref 6–8.4)
SODIUM SERPL-SCNC: 140 MMOL/L (ref 136–145)
T4 FREE SERPL-MCNC: 0.95 NG/DL (ref 0.71–1.51)
TSH SERPL DL<=0.005 MIU/L-ACNC: 11.19 UIU/ML (ref 0.4–4)

## 2021-06-01 PROCEDURE — 84439 ASSAY OF FREE THYROXINE: CPT | Performed by: NURSE PRACTITIONER

## 2021-06-01 PROCEDURE — 83036 HEMOGLOBIN GLYCOSYLATED A1C: CPT | Performed by: NURSE PRACTITIONER

## 2021-06-01 PROCEDURE — 82607 VITAMIN B-12: CPT | Performed by: NURSE PRACTITIONER

## 2021-06-01 PROCEDURE — 80053 COMPREHEN METABOLIC PANEL: CPT | Performed by: NURSE PRACTITIONER

## 2021-06-01 PROCEDURE — 36415 COLL VENOUS BLD VENIPUNCTURE: CPT | Mod: PN | Performed by: NURSE PRACTITIONER

## 2021-06-01 PROCEDURE — 84443 ASSAY THYROID STIM HORMONE: CPT | Performed by: NURSE PRACTITIONER

## 2021-06-02 LAB — VIT B12 SERPL-MCNC: 1309 PG/ML (ref 210–950)

## 2021-06-07 ENCOUNTER — TELEPHONE (OUTPATIENT)
Dept: ENDOCRINOLOGY | Facility: CLINIC | Age: 66
End: 2021-06-07

## 2021-06-18 DIAGNOSIS — Z79.4 TYPE 2 DIABETES MELLITUS WITH HYPERGLYCEMIA, WITH LONG-TERM CURRENT USE OF INSULIN: ICD-10-CM

## 2021-06-18 DIAGNOSIS — E11.65 TYPE 2 DIABETES MELLITUS WITH HYPERGLYCEMIA, WITH LONG-TERM CURRENT USE OF INSULIN: ICD-10-CM

## 2021-06-18 RX ORDER — EMPAGLIFLOZIN 10 MG/1
10 TABLET, FILM COATED ORAL DAILY
Qty: 30 TABLET | Refills: 4 | Status: SHIPPED | OUTPATIENT
Start: 2021-06-18 | End: 2022-03-16

## 2021-06-21 ENCOUNTER — HOME CARE VISIT (OUTPATIENT)
Dept: NEUROLOGY | Facility: HOSPITAL | Age: 66
End: 2021-06-21

## 2021-06-21 VITALS
OXYGEN SATURATION: 99 % | BODY MASS INDEX: 37.01 KG/M2 | WEIGHT: 243.38 LBS | SYSTOLIC BLOOD PRESSURE: 122 MMHG | DIASTOLIC BLOOD PRESSURE: 70 MMHG | HEART RATE: 70 BPM

## 2021-07-21 ENCOUNTER — HOME CARE VISIT (OUTPATIENT)
Dept: NEUROLOGY | Facility: HOSPITAL | Age: 66
End: 2021-07-21
Payer: MEDICARE

## 2021-07-21 VITALS
BODY MASS INDEX: 35.97 KG/M2 | SYSTOLIC BLOOD PRESSURE: 126 MMHG | DIASTOLIC BLOOD PRESSURE: 78 MMHG | OXYGEN SATURATION: 98 % | WEIGHT: 236.63 LBS | HEART RATE: 70 BPM

## 2021-08-23 ENCOUNTER — PATIENT MESSAGE (OUTPATIENT)
Dept: ENDOCRINOLOGY | Facility: CLINIC | Age: 66
End: 2021-08-23

## 2021-09-24 ENCOUNTER — OFFICE VISIT (OUTPATIENT)
Dept: URGENT CARE | Facility: CLINIC | Age: 66
End: 2021-09-24
Payer: MEDICARE

## 2021-09-24 VITALS
DIASTOLIC BLOOD PRESSURE: 74 MMHG | HEART RATE: 70 BPM | SYSTOLIC BLOOD PRESSURE: 132 MMHG | RESPIRATION RATE: 18 BRPM | WEIGHT: 235 LBS | BODY MASS INDEX: 35.61 KG/M2 | HEIGHT: 68 IN | OXYGEN SATURATION: 91 % | TEMPERATURE: 99 F

## 2021-09-24 DIAGNOSIS — J18.9 PNEUMONIA OF RIGHT LOWER LOBE DUE TO INFECTIOUS ORGANISM: ICD-10-CM

## 2021-09-24 DIAGNOSIS — R06.02 SOB (SHORTNESS OF BREATH): ICD-10-CM

## 2021-09-24 DIAGNOSIS — R05.9 COUGH: Primary | ICD-10-CM

## 2021-09-24 DIAGNOSIS — U07.1 COVID-19: ICD-10-CM

## 2021-09-24 DIAGNOSIS — U07.1 COVID-19 VIRUS DETECTED: ICD-10-CM

## 2021-09-24 LAB
CTP QC/QA: YES
SARS-COV-2 RDRP RESP QL NAA+PROBE: POSITIVE

## 2021-09-24 PROCEDURE — 71046 XR CHEST PA AND LATERAL: ICD-10-PCS | Mod: S$GLB,,, | Performed by: RADIOLOGY

## 2021-09-24 PROCEDURE — 71046 X-RAY EXAM CHEST 2 VIEWS: CPT | Mod: S$GLB,,, | Performed by: RADIOLOGY

## 2021-09-24 PROCEDURE — 99214 OFFICE O/P EST MOD 30 MIN: CPT | Mod: CR,S$GLB,,

## 2021-09-24 PROCEDURE — U0002 COVID-19 LAB TEST NON-CDC: HCPCS | Mod: QW,CR,S$GLB,

## 2021-09-24 PROCEDURE — 99214 PR OFFICE/OUTPT VISIT, EST, LEVL IV, 30-39 MIN: ICD-10-PCS | Mod: CR,S$GLB,,

## 2021-09-24 PROCEDURE — U0002: ICD-10-PCS | Mod: QW,CR,S$GLB,

## 2021-09-24 RX ORDER — IPRATROPIUM BROMIDE 42 UG/1
2 SPRAY, METERED NASAL 4 TIMES DAILY
Qty: 6 ML | Refills: 0 | Status: SHIPPED | OUTPATIENT
Start: 2021-09-24 | End: 2021-10-24

## 2021-09-24 RX ORDER — AZITHROMYCIN 250 MG/1
TABLET, FILM COATED ORAL
Qty: 6 TABLET | Refills: 0 | Status: SHIPPED | OUTPATIENT
Start: 2021-09-24 | End: 2021-09-29

## 2021-09-24 RX ORDER — BENZONATATE 200 MG/1
200 CAPSULE ORAL 3 TIMES DAILY PRN
Qty: 30 CAPSULE | Refills: 0 | Status: SHIPPED | OUTPATIENT
Start: 2021-09-24 | End: 2021-10-04

## 2021-11-10 DIAGNOSIS — Z79.4 TYPE 2 DIABETES MELLITUS WITH HYPERGLYCEMIA, WITH LONG-TERM CURRENT USE OF INSULIN: ICD-10-CM

## 2021-11-10 DIAGNOSIS — E11.65 TYPE 2 DIABETES MELLITUS WITH HYPERGLYCEMIA, WITH LONG-TERM CURRENT USE OF INSULIN: ICD-10-CM

## 2021-11-10 RX ORDER — DULAGLUTIDE 0.75 MG/.5ML
INJECTION, SOLUTION SUBCUTANEOUS
Qty: 4 PEN | Refills: 0 | Status: SHIPPED | OUTPATIENT
Start: 2021-11-10 | End: 2021-12-03

## 2021-11-30 ENCOUNTER — OFFICE VISIT (OUTPATIENT)
Dept: ENDOCRINOLOGY | Facility: CLINIC | Age: 66
End: 2021-11-30
Payer: MEDICARE

## 2021-11-30 ENCOUNTER — PATIENT MESSAGE (OUTPATIENT)
Dept: ENDOCRINOLOGY | Facility: CLINIC | Age: 66
End: 2021-11-30

## 2021-11-30 ENCOUNTER — LAB VISIT (OUTPATIENT)
Dept: LAB | Facility: HOSPITAL | Age: 66
End: 2021-11-30
Payer: MEDICARE

## 2021-11-30 VITALS
HEIGHT: 68 IN | WEIGHT: 252.88 LBS | BODY MASS INDEX: 38.32 KG/M2 | DIASTOLIC BLOOD PRESSURE: 75 MMHG | SYSTOLIC BLOOD PRESSURE: 130 MMHG

## 2021-11-30 DIAGNOSIS — E11.65 TYPE 2 DIABETES MELLITUS WITH HYPERGLYCEMIA, WITH LONG-TERM CURRENT USE OF INSULIN: Primary | ICD-10-CM

## 2021-11-30 DIAGNOSIS — Z79.4 TYPE 2 DIABETES MELLITUS WITH HYPERGLYCEMIA, WITH LONG-TERM CURRENT USE OF INSULIN: Primary | ICD-10-CM

## 2021-11-30 DIAGNOSIS — E66.9 OBESITY, UNSPECIFIED CLASSIFICATION, UNSPECIFIED OBESITY TYPE, UNSPECIFIED WHETHER SERIOUS COMORBIDITY PRESENT: ICD-10-CM

## 2021-11-30 DIAGNOSIS — E03.8 OTHER SPECIFIED HYPOTHYROIDISM: ICD-10-CM

## 2021-11-30 DIAGNOSIS — E11.65 TYPE 2 DIABETES MELLITUS WITH HYPERGLYCEMIA, WITH LONG-TERM CURRENT USE OF INSULIN: ICD-10-CM

## 2021-11-30 DIAGNOSIS — E55.9 VITAMIN D DEFICIENCY: ICD-10-CM

## 2021-11-30 DIAGNOSIS — D52.1 DRUG-INDUCED FOLATE DEFICIENCY ANEMIA: ICD-10-CM

## 2021-11-30 DIAGNOSIS — R06.83 SNORING: ICD-10-CM

## 2021-11-30 DIAGNOSIS — E78.5 HYPERLIPIDEMIA, UNSPECIFIED HYPERLIPIDEMIA TYPE: ICD-10-CM

## 2021-11-30 DIAGNOSIS — E83.52 HYPERCALCEMIA: ICD-10-CM

## 2021-11-30 DIAGNOSIS — Z79.4 TYPE 2 DIABETES MELLITUS WITH HYPERGLYCEMIA, WITH LONG-TERM CURRENT USE OF INSULIN: ICD-10-CM

## 2021-11-30 DIAGNOSIS — I10 ESSENTIAL (PRIMARY) HYPERTENSION: ICD-10-CM

## 2021-11-30 DIAGNOSIS — I63.9 CEREBROVASCULAR ACCIDENT (CVA), UNSPECIFIED MECHANISM: Chronic | ICD-10-CM

## 2021-11-30 LAB
25(OH)D3+25(OH)D2 SERPL-MCNC: 61 NG/ML (ref 30–96)
ALBUMIN SERPL BCP-MCNC: 4.5 G/DL (ref 3.5–5.2)
ALP SERPL-CCNC: 55 U/L (ref 55–135)
ALT SERPL W/O P-5'-P-CCNC: 34 U/L (ref 10–44)
ANION GAP SERPL CALC-SCNC: 13 MMOL/L (ref 8–16)
AST SERPL-CCNC: 19 U/L (ref 10–40)
BILIRUB SERPL-MCNC: 0.4 MG/DL (ref 0.1–1)
BUN SERPL-MCNC: 18 MG/DL (ref 8–23)
CALCIUM SERPL-MCNC: 11.2 MG/DL (ref 8.7–10.5)
CHLORIDE SERPL-SCNC: 98 MMOL/L (ref 95–110)
CHOLEST SERPL-MCNC: 145 MG/DL (ref 120–199)
CHOLEST/HDLC SERPL: 2.5 {RATIO} (ref 2–5)
CO2 SERPL-SCNC: 29 MMOL/L (ref 23–29)
CREAT SERPL-MCNC: 0.9 MG/DL (ref 0.5–1.4)
EST. GFR  (AFRICAN AMERICAN): >60 ML/MIN/1.73 M^2
EST. GFR  (NON AFRICAN AMERICAN): >60 ML/MIN/1.73 M^2
ESTIMATED AVG GLUCOSE: 134 MG/DL (ref 68–131)
GLUCOSE SERPL-MCNC: 119 MG/DL (ref 70–110)
HBA1C MFR BLD: 6.3 % (ref 4–5.6)
HDLC SERPL-MCNC: 59 MG/DL (ref 40–75)
HDLC SERPL: 40.7 % (ref 20–50)
LDLC SERPL CALC-MCNC: 62.6 MG/DL (ref 63–159)
NONHDLC SERPL-MCNC: 86 MG/DL
POTASSIUM SERPL-SCNC: 4.4 MMOL/L (ref 3.5–5.1)
PROT SERPL-MCNC: 7.9 G/DL (ref 6–8.4)
SODIUM SERPL-SCNC: 140 MMOL/L (ref 136–145)
T4 FREE SERPL-MCNC: 0.94 NG/DL (ref 0.71–1.51)
TRIGL SERPL-MCNC: 117 MG/DL (ref 30–150)
TSH SERPL DL<=0.005 MIU/L-ACNC: 10.24 UIU/ML (ref 0.4–4)

## 2021-11-30 PROCEDURE — 84443 ASSAY THYROID STIM HORMONE: CPT | Performed by: NURSE PRACTITIONER

## 2021-11-30 PROCEDURE — 80061 LIPID PANEL: CPT | Performed by: NURSE PRACTITIONER

## 2021-11-30 PROCEDURE — 99214 OFFICE O/P EST MOD 30 MIN: CPT | Mod: S$PBB,,, | Performed by: NURSE PRACTITIONER

## 2021-11-30 PROCEDURE — 80053 COMPREHEN METABOLIC PANEL: CPT | Performed by: NURSE PRACTITIONER

## 2021-11-30 PROCEDURE — 99999 PR PBB SHADOW E&M-EST. PATIENT-LVL IV: CPT | Mod: PBBFAC,,, | Performed by: NURSE PRACTITIONER

## 2021-11-30 PROCEDURE — 82306 VITAMIN D 25 HYDROXY: CPT | Performed by: NURSE PRACTITIONER

## 2021-11-30 PROCEDURE — 84439 ASSAY OF FREE THYROXINE: CPT | Performed by: NURSE PRACTITIONER

## 2021-11-30 PROCEDURE — 99214 OFFICE O/P EST MOD 30 MIN: CPT | Mod: PBBFAC | Performed by: NURSE PRACTITIONER

## 2021-11-30 PROCEDURE — 83036 HEMOGLOBIN GLYCOSYLATED A1C: CPT | Performed by: NURSE PRACTITIONER

## 2021-11-30 PROCEDURE — 99214 PR OFFICE/OUTPT VISIT, EST, LEVL IV, 30-39 MIN: ICD-10-PCS | Mod: S$PBB,,, | Performed by: NURSE PRACTITIONER

## 2021-11-30 PROCEDURE — 99999 PR PBB SHADOW E&M-EST. PATIENT-LVL IV: ICD-10-PCS | Mod: PBBFAC,,, | Performed by: NURSE PRACTITIONER

## 2021-11-30 PROCEDURE — 36415 COLL VENOUS BLD VENIPUNCTURE: CPT | Performed by: NURSE PRACTITIONER

## 2021-11-30 RX ORDER — ATORVASTATIN CALCIUM 40 MG/1
40 TABLET, FILM COATED ORAL DAILY
Qty: 90 TABLET | Refills: 3 | Status: SHIPPED | OUTPATIENT
Start: 2021-11-30 | End: 2022-11-30

## 2021-11-30 RX ORDER — LEVOTHYROXINE SODIUM 175 UG/1
175 TABLET ORAL
Qty: 30 TABLET | Refills: 11 | Status: SHIPPED | OUTPATIENT
Start: 2021-11-30 | End: 2022-08-12 | Stop reason: SDUPTHER

## 2021-11-30 RX ORDER — METFORMIN HYDROCHLORIDE 500 MG/1
500 TABLET, EXTENDED RELEASE ORAL 2 TIMES DAILY WITH MEALS
Qty: 360 TABLET | Refills: 3 | Status: SHIPPED | OUTPATIENT
Start: 2021-11-30 | End: 2023-01-03 | Stop reason: SDUPTHER

## 2021-12-01 ENCOUNTER — HOSPITAL ENCOUNTER (OUTPATIENT)
Dept: RADIOLOGY | Facility: HOSPITAL | Age: 66
Discharge: HOME | End: 2021-12-01
Attending: NURSE PRACTITIONER
Payer: MEDICARE

## 2021-12-01 ENCOUNTER — PATIENT MESSAGE (OUTPATIENT)
Dept: ENDOCRINOLOGY | Facility: CLINIC | Age: 66
End: 2021-12-01
Payer: MEDICARE

## 2021-12-01 DIAGNOSIS — E03.8 OTHER SPECIFIED HYPOTHYROIDISM: ICD-10-CM

## 2021-12-01 PROCEDURE — 76536 US EXAM OF HEAD AND NECK: CPT | Mod: 26,,, | Performed by: RADIOLOGY

## 2021-12-01 PROCEDURE — 76536 US SOFT TISSUE HEAD NECK THYROID: ICD-10-PCS | Mod: 26,,, | Performed by: RADIOLOGY

## 2021-12-01 PROCEDURE — 76536 US EXAM OF HEAD AND NECK: CPT | Mod: TC

## 2021-12-09 ENCOUNTER — PATIENT MESSAGE (OUTPATIENT)
Dept: ENDOCRINOLOGY | Facility: CLINIC | Age: 66
End: 2021-12-09
Payer: MEDICARE

## 2021-12-09 ENCOUNTER — LAB VISIT (OUTPATIENT)
Dept: LAB | Facility: HOSPITAL | Age: 66
End: 2021-12-09
Attending: NURSE PRACTITIONER
Payer: MEDICARE

## 2021-12-09 DIAGNOSIS — E83.52 HYPERCALCEMIA: ICD-10-CM

## 2021-12-09 LAB
ALBUMIN SERPL BCP-MCNC: 4 G/DL (ref 3.5–5.2)
ANION GAP SERPL CALC-SCNC: 9 MMOL/L (ref 8–16)
BUN SERPL-MCNC: 23 MG/DL (ref 8–23)
CALCIUM SERPL-MCNC: 10.4 MG/DL (ref 8.7–10.5)
CHLORIDE SERPL-SCNC: 101 MMOL/L (ref 95–110)
CO2 SERPL-SCNC: 30 MMOL/L (ref 23–29)
CREAT SERPL-MCNC: 1 MG/DL (ref 0.5–1.4)
EST. GFR  (AFRICAN AMERICAN): >60 ML/MIN/1.73 M^2
EST. GFR  (NON AFRICAN AMERICAN): >60 ML/MIN/1.73 M^2
GLUCOSE SERPL-MCNC: 127 MG/DL (ref 70–110)
PHOSPHATE SERPL-MCNC: 3.8 MG/DL (ref 2.7–4.5)
POTASSIUM SERPL-SCNC: 4.4 MMOL/L (ref 3.5–5.1)
SODIUM SERPL-SCNC: 140 MMOL/L (ref 136–145)

## 2021-12-09 PROCEDURE — 36415 COLL VENOUS BLD VENIPUNCTURE: CPT | Performed by: NURSE PRACTITIONER

## 2021-12-09 PROCEDURE — 80069 RENAL FUNCTION PANEL: CPT | Performed by: NURSE PRACTITIONER

## 2022-01-11 ENCOUNTER — PATIENT MESSAGE (OUTPATIENT)
Dept: ENDOCRINOLOGY | Facility: CLINIC | Age: 67
End: 2022-01-11
Payer: MEDICARE

## 2022-01-11 ENCOUNTER — LAB VISIT (OUTPATIENT)
Dept: LAB | Facility: HOSPITAL | Age: 67
End: 2022-01-11
Attending: NURSE PRACTITIONER
Payer: MEDICARE

## 2022-01-11 DIAGNOSIS — E03.8 OTHER SPECIFIED HYPOTHYROIDISM: ICD-10-CM

## 2022-01-11 LAB — TSH SERPL DL<=0.005 MIU/L-ACNC: 2.6 UIU/ML (ref 0.4–4)

## 2022-01-11 PROCEDURE — 36415 COLL VENOUS BLD VENIPUNCTURE: CPT | Performed by: NURSE PRACTITIONER

## 2022-01-11 PROCEDURE — 84443 ASSAY THYROID STIM HORMONE: CPT | Performed by: NURSE PRACTITIONER

## 2022-01-11 NOTE — TELEPHONE ENCOUNTER
Results for JULIO CESAR CONTEH (MRN [id number]) as of 1/11/2022 12:39   Ref. Range 1/11/2022 09:03   TSH Latest Ref Range: 0.400 - 4.000 uIU/mL 2.604   Please increase the levothyroxine to 175 mcg daily

## 2022-08-12 ENCOUNTER — PATIENT MESSAGE (OUTPATIENT)
Dept: ENDOCRINOLOGY | Facility: CLINIC | Age: 67
End: 2022-08-12

## 2022-08-12 ENCOUNTER — OFFICE VISIT (OUTPATIENT)
Dept: ENDOCRINOLOGY | Facility: CLINIC | Age: 67
End: 2022-08-12
Payer: MEDICARE

## 2022-08-12 VITALS
BODY MASS INDEX: 37.1 KG/M2 | HEART RATE: 58 BPM | HEIGHT: 68 IN | WEIGHT: 244.81 LBS | DIASTOLIC BLOOD PRESSURE: 76 MMHG | OXYGEN SATURATION: 95 % | SYSTOLIC BLOOD PRESSURE: 130 MMHG

## 2022-08-12 DIAGNOSIS — Z79.4 TYPE 2 DIABETES MELLITUS WITH HYPERGLYCEMIA, WITH LONG-TERM CURRENT USE OF INSULIN: Primary | ICD-10-CM

## 2022-08-12 DIAGNOSIS — I63.9 CEREBROVASCULAR ACCIDENT (CVA), UNSPECIFIED MECHANISM: Chronic | ICD-10-CM

## 2022-08-12 DIAGNOSIS — I25.10 CORONARY ARTERY DISEASE, UNSPECIFIED VESSEL OR LESION TYPE, UNSPECIFIED WHETHER ANGINA PRESENT, UNSPECIFIED WHETHER NATIVE OR TRANSPLANTED HEART: ICD-10-CM

## 2022-08-12 DIAGNOSIS — E66.9 OBESITY, UNSPECIFIED CLASSIFICATION, UNSPECIFIED OBESITY TYPE, UNSPECIFIED WHETHER SERIOUS COMORBIDITY PRESENT: ICD-10-CM

## 2022-08-12 DIAGNOSIS — E03.8 OTHER SPECIFIED HYPOTHYROIDISM: ICD-10-CM

## 2022-08-12 DIAGNOSIS — I10 ESSENTIAL (PRIMARY) HYPERTENSION: Chronic | ICD-10-CM

## 2022-08-12 DIAGNOSIS — E78.5 HYPERLIPIDEMIA, UNSPECIFIED HYPERLIPIDEMIA TYPE: Chronic | ICD-10-CM

## 2022-08-12 DIAGNOSIS — E11.65 TYPE 2 DIABETES MELLITUS WITH HYPERGLYCEMIA, WITH LONG-TERM CURRENT USE OF INSULIN: Primary | ICD-10-CM

## 2022-08-12 PROCEDURE — 99214 PR OFFICE/OUTPT VISIT, EST, LEVL IV, 30-39 MIN: ICD-10-PCS | Mod: S$PBB,,, | Performed by: INTERNAL MEDICINE

## 2022-08-12 PROCEDURE — 99214 OFFICE O/P EST MOD 30 MIN: CPT | Mod: PBBFAC | Performed by: INTERNAL MEDICINE

## 2022-08-12 PROCEDURE — 99999 PR PBB SHADOW E&M-EST. PATIENT-LVL IV: CPT | Mod: PBBFAC,,, | Performed by: INTERNAL MEDICINE

## 2022-08-12 PROCEDURE — 99214 OFFICE O/P EST MOD 30 MIN: CPT | Mod: S$PBB,,, | Performed by: INTERNAL MEDICINE

## 2022-08-12 PROCEDURE — 99999 PR PBB SHADOW E&M-EST. PATIENT-LVL IV: ICD-10-PCS | Mod: PBBFAC,,, | Performed by: INTERNAL MEDICINE

## 2022-08-12 RX ORDER — DULAGLUTIDE 1.5 MG/.5ML
1.5 INJECTION, SOLUTION SUBCUTANEOUS WEEKLY
Qty: 4 PEN | Refills: 11 | Status: SHIPPED | OUTPATIENT
Start: 2022-08-12 | End: 2023-07-07

## 2022-08-12 RX ORDER — LEVOTHYROXINE SODIUM 175 UG/1
175 TABLET ORAL
Qty: 32 TABLET | Refills: 11 | Status: SHIPPED | OUTPATIENT
Start: 2022-08-12 | End: 2023-08-30

## 2022-08-12 RX ORDER — CALCIUM CARBONATE/VITAMIN D3 500-10/5ML
400 LIQUID (ML) ORAL ONCE
COMMUNITY
End: 2023-10-19

## 2022-08-12 RX ORDER — EMPAGLIFLOZIN 10 MG/1
10 TABLET, FILM COATED ORAL DAILY
Qty: 30 TABLET | Refills: 11 | Status: SHIPPED | OUTPATIENT
Start: 2022-08-12 | End: 2023-08-25

## 2022-08-12 RX ORDER — CALCIUM CITRATE/VITAMIN D3 200MG-6.25
TABLET ORAL 2 TIMES DAILY
COMMUNITY
Start: 2022-03-18

## 2022-08-12 NOTE — ASSESSMENT & PLAN NOTE
Update TSH today  Cont LT4 175 mcg with dose titration pending results  Goal normal TSH, avoid iatrogenic hyperthyroidism

## 2022-08-12 NOTE — ASSESSMENT & PLAN NOTE
Blood sugars on log are at goal  Will increase Trulicity for further benefit as very low risk hypoglycemia     Metformin 500 mg twice a day  Trulicity 1.5 mg once a week  Jardiance 10 mg daily    Update labs today    Discussed one pen can stay out of the refrigerator for 14 days. Please take Trulicity pen out of the refrigerator 10 minutes prior to injection.     Due for eye exam  Cont to monitor glucose twice daily

## 2022-08-12 NOTE — PROGRESS NOTES
Subjective:      Patient ID: Lorena Rodriguez is a 67 y.o. male.    Chief Complaint:  Hypothyroidism and Diabetes    History of Present Illness  Lorena Rodriguez with Type 2 diabetes complicated by CAD and CVA, HTN, HLD presents today for follow up of Type 2 Diabetes and hypothyroidism.     We first met patient in Aug 2020 whereas he was having multiple TIAs/CVA he is here to discern whether blood sugar may be contributing. He has checked glucose during some episodes and it has been in the 200-300's.  Has seen neurology and learned he had one large stroke over 5 episodes in 6 days. Residual slurred speech and loss of vision.    He is accompanied by his wife Jocelin today. Doing well since his last visit and without particular concerns today  Glucoses remains well controlled    With regards to the diabetes:    Diagnosed with Type 2 DM around   Family History of Type 2 DM: none; parents  when he was young  Known complications:  DKA/HHS: denies  RN: left hemiopsia from stroke; up to date with eye exam-seeing outside [facility]  PN: right 1st toes; now taking medication  Nephropathy: denies  Gastroparesis: denies  CAD: stents in   CVA as above    Current regimen:  Metformin  mg twice a day-unable to tolerate more-weak and tired   Trulicity 0.75 mg once a week  Jardiance 10 mg daily    Denies UTIs or yeast infections    Missed doses-denies    Other medications tried:  Toujeo 20 units daily     2 # times a day testing        BGs are at goal     Hypoglycemic event-denies and no s/s of hypoglycemia       Dietary recall: He feels that he is following diabetic diet except snacking  Eats 3 meals a day.   B: eggs, cereal, pancakes-paleo  L: as below  D: cooked at home; chicken fish steak or shrimp with vegetable and sweet potato  Snacks: sometimes snacks instead of lunch-cookies, yogurt, grapes,   Drinks: water, coffee with splenda    Exercise -No formal exercise     Education - last visit: 2020-ALLEY friedman  declines      Diabetes Management Status  Statin: Taking  ACE/ARB: Taking    Lab Results   Component Value Date    HGBA1C 6.3 (H) 11/30/2021    HGBA1C 6.2 (H) 06/01/2021    HGBA1C 6.1 (H) 05/14/2021     Screening or Prevention Patient's value Goal Complete/Controlled?   HgA1C Testing and Control   Lab Results   Component Value Date    HGBA1C 6.3 (H) 11/30/2021      Annually/Less than 8% Yes   Lipid profile : 11/30/2021 Annually Yes   LDL control Lab Results   Component Value Date    LDLCALC 62.6 (L) 11/30/2021    Annually/Less than 100 mg/dl  Yes   Nephropathy screening Lab Results   Component Value Date    LABMICR <5.0 11/30/2021     Lab Results   Component Value Date    PROTEINUA Negative 08/03/2020    Annually Yes   Blood pressure BP Readings from Last 1 Encounters:   08/12/22 130/76    Less than 140/90 Yes   Dilated retinal exam Most Recent Eye Exam Date: Not Found Annually No   Foot exam   : 11/30/2021 Annually No     With regards to hypothyroidism,   Family history of thyroid disease: unknown    Dx in 2010  Current medication:  Generic LT4 175 mcg daily     Takes thyroid medication on an empty stomach and waits 30-45 minutes before eating drinking or taking other medications  Missed doses: denies    Denied neck enlargement, hoarseness, or dysphagia.    current symptoms:      [] weight gain  [x] Fatigue  [] Constipation           [] Hair loss  [x] Brittle nails  -chronic [] Mental fog [x] Chest pain, palpations, or sob   []  Heat/cold intolerance  [] Denies complaints    Lab Results   Component Value Date    TSH 2.604 01/11/2022         Thyroid US 11/2021:  The right thyroid lobe measures 3.3 x 1.1 x 1.1 cm.  The left thyroid lobe measures 3.2 x 1.4 x 1 cm.  Isthmus measures 0.3 cm.   Thyroid parenchyma demonstrates heterogeneous echogenicity.  There is an apparent 0.8 x 0.5 x 0.7 cm solid, hypoechoic, wider than tall nodule within the right thyroid lobe that demonstrates smooth borders and no internal  "echogenic foci (TI-RADS 4).     Thyroid vascularity is within normal limits.     No lymphadenopathy.   Impression:   1. Heterogeneous thyroid parenchyma, a nonspecific finding that can be seen in the setting of chronic thyroiditis.  2. Right thyroid nodule that does not meet criteria for follow-up or FNA.          Lab Results   Component Value Date    TSH 2.604 01/11/2022     With regards to dyslipidemia,     He is atorvastatin 40 mg daily      Objective:   Physical Exam  Vitals reviewed.   Constitutional:       Appearance: He is well-developed.   Neck:      Thyroid: No thyromegaly.   Pulmonary:      Effort: Pulmonary effort is normal.   Abdominal:      Palpations: Abdomen is soft.         Protective Sensation (w/ 10 gram monofilament):8/12/22  Right: Intact  Left: Intact    Visual Inspection:  Onychomycosis -  Bilateral    Pedal Pulses:   Right: Present  Left: Present    Posterior tibialis:   Right:Present  Left: Present          Visit Vitals  /76 (BP Location: Right arm, Patient Position: Sitting, BP Method: Medium (Manual))   Pulse (!) 58   Ht 5' 8" (1.727 m)   Wt 111 kg (244 lb 13.1 oz)   SpO2 95%   BMI 37.22 kg/m²        Lab Review:   Lab Results   Component Value Date    HGBA1C 6.3 (H) 11/30/2021    HGBA1C 6.2 (H) 06/01/2021    HGBA1C 6.1 (H) 05/14/2021      Lab Results   Component Value Date    CHOL 145 11/30/2021    HDL 59 11/30/2021    LDLCALC 62.6 (L) 11/30/2021    TRIG 117 11/30/2021    CHOLHDL 40.7 11/30/2021     Lab Results   Component Value Date     12/09/2021    K 4.4 12/09/2021     12/09/2021    CO2 30 (H) 12/09/2021     (H) 12/09/2021    BUN 23 12/09/2021    CREATININE 1.0 12/09/2021    CALCIUM 10.4 12/09/2021    PROT 7.9 11/30/2021    ALBUMIN 4.0 12/09/2021    BILITOT 0.4 11/30/2021    ALKPHOS 55 11/30/2021    AST 19 11/30/2021    ALT 34 11/30/2021    ANIONGAP 9 12/09/2021    ESTGFRAFRICA >60 12/09/2021    EGFRNONAA >60 12/09/2021    TSH 2.604 01/11/2022     Vit D, " 25-Hydroxy   Date Value Ref Range Status   11/30/2021 61 30 - 96 ng/mL Final     Comment:     Vitamin D deficiency.........<10 ng/mL                              Vitamin D insufficiency......10-29 ng/mL       Vitamin D sufficiency........> or equal to 30 ng/mL  Vitamin D toxicity............>100 ng/mL       Assessment and Plan     1. Type 2 diabetes mellitus with hyperglycemia, with long-term current use of insulin  Comprehensive Metabolic Panel    Hemoglobin A1C    Microalbumin/Creatinine Ratio, Urine    Lipid Panel    TSH    US Soft Tissue Head Neck Thyroid    dulaglutide (TRULICITY) 1.5 mg/0.5 mL pen injector    empagliflozin (JARDIANCE) 10 mg tablet   2. Other specified hypothyroidism  Comprehensive Metabolic Panel    Hemoglobin A1C    Microalbumin/Creatinine Ratio, Urine    Lipid Panel    TSH    US Soft Tissue Head Neck Thyroid   3. Cerebrovascular accident (CVA), unspecified mechanism     4. Coronary artery disease, unspecified vessel or lesion type, unspecified whether angina present, unspecified whether native or transplanted heart     5. Obesity, unspecified classification, unspecified obesity type, unspecified whether serious comorbidity present     6. Essential (primary) hypertension     7. Hyperlipidemia, unspecified hyperlipidemia type         Type 2 diabetes mellitus with hyperglycemia, with long-term current use of insulin  Blood sugars on log are at goal  Will increase Trulicity for further benefit as very low risk hypoglycemia     Metformin 500 mg twice a day  Trulicity 1.5 mg once a week  Jardiance 10 mg daily    Update labs today    Discussed one pen can stay out of the refrigerator for 14 days. Please take Trulicity pen out of the refrigerator 10 minutes prior to injection.     Due for eye exam  Cont to monitor glucose twice daily        Stroke  Cont medications with CV benefit    CAD (coronary artery disease)  Cont medications with CV benefit      Obesity  Continue GLP1 as above. Continue SGLT2  as above. Discussed dietary modification    Essential (primary) hypertension  BP at goal  Cont current medications    Hyperlipemia  Update lipids today  Cont statin    Other specified hypothyroidism  Update TSH today  Cont LT4 175 mcg with dose titration pending results  Goal normal TSH, avoid iatrogenic hyperthyroidism     Follow up in about 6 months (around 2/12/2023).  Labs today    F/u Raegan 6 months

## 2022-09-23 ENCOUNTER — LAB VISIT (OUTPATIENT)
Dept: LAB | Facility: HOSPITAL | Age: 67
End: 2022-09-23
Attending: PSYCHIATRY & NEUROLOGY
Payer: MEDICARE

## 2022-09-23 DIAGNOSIS — E03.8 OTHER SPECIFIED HYPOTHYROIDISM: ICD-10-CM

## 2022-09-23 LAB — TSH SERPL DL<=0.005 MIU/L-ACNC: 3.23 UIU/ML (ref 0.4–4)

## 2022-09-23 PROCEDURE — 36415 COLL VENOUS BLD VENIPUNCTURE: CPT | Performed by: INTERNAL MEDICINE

## 2022-09-23 PROCEDURE — 84443 ASSAY THYROID STIM HORMONE: CPT | Performed by: INTERNAL MEDICINE

## 2022-11-07 ENCOUNTER — HOSPITAL ENCOUNTER (OUTPATIENT)
Dept: RADIOLOGY | Facility: HOSPITAL | Age: 67
Discharge: HOME | End: 2022-11-07
Attending: INTERNAL MEDICINE
Payer: MEDICARE

## 2022-11-07 DIAGNOSIS — E03.8 OTHER SPECIFIED HYPOTHYROIDISM: ICD-10-CM

## 2022-11-07 DIAGNOSIS — Z79.4 TYPE 2 DIABETES MELLITUS WITH HYPERGLYCEMIA, WITH LONG-TERM CURRENT USE OF INSULIN: ICD-10-CM

## 2022-11-07 DIAGNOSIS — E11.65 TYPE 2 DIABETES MELLITUS WITH HYPERGLYCEMIA, WITH LONG-TERM CURRENT USE OF INSULIN: ICD-10-CM

## 2022-11-07 PROCEDURE — 76536 US SOFT TISSUE HEAD NECK THYROID: ICD-10-PCS | Mod: 26,,, | Performed by: STUDENT IN AN ORGANIZED HEALTH CARE EDUCATION/TRAINING PROGRAM

## 2022-11-07 PROCEDURE — 76536 US EXAM OF HEAD AND NECK: CPT | Mod: TC

## 2022-11-07 PROCEDURE — 76536 US EXAM OF HEAD AND NECK: CPT | Mod: 26,,, | Performed by: STUDENT IN AN ORGANIZED HEALTH CARE EDUCATION/TRAINING PROGRAM

## 2023-01-03 DIAGNOSIS — Z79.4 TYPE 2 DIABETES MELLITUS WITH HYPERGLYCEMIA, WITH LONG-TERM CURRENT USE OF INSULIN: ICD-10-CM

## 2023-01-03 DIAGNOSIS — E11.65 TYPE 2 DIABETES MELLITUS WITH HYPERGLYCEMIA, WITH LONG-TERM CURRENT USE OF INSULIN: ICD-10-CM

## 2023-01-03 RX ORDER — METFORMIN HYDROCHLORIDE 500 MG/1
500 TABLET, EXTENDED RELEASE ORAL 2 TIMES DAILY WITH MEALS
Qty: 180 TABLET | Refills: 3 | Status: SHIPPED | OUTPATIENT
Start: 2023-01-03 | End: 2023-11-20

## 2023-02-01 NOTE — ASSESSMENT & PLAN NOTE
As above   Responded to patient via [employer]. Referral info and scheduling # given.    Florinda Miguel RN BSN  [facility]

## 2023-07-07 DIAGNOSIS — E11.65 TYPE 2 DIABETES MELLITUS WITH HYPERGLYCEMIA, WITH LONG-TERM CURRENT USE OF INSULIN: ICD-10-CM

## 2023-07-07 DIAGNOSIS — Z79.4 TYPE 2 DIABETES MELLITUS WITH HYPERGLYCEMIA, WITH LONG-TERM CURRENT USE OF INSULIN: ICD-10-CM

## 2023-07-07 RX ORDER — DULAGLUTIDE 1.5 MG/.5ML
1.5 INJECTION, SOLUTION SUBCUTANEOUS
Qty: 4 PEN | Refills: 0 | Status: SHIPPED | OUTPATIENT
Start: 2023-07-07 | End: 2023-08-03

## 2023-08-02 DIAGNOSIS — E11.65 TYPE 2 DIABETES MELLITUS WITH HYPERGLYCEMIA, WITH LONG-TERM CURRENT USE OF INSULIN: ICD-10-CM

## 2023-08-02 DIAGNOSIS — Z79.4 TYPE 2 DIABETES MELLITUS WITH HYPERGLYCEMIA, WITH LONG-TERM CURRENT USE OF INSULIN: ICD-10-CM

## 2023-08-03 RX ORDER — DULAGLUTIDE 1.5 MG/.5ML
1.5 INJECTION, SOLUTION SUBCUTANEOUS
Qty: 4 PEN | Refills: 3 | Status: SHIPPED | OUTPATIENT
Start: 2023-08-03 | End: 2023-10-19

## 2023-08-25 DIAGNOSIS — E11.65 TYPE 2 DIABETES MELLITUS WITH HYPERGLYCEMIA, WITH LONG-TERM CURRENT USE OF INSULIN: ICD-10-CM

## 2023-08-25 DIAGNOSIS — Z79.4 TYPE 2 DIABETES MELLITUS WITH HYPERGLYCEMIA, WITH LONG-TERM CURRENT USE OF INSULIN: ICD-10-CM

## 2023-08-30 DIAGNOSIS — E03.8 OTHER SPECIFIED HYPOTHYROIDISM: ICD-10-CM

## 2023-08-30 RX ORDER — LEVOTHYROXINE SODIUM 175 UG/1
TABLET ORAL
Qty: 32 TABLET | Refills: 1 | Status: SHIPPED | OUTPATIENT
Start: 2023-08-30 | End: 2023-08-31

## 2023-08-31 RX ORDER — LEVOTHYROXINE SODIUM 175 UG/1
TABLET ORAL
Qty: 96 TABLET | Refills: 0 | Status: SHIPPED | OUTPATIENT
Start: 2023-08-31

## 2023-10-16 NOTE — PROGRESS NOTES
Subjective:      Patient ID: Lorena Rodriguez is a 68 y.o. male.    Chief Complaint:  No chief complaint on file.    History of Present Illness  Lorena Rodriguez with Type 2 diabetes complicated by CAD and CVA, HTN, HLD presents today for follow up of Type 2 Diabetes and hypothyroidism. Previous patient of mine. Last visit in Aug 2022 with Dr Perez.     We first met patient in Aug 2020 whereas he was having multiple TIAs/CVA he is here to discern whether blood sugar may be contributing. He has checked glucose during some episodes and it has been in the 200-300's.  Has seen neurology and learned he had one large stroke over 5 episodes in 6 days. Residual slurred speech and loss of vision.    Doing well since his last visit and without particular concerns today  Glucoses remains well controlled    Had cataract surgery on left eye since last visit   Does report some dizziness when rising out of bed last week x2 occurrences.  Did not check BP or BG at this time.     With regards to the diabetes:    Diagnosed with Type 2 DM around   Family History of Type 2 DM: none; parents  when he was young  Known complications:  DKA/HHS: denies  RN: left hemiopsia from stroke; up to date with eye exam-seeing outside [facility]  PN: right 1st toes; now taking medication  Nephropathy: denies  Gastroparesis: denies  CAD: stents in   CVA as above    Current regimen:  Metformin  mg twice a day-unable to tolerate more-weak and tired   Trulicity 1.5 mg once a week  Jardiance 10 mg daily    Denies UTIs or yeast infections  Denies intolerable side effects from medications    Missed doses-denies    Other medications tried:  Toujeo 20 units daily     2 # times a day testing      BGs are at goal     Hypoglycemic event-denies and no s/s of hypoglycemia     Dietary recall: He feels that he is following diabetic diet except snacking  Eats 3 meals a day.   B: eggs, cereal, pancakes-paleo  L: as below  D: cooked at home; chicken fish steak or  shrimp with vegetable and sweet potato  Snacks: sometimes snacks instead of lunch-cookies, yogurt, grapes,   Drinks: water, coffee with splenda    Exercise -No formal exercise     Education - last visit: 8/31/2020-ALLEY friedman declines    Diabetes Management Status  Statin: Taking  ACE/ARB: Taking  Aug 2023 labs below      Lab Results   Component Value Date    HGBA1C 6.5 (H) 08/28/2023    HGBA1C 6.2 (H) 08/12/2022    HGBA1C 6.3 (H) 11/30/2021     Screening or Prevention Patient's value Goal Complete/Controlled?   HgA1C Testing and Control   Lab Results   Component Value Date    HGBA1C 6.5 (H) 08/28/2023      Annually/Less than 8% Yes   Lipid profile : 08/28/2023 Annually Yes   LDL control Lab Results   Component Value Date    LDLCALC 60.4 (L) 08/12/2022    Annually/Less than 100 mg/dl  Yes   Nephropathy screening Lab Results   Component Value Date    LABMICR 7.0 08/12/2022     Lab Results   Component Value Date    PROTEINUA Negative 08/03/2020    Annually Yes   Blood pressure BP Readings from Last 1 Encounters:   10/19/23 124/76    Less than 140/90 Yes   Dilated retinal exam Most Recent Eye Exam Date: Not Found Annually No   Foot exam   : 10/19/2023 Annually No     With regards to hypothyroidism,   Family history of thyroid disease: unknown    Dx in 2010  Current medication:  Generic LT4 175 mcg daily, takes at 3AM    Takes thyroid medication on an empty stomach and waits 30-45 minutes before eating drinking or taking other medications  Missed doses: denies    Denied neck enlargement, hoarseness, or dysphagia.    current symptoms:      [] weight gain  [x] Fatigue  [] Constipation           [] Hair loss  [x] Brittle nails -chronic [] Mental fog [] Chest pain, palpations, or sob   []  Heat/cold intolerance  [] Denies complaints    Lab Results   Component Value Date    TSH 3.226 09/23/2022   Aug 2023 labs below    Thyroid US 11/2021:  The right thyroid lobe measures 3.3 x 1.1 x 1.1 cm.  The left thyroid lobe  "measures 3.2 x 1.4 x 1 cm.  Isthmus measures 0.3 cm.   Thyroid parenchyma demonstrates heterogeneous echogenicity.  There is an apparent 0.8 x 0.5 x 0.7 cm solid, hypoechoic, wider than tall nodule within the right thyroid lobe that demonstrates smooth borders and no internal echogenic foci (TI-RADS 4).     Thyroid vascularity is within normal limits.     No lymphadenopathy.   Impression:   1. Heterogeneous thyroid parenchyma, a nonspecific finding that can be seen in the setting of chronic thyroiditis.  2. Right thyroid nodule that does not meet criteria for follow-up or FNA.     Lab Results   Component Value Date    TSH 3.226 09/23/2022     With regards to dyslipidemia,     He is atorvastatin 40 mg daily  Aug 2023 labs below    Objective:   Physical Exam  Vitals reviewed.   Constitutional:       Appearance: He is well-developed.   Neck:      Thyroid: No thyromegaly.   Pulmonary:      Effort: Pulmonary effort is normal.   Abdominal:      Palpations: Abdomen is soft.     injection sites are without edema or erythema. No lipo hypertropthy or atrophy  Diabetes Foot Exam:  Feet no cuts or scratches  Shoes appropriate  sensation intact to vibration and monofilament      Visit Vitals  /76   Pulse 65   Ht 5' 8" (1.727 m)   Wt 112.3 kg (247 lb 9.2 oz)   SpO2 97%   BMI 37.64 kg/m²          Lab Review:   Lab Results   Component Value Date    HGBA1C 6.5 (H) 08/28/2023    HGBA1C 6.2 (H) 08/12/2022    HGBA1C 6.3 (H) 11/30/2021      Lab Results   Component Value Date    CHOL 139 08/12/2022    HDL 60 08/12/2022    LDLCALC 60.4 (L) 08/12/2022    TRIG 93 08/12/2022    CHOLHDL 43.2 08/12/2022     Lab Results   Component Value Date     08/12/2022    K 4.3 08/12/2022     08/12/2022    CO2 31 (H) 08/12/2022     (H) 08/12/2022    BUN 23 08/12/2022    CREATININE 1.0 08/12/2022    CALCIUM 10.4 08/12/2022    PROT 7.8 08/12/2022    ALBUMIN 4.2 08/12/2022    BILITOT 0.4 08/12/2022    ALKPHOS 41 (L) 08/12/2022    AST " 17 08/12/2022    ALT 21 08/12/2022    ANIONGAP 9 08/12/2022    ESTGFRAFRICA >60 12/09/2021    EGFRNONAA >60 12/09/2021    TSH 3.226 09/23/2022     Vit D, 25-Hydroxy   Date Value Ref Range Status   11/30/2021 61 30 - 96 ng/mL Final     Comment:     Vitamin D deficiency.........<10 ng/mL                              Vitamin D insufficiency......10-29 ng/mL       Vitamin D sufficiency........> or equal to 30 ng/mL  Vitamin D toxicity............>100 ng/mL       Assessment and Plan     1. Severe obesity (BMI 35.0-39.9) with comorbidity        2. Type 2 diabetes mellitus with hyperglycemia, with long-term current use of insulin  Comprehensive Metabolic Panel    Hemoglobin A1C    Lipid Panel    TSH    Microalbumin/Creatinine Ratio, Urine    dulaglutide (TRULICITY) 3 mg/0.5 mL pen injector      3. Other specified hypothyroidism        4. Coronary artery disease, unspecified vessel or lesion type, unspecified whether angina present, unspecified whether native or transplanted heart        5. Essential (primary) hypertension        6. Hyperlipidemia, unspecified hyperlipidemia type        7. Class 2 obesity with body mass index (BMI) of 35.0 to 35.9 in adult, unspecified obesity type, unspecified whether serious comorbidity present          Type 2 diabetes mellitus with hyperglycemia, with long-term current use of insulin  Will increase Trulicity for further benefit as very low risk hypoglycemia    A1c at goal but he has been hungry lately, will increase trulicity to help with satiety. Given information on low carb snacks.   Blood sugars at goal on log. Encouraged to check Blood sugar when he feels dizzy.    Medication Changes   Continue   Metformin  mg twice a day-unable to tolerate more-weak and tired   Increase Trulicity to 3.0 mg once a week  Continue Jardiance 10 mg daily    Due for eye exam  Cont to monitor glucose twice daily        Other specified hypothyroidism  TSH above goal at check with PCP in Aug 2023    Check today  Cont LT4 175 mcg with dose titration pending results  Goal normal TSH, avoid iatrogenic hyperthyroidism    Stroke  Cont medications with CV benefit    CAD (coronary artery disease)  Cont medications with CV benefit      Essential (primary) hypertension  BP at goal  Cont current medications    Hyperlipemia  LDL at goal   Cont statin    Obesity  Continue GLP1 as above. Continue SGLT2 as above. Discussed dietary modification    Severe obesity (BMI 35.0-39.9) with comorbidity  As above     Follow up in about 4 months (around 2/19/2024).

## 2023-10-17 PROBLEM — E66.01 SEVERE OBESITY (BMI 35.0-39.9) WITH COMORBIDITY: Status: ACTIVE | Noted: 2023-10-17

## 2023-10-17 NOTE — ASSESSMENT & PLAN NOTE
TSH above goal at check with PCP in Aug 2023   Check today  Cont LT4 175 mcg with dose titration pending results  Goal normal TSH, avoid iatrogenic hyperthyroidism

## 2023-10-17 NOTE — ASSESSMENT & PLAN NOTE
Will increase Trulicity for further benefit as very low risk hypoglycemia    A1c at goal but he has been hungry lately, will increase trulicity to help with satiety. Given information on low carb snacks.   Blood sugars at goal on log. Encouraged to check Blood sugar when he feels dizzy.    Medication Changes   Continue   Metformin  mg twice a day-unable to tolerate more-weak and tired   Increase Trulicity to 3.0 mg once a week  Continue Jardiance 10 mg daily    Due for eye exam  Cont to monitor glucose twice daily

## 2023-10-19 ENCOUNTER — LAB VISIT (OUTPATIENT)
Dept: LAB | Facility: HOSPITAL | Age: 68
End: 2023-10-19
Payer: MEDICARE

## 2023-10-19 ENCOUNTER — OFFICE VISIT (OUTPATIENT)
Dept: ENDOCRINOLOGY | Facility: CLINIC | Age: 68
End: 2023-10-19
Payer: MEDICARE

## 2023-10-19 VITALS
SYSTOLIC BLOOD PRESSURE: 124 MMHG | OXYGEN SATURATION: 97 % | DIASTOLIC BLOOD PRESSURE: 76 MMHG | HEIGHT: 68 IN | WEIGHT: 247.56 LBS | BODY MASS INDEX: 37.52 KG/M2 | HEART RATE: 65 BPM

## 2023-10-19 DIAGNOSIS — E11.65 TYPE 2 DIABETES MELLITUS WITH HYPERGLYCEMIA, WITH LONG-TERM CURRENT USE OF INSULIN: Chronic | ICD-10-CM

## 2023-10-19 DIAGNOSIS — E66.01 SEVERE OBESITY (BMI 35.0-39.9) WITH COMORBIDITY: Primary | ICD-10-CM

## 2023-10-19 DIAGNOSIS — Z79.4 TYPE 2 DIABETES MELLITUS WITH HYPERGLYCEMIA, WITH LONG-TERM CURRENT USE OF INSULIN: Chronic | ICD-10-CM

## 2023-10-19 DIAGNOSIS — I10 ESSENTIAL (PRIMARY) HYPERTENSION: Chronic | ICD-10-CM

## 2023-10-19 DIAGNOSIS — E78.5 HYPERLIPIDEMIA, UNSPECIFIED HYPERLIPIDEMIA TYPE: Chronic | ICD-10-CM

## 2023-10-19 DIAGNOSIS — I25.10 CORONARY ARTERY DISEASE, UNSPECIFIED VESSEL OR LESION TYPE, UNSPECIFIED WHETHER ANGINA PRESENT, UNSPECIFIED WHETHER NATIVE OR TRANSPLANTED HEART: ICD-10-CM

## 2023-10-19 DIAGNOSIS — E66.9 CLASS 2 OBESITY WITH BODY MASS INDEX (BMI) OF 35.0 TO 35.9 IN ADULT, UNSPECIFIED OBESITY TYPE, UNSPECIFIED WHETHER SERIOUS COMORBIDITY PRESENT: ICD-10-CM

## 2023-10-19 DIAGNOSIS — E03.8 OTHER SPECIFIED HYPOTHYROIDISM: ICD-10-CM

## 2023-10-19 LAB
T4 FREE SERPL-MCNC: 1.13 NG/DL (ref 0.71–1.51)
TSH SERPL DL<=0.005 MIU/L-ACNC: 4.04 UIU/ML (ref 0.4–4)

## 2023-10-19 PROCEDURE — 99999 PR PBB SHADOW E&M-EST. PATIENT-LVL IV: CPT | Mod: PBBFAC,,, | Performed by: NURSE PRACTITIONER

## 2023-10-19 PROCEDURE — 84443 ASSAY THYROID STIM HORMONE: CPT | Performed by: NURSE PRACTITIONER

## 2023-10-19 PROCEDURE — 99214 OFFICE O/P EST MOD 30 MIN: CPT | Mod: PBBFAC | Performed by: NURSE PRACTITIONER

## 2023-10-19 PROCEDURE — 36415 COLL VENOUS BLD VENIPUNCTURE: CPT | Performed by: NURSE PRACTITIONER

## 2023-10-19 PROCEDURE — 99214 PR OFFICE/OUTPT VISIT, EST, LEVL IV, 30-39 MIN: ICD-10-PCS | Mod: S$PBB,,, | Performed by: NURSE PRACTITIONER

## 2023-10-19 PROCEDURE — 99999 PR PBB SHADOW E&M-EST. PATIENT-LVL IV: ICD-10-PCS | Mod: PBBFAC,,, | Performed by: NURSE PRACTITIONER

## 2023-10-19 PROCEDURE — 84439 ASSAY OF FREE THYROXINE: CPT | Performed by: NURSE PRACTITIONER

## 2023-10-19 PROCEDURE — 99214 OFFICE O/P EST MOD 30 MIN: CPT | Mod: S$PBB,,, | Performed by: NURSE PRACTITIONER

## 2023-10-19 RX ORDER — UBIDECARENONE 75 MG
1 CAPSULE ORAL DAILY
COMMUNITY

## 2023-10-19 RX ORDER — OMEPRAZOLE 40 MG/1
40 CAPSULE, DELAYED RELEASE ORAL
COMMUNITY
Start: 2023-09-30

## 2023-10-19 RX ORDER — GABAPENTIN 300 MG/1
300 CAPSULE ORAL NIGHTLY
COMMUNITY
Start: 2023-07-13

## 2023-10-19 RX ORDER — DULAGLUTIDE 3 MG/.5ML
3 INJECTION, SOLUTION SUBCUTANEOUS
Qty: 4 PEN | Refills: 0 | Status: SHIPPED | OUTPATIENT
Start: 2023-10-19 | End: 2023-12-22

## 2023-10-19 NOTE — PATIENT INSTRUCTIONS
Diabetes     A1c at goal but he has been hungry lately, will increase trulicity to help with satiety. Given information on low carb snacks.   Blood sugars at goal on log. Encouraged to check Blood sugar when he feels dizzy.    Medication Changes   Continue   Metformin  mg twice a day-unable to tolerate more-weak and tired   Increase Trulicity to 3.0 mg once a week  Continue Jardiance 10 mg daily    Snacks can be an important part of a balanced, healthy meal plan. They allow you to eat more frequently, feeling full and satisfied throughout the day. Also, they allow you to spread carbohydrates evenly, which may stabilize blood sugars.  Plus, snacks are enjoyable!     The amount of carbohydrate needed at snacks varies. Generally, about 15-30 grams of carbohydrate per snack is recommended.  Below you will find some tasty treats.       0-5 gm carb  Crystal Light  Vitamin Water Zero  Herbal tea, unsweetened  2 tsp peanut butter on celery  1./2 cup sugar-free jell-o  1 sugar-free popsicle  ¼ cup blueberries  8oz Blue Shannan unsweetened almond milk  5 baby carrots & celery sticks, cucumbers, bell peppers dipped in ¼ cup salsa, 2Tbsp light ranch dressing or 2Tbsp plain Greek yogurt  10 Goldfish crackers  ½ oz low-fat cheese or string cheese  1 closed handful of nuts, unsalted  1 Tbsp of sunflower seeds, unsalted  1 cup Smart Pop popcorn  1 whole grain brown rice cake        15 gm carb  1 small piece of fruit or ½ banana or 1/2 cup lite canned fruit  3 compa cracker squares  3 cups Smart Pop popcorn, top spray butter, [place] lite salt or cinnamon and Truvia  5 Vanilla Wafers  ½ cup low fat, no added sugar ice cream or frozen yogurt (Blue bell, Blue Bunny, Weight Watchers, Skinny Cow)  ½ turkey, ham, or chicken sandwich  ½ c fruit with ½ c Cottage cheese  4-6 unsalted wheat crackers with 1 oz low fat cheese or 1 tbsp peanut butter   30-45 goldfish crackers (depending on flavor)   7-8 Sabianism mini brown rice cakes  (caramel, apple cinnamon, chocolate)   12 Congregation mini brown rice cakes (cheddar, bbq, ranch)   1/3 cup hummus dip with raw veg  1/2 whole wheat carina, 1Tbsp hummus  Mini Pizza (1/2 whole wheat English muffin, low-fat  cheese, tomato sauce)  100 calorie snack pack (Oreo, Chips Ahoy, Ritz Mix, Baked Cheetos)  4-6 oz. light or Greek Style yogurt (Chobani, Yoplait, [place], [place])  ½ cup sugar-free pudding    6 in. wheat tortilla or carina oven toasted chips (topped with spray butter flavoring, cinnamon, Truvia OR spray butter, garlic powder, chili powder)   18 BBQ Popchips (available at [employer], [employer], [employer])

## 2023-11-18 DIAGNOSIS — Z79.4 TYPE 2 DIABETES MELLITUS WITH HYPERGLYCEMIA, WITH LONG-TERM CURRENT USE OF INSULIN: ICD-10-CM

## 2023-11-18 DIAGNOSIS — E11.65 TYPE 2 DIABETES MELLITUS WITH HYPERGLYCEMIA, WITH LONG-TERM CURRENT USE OF INSULIN: ICD-10-CM

## 2023-11-20 RX ORDER — METFORMIN HYDROCHLORIDE 500 MG/1
500 TABLET, EXTENDED RELEASE ORAL 2 TIMES DAILY WITH MEALS
Qty: 180 TABLET | Refills: 3 | Status: SHIPPED | OUTPATIENT
Start: 2023-11-20

## 2023-12-21 DIAGNOSIS — E11.65 TYPE 2 DIABETES MELLITUS WITH HYPERGLYCEMIA, WITH LONG-TERM CURRENT USE OF INSULIN: Chronic | ICD-10-CM

## 2023-12-21 DIAGNOSIS — Z79.4 TYPE 2 DIABETES MELLITUS WITH HYPERGLYCEMIA, WITH LONG-TERM CURRENT USE OF INSULIN: Chronic | ICD-10-CM

## 2023-12-22 RX ORDER — DULAGLUTIDE 3 MG/.5ML
INJECTION, SOLUTION SUBCUTANEOUS
Qty: 4 PEN | Refills: 3 | Status: SHIPPED | OUTPATIENT
Start: 2023-12-22

## 2024-01-31 DIAGNOSIS — Z79.4 TYPE 2 DIABETES MELLITUS WITH HYPERGLYCEMIA, WITH LONG-TERM CURRENT USE OF INSULIN: ICD-10-CM

## 2024-01-31 DIAGNOSIS — E11.65 TYPE 2 DIABETES MELLITUS WITH HYPERGLYCEMIA, WITH LONG-TERM CURRENT USE OF INSULIN: ICD-10-CM

## 2024-01-31 RX ORDER — EMPAGLIFLOZIN 10 MG/1
10 TABLET, FILM COATED ORAL
Qty: 30 TABLET | Refills: 2 | Status: SHIPPED | OUTPATIENT
Start: 2024-01-31 | End: 2024-04-30

## 2024-02-23 NOTE — TELEPHONE ENCOUNTER
----- Message from Lalo Causey sent at 9/10/2020 10:57 AM CDT -----  Pt asking for a callback regarding to test results please contact pt         Contact info [phone]    
No

## 2024-04-20 DIAGNOSIS — E03.8 OTHER SPECIFIED HYPOTHYROIDISM: ICD-10-CM

## 2024-04-22 RX ORDER — LEVOTHYROXINE SODIUM 175 UG/1
TABLET ORAL
Qty: 96 TABLET | Refills: 1 | Status: SHIPPED | OUTPATIENT
Start: 2024-04-22

## 2024-04-30 DIAGNOSIS — Z79.4 TYPE 2 DIABETES MELLITUS WITH HYPERGLYCEMIA, WITH LONG-TERM CURRENT USE OF INSULIN: ICD-10-CM

## 2024-04-30 DIAGNOSIS — E11.65 TYPE 2 DIABETES MELLITUS WITH HYPERGLYCEMIA, WITH LONG-TERM CURRENT USE OF INSULIN: ICD-10-CM

## 2024-04-30 RX ORDER — EMPAGLIFLOZIN 10 MG/1
10 TABLET, FILM COATED ORAL
Qty: 30 TABLET | Refills: 3 | Status: SHIPPED | OUTPATIENT
Start: 2024-04-30

## 2024-07-30 DIAGNOSIS — E11.65 TYPE 2 DIABETES MELLITUS WITH HYPERGLYCEMIA, WITH LONG-TERM CURRENT USE OF INSULIN: ICD-10-CM

## 2024-07-30 DIAGNOSIS — Z79.4 TYPE 2 DIABETES MELLITUS WITH HYPERGLYCEMIA, WITH LONG-TERM CURRENT USE OF INSULIN: ICD-10-CM

## 2024-07-30 RX ORDER — METFORMIN HYDROCHLORIDE 500 MG/1
500 TABLET, EXTENDED RELEASE ORAL 2 TIMES DAILY WITH MEALS
Qty: 180 TABLET | Refills: 3 | Status: SHIPPED | OUTPATIENT
Start: 2024-07-30

## (undated) DEVICE — GAUZE SPONGE 4X4 12PLY

## (undated) DEVICE — ALCOHOL 70% ISOP W/GREEN 16OZ

## (undated) DEVICE — PAD SHOULDER POLAR CARE XL

## (undated) DEVICE — POSITIONER IV ARMBOARD FOAM

## (undated) DEVICE — DRAPE STERI U-SHAPED 47X51IN

## (undated) DEVICE — DRAPE INCISE IOBAN 2 23X17IN

## (undated) DEVICE — SET EXTENSION 30 IN W/LL ROLLE

## (undated) DEVICE — NDL ARTHSCP MF SCORPION

## (undated) DEVICE — TUBE SET INFLOW/OUTFLOW

## (undated) DEVICE — WAND COBLATION TURBOVAC 90

## (undated) DEVICE — TAPE MEDIPORE 3 X 10YD

## (undated) DEVICE — GLOVE BIOGEL SKINSENSE PI 8.0

## (undated) DEVICE — SUT PROLENE 3-0 FS-1 MONO18

## (undated) DEVICE — NDL SUREFIRE SCORPION RC

## (undated) DEVICE — DRAPE STERI INSTRUMENT 1018

## (undated) DEVICE — SUPPORT SLING SHOT II MEDIUM

## (undated) DEVICE — PAD ABD 8X10 STERILE

## (undated) DEVICE — GLOVE BIOGEL SKINSENSE PI 7.0

## (undated) DEVICE — SLING SHOT II LARGE

## (undated) DEVICE — SOL IRR NACL .9% 3000ML

## (undated) DEVICE — TAPE SURG MEDIPORE 6X72IN

## (undated) DEVICE — Device

## (undated) DEVICE — DRESSING XEROFORM FOIL PK 1X8

## (undated) DEVICE — CANNULA PASSPORT 8 MM X 3CM

## (undated) DEVICE — APPLICATOR CHLORAPREP ORN 26ML

## (undated) DEVICE — GLOVE BIOGEL SKINSENSE PI 7.5

## (undated) DEVICE — UNDERGLOVES BIOGEL PI SZ 7 LF

## (undated) DEVICE — KIT TRACTION SHLDR ST DISP LF

## (undated) DEVICE — SOL 9P NACL IRR PIC IL